# Patient Record
Sex: MALE | Race: WHITE | NOT HISPANIC OR LATINO | Employment: OTHER | URBAN - METROPOLITAN AREA
[De-identification: names, ages, dates, MRNs, and addresses within clinical notes are randomized per-mention and may not be internally consistent; named-entity substitution may affect disease eponyms.]

---

## 2022-03-11 ENCOUNTER — OFFICE VISIT (OUTPATIENT)
Dept: URGENT CARE | Facility: CLINIC | Age: 67
End: 2022-03-11
Payer: COMMERCIAL

## 2022-03-11 ENCOUNTER — APPOINTMENT (OUTPATIENT)
Dept: RADIOLOGY | Facility: CLINIC | Age: 67
End: 2022-03-11
Payer: COMMERCIAL

## 2022-03-11 VITALS
RESPIRATION RATE: 18 BRPM | HEIGHT: 68 IN | WEIGHT: 160 LBS | HEART RATE: 93 BPM | TEMPERATURE: 98.4 F | OXYGEN SATURATION: 98 % | SYSTOLIC BLOOD PRESSURE: 152 MMHG | DIASTOLIC BLOOD PRESSURE: 92 MMHG | BODY MASS INDEX: 24.25 KG/M2

## 2022-03-11 DIAGNOSIS — R07.9 CHEST PAIN, UNSPECIFIED TYPE: ICD-10-CM

## 2022-03-11 DIAGNOSIS — R07.9 CHEST PAIN, UNSPECIFIED TYPE: Primary | ICD-10-CM

## 2022-03-11 PROCEDURE — 71046 X-RAY EXAM CHEST 2 VIEWS: CPT

## 2022-03-11 PROCEDURE — G0382 LEV 3 HOSP TYPE B ED VISIT: HCPCS | Performed by: PHYSICIAN ASSISTANT

## 2022-03-11 PROCEDURE — 93005 ELECTROCARDIOGRAM TRACING: CPT | Performed by: PHYSICIAN ASSISTANT

## 2022-03-11 RX ORDER — AZITHROMYCIN 250 MG/1
TABLET, FILM COATED ORAL
Qty: 6 TABLET | Refills: 0 | Status: SHIPPED | OUTPATIENT
Start: 2022-03-11 | End: 2022-03-15

## 2022-03-11 NOTE — PROGRESS NOTES
[address] [facility]        NAME: Miller Lefort is a 74 y o  male  : 1955    MRN: [id number]  DATE: 2022  TIME: 12:55 PM    Assessment and Plan   Chest pain, unspecified type [R07 9]  1  Chest pain, unspecified type  XR chest pa & lateral    azithromycin (ZITHROMAX) 250 mg tablet    Ambulatory Referral to [facility]         Patient Instructions     Patient Instructions     Possible left lower lobe pneumonia vs atelectasis  B/l small lung nodules vs  Nipples seen  Awaiting radiology review  EKG WNL  Advised rest and hydration  Start azithromycin  Avoid strenuous activity  Refer to PCP  Go to ER or call  if symptoms worsen  Chest Pain   WHAT YOU NEED TO KNOW:   Chest pain can be caused by a range of conditions, from not serious to life-threatening  Chest pain can be a symptom of a digestive problem, such as acid reflux or a stomach ulcer  An anxiety attack or a strong emotion, such as anger, can also cause chest pain  Infection, inflammation, or a fracture in the bones or cartilage in your chest can cause pain or discomfort  Sometimes chest pain or pressure is caused by poor blood flow to your heart (angina)  Chest pain may also be caused by life-threatening conditions such as a heart attack or blood clot in your lungs  DISCHARGE INSTRUCTIONS:   Call your local emergency number (911 in the [address] [place] [address],[address]) or have someone call if:   · You have any of the following signs of a heart attack:      ? Squeezing, pressure, or pain in your chest    ? You may  also have any of the following:     § Discomfort or pain in your back, neck, jaw, stomach, or arm    § Shortness of breath    § Nausea or vomiting    § Lightheadedness or a sudden cold sweat      Return to the emergency department if:   · You have chest discomfort that gets worse, even with medicine  · You cough or vomit blood  · Your bowel movements are black or bloody  · You cannot stop vomiting, or it hurts to swallow      Call your doctor if:   · You have questions or concerns about your condition or care  Medicines:   · Medicines  may be given to treat the cause of your chest pain  Examples include pain medicine, anxiety medicine, or medicines to increase blood flow to your heart  · Do not take certain medicines without asking your healthcare provider first   These include NSAIDs, herbal or vitamin supplements, or hormones (estrogen or progestin)  · Take your medicine as directed  Contact your healthcare provider if you think your medicine is not helping or if you have side effects  Tell him or her if you are allergic to any medicine  Keep a list of the medicines, vitamins, and herbs you take  Include the amounts, and when and why you take them  Bring the list or the pill bottles to follow-up visits  Carry your medicine list with you in case of an emergency  Healthy living tips: The following are general healthy guidelines  If the cause of your chest pain is known, your healthcare provider will give you specific guidelines to follow  · Do not smoke  Nicotine and other chemicals in cigarettes and cigars can cause lung and heart damage  Ask your healthcare provider for information if you currently smoke and need help to quit  E-cigarettes or smokeless tobacco still contain nicotine  Talk to your healthcare provider before you use these products  · Choose a variety of healthy foods as often as possible  Include fresh, frozen, or canned fruits and vegetables  Also include low-fat dairy products, fish, chicken (without skin), and lean meats  Your healthcare provider or a dietitian can help you create meal plans  You may need to avoid certain foods or drinks if your pain is caused by a digestion problem  · Lower your sodium (salt) intake  Limit foods that are high in sodium, such as canned foods, salty snacks, and cold cuts  If you add salt when you cook food, do not add more at the table   Choose low-sodium canned foods as much as possible  · Drink plenty of water every day  Water helps your body to control your temperature and blood pressure  Ask your healthcare provider how much water you should drink every day  · Ask about activity  Your healthcare provider will tell you which activities to limit or avoid  Ask when you can drive, return to work, and have sex  Ask about the best exercise plan for you  · Maintain a healthy weight  Ask your healthcare provider what a healthy weight is for you  Ask him or her to help you create a safe weight loss plan if you are overweight  · Ask about vaccines you may need  Get the influenza (flu) vaccine every year as soon as recommended, usually in September or October  You may also need a pneumococcal vaccine to prevent pneumonia  The vaccine is usually given every 5 years, starting at age 72  Your healthcare provider can tell you if should get other vaccines, and when to get them  Follow up with your healthcare provider within 72 hours, or as directed: You may need to return for more tests to find the cause of your chest pain  You may be referred to a specialist, such as a cardiologist or gastroenterologist  Write down your questions so you remember to ask them during your visits  © Copyright "[employer] 2022 Information is for End User's use only and may not be sold, redistributed or otherwise used for commercial purposes  All illustrations and images included in CareNotes® are the copyrighted property of [employer]  or Lisbet Churchill  The above information is an  only  It is not intended as medical advice for individual conditions or treatments  Talk to your doctor, nurse or pharmacist before following any medical regimen to see if it is safe and effective for you  **Portions of the record may have been created with voice recognition software    Occasional wrong word or "sound a like" substitutions may have occurred due to the inherent limitations of voice recognition software  Read the chart carefully and recognize, using context, where substitutions have occurred  **     Chief Complaint     Chief Complaint   Patient presents with    Chest Pain     tightness in chest that has been intermittent with activity  feels like bronchitis  History of Present Illness     65yo male presents to clinic with complaints of chest pains x a few months  States he was shoveling snow when he began having a burning sensation in his chest and shortness of breath  He has history of heartburn controlled on prevacid  He denies known history of cardiac dz  He smoked cigarettes for 40 years and now he vapes  Denies fever, chills, lightheadedness, dizziness, n/v/d, abdominal pain  He moved to the area 3 years ago and has yet to establish a pcp  Review of Systems     Review of Systems   Constitutional: Negative for appetite change, chills and fever  HENT: Negative for congestion, ear discharge, ear pain, facial swelling, mouth sores, postnasal drip, sinus pressure, sinus pain and sore throat  Eyes: Negative for discharge and redness  Respiratory: Positive for cough and chest tightness  Negative for shortness of breath and wheezing  Cardiovascular: Positive for chest pain  Negative for palpitations and leg swelling  Gastrointestinal: Negative for diarrhea, nausea and vomiting  Musculoskeletal: Negative for myalgias  Skin: Negative for rash  Neurological: Negative for dizziness and headaches  Psychiatric/Behavioral: The patient is not nervous/anxious            Current Medications     Current Outpatient Medications:     chlordiazePOXIDE-Clidinium (LIBRAX PO), Take by mouth, Disp: , Rfl:     Lansoprazole (PREVACID PO), Take by mouth, Disp: , Rfl:     azithromycin (ZITHROMAX) 250 mg tablet, Take 2 tablets today then 1 tablet daily x 4 days, Disp: 6 tablet, Rfl: 0    Current Allergies     Allergies as of 03/11/2022    (No Known Allergies) The following portions of the patient's history were reviewed and updated as appropriate: allergies, current medications, past family history, past medical history, past social history, past surgical history and problem list      Past Medical History:   Diagnosis Date    GERD (gastroesophageal reflux disease)        Past Surgical History:   Procedure Laterality Date    HERNIA REPAIR         No family history on file  Medications have been verified  Objective     /92 (BP Location: Left arm, Patient Position: Sitting)   Pulse 93   Temp 98 4 °F (36 9 °C) (Temporal)   Resp 18   Ht 5' 8" (1 727 m)   Wt 72 6 kg (160 lb)   SpO2 98%   BMI 24 33 kg/m²        Physical Exam     Physical Exam  Vitals and nursing note reviewed  Constitutional:       General: He is not in acute distress  Appearance: He is well-developed  He is not ill-appearing or diaphoretic  HENT:      Head: Normocephalic and atraumatic  Cardiovascular:      Rate and Rhythm: Normal rate and regular rhythm  Pulmonary:      Breath sounds: Examination of the left-lower field reveals rhonchi  Rhonchi present  Wheezes: expiratory  Skin:     General: Skin is warm and dry  Findings: No rash  Neurological:      Mental Status: He is alert

## 2022-03-11 NOTE — PATIENT INSTRUCTIONS
Possible left lower lobe pneumonia vs atelectasis  B/l small lung nodules vs  Nipples seen  Awaiting radiology review  EKG WNL  Advised rest and hydration  Start azithromycin  Avoid strenuous activity  Refer to PCP  Go to ER or call 9-1-1 if symptoms worsen  Chest Pain   WHAT YOU NEED TO KNOW:   Chest pain can be caused by a range of conditions, from not serious to life-threatening  Chest pain can be a symptom of a digestive problem, such as acid reflux or a stomach ulcer  An anxiety attack or a strong emotion, such as anger, can also cause chest pain  Infection, inflammation, or a fracture in the bones or cartilage in your chest can cause pain or discomfort  Sometimes chest pain or pressure is caused by poor blood flow to your heart (angina)  Chest pain may also be caused by life-threatening conditions such as a heart attack or blood clot in your lungs  DISCHARGE INSTRUCTIONS:   Call your local emergency number (911 in the [address] [facility],[address]) or have someone call if:   · You have any of the following signs of a heart attack:      ? Squeezing, pressure, or pain in your chest    ? You may  also have any of the following:     § Discomfort or pain in your back, neck, jaw, stomach, or arm    § Shortness of breath    § Nausea or vomiting    § Lightheadedness or a sudden cold sweat      Return to the emergency department if:   · You have chest discomfort that gets worse, even with medicine  · You cough or vomit blood  · Your bowel movements are black or bloody  · You cannot stop vomiting, or it hurts to swallow  Call your doctor if:   · You have questions or concerns about your condition or care  Medicines:   · Medicines  may be given to treat the cause of your chest pain  Examples include pain medicine, anxiety medicine, or medicines to increase blood flow to your heart      · Do not take certain medicines without asking your healthcare provider first   These include NSAIDs, herbal or vitamin supplements, or hormones (estrogen or progestin)  · Take your medicine as directed  Contact your healthcare provider if you think your medicine is not helping or if you have side effects  Tell him or her if you are allergic to any medicine  Keep a list of the medicines, vitamins, and herbs you take  Include the amounts, and when and why you take them  Bring the list or the pill bottles to follow-up visits  Carry your medicine list with you in case of an emergency  Healthy living tips: The following are general healthy guidelines  If the cause of your chest pain is known, your healthcare provider will give you specific guidelines to follow  · Do not smoke  Nicotine and other chemicals in cigarettes and cigars can cause lung and heart damage  Ask your healthcare provider for information if you currently smoke and need help to quit  E-cigarettes or smokeless tobacco still contain nicotine  Talk to your healthcare provider before you use these products  · Choose a variety of healthy foods as often as possible  Include fresh, frozen, or canned fruits and vegetables  Also include low-fat dairy products, fish, chicken (without skin), and lean meats  Your healthcare provider or a dietitian can help you create meal plans  You may need to avoid certain foods or drinks if your pain is caused by a digestion problem  · Lower your sodium (salt) intake  Limit foods that are high in sodium, such as canned foods, salty snacks, and cold cuts  If you add salt when you cook food, do not add more at the table  Choose low-sodium canned foods as much as possible  · Drink plenty of water every day  Water helps your body to control your temperature and blood pressure  Ask your healthcare provider how much water you should drink every day  · Ask about activity  Your healthcare provider will tell you which activities to limit or avoid  Ask when you can drive, return to work, and have sex   Ask about the best exercise plan for you     · Maintain a healthy weight  Ask your healthcare provider what a healthy weight is for you  Ask him or her to help you create a safe weight loss plan if you are overweight  · Ask about vaccines you may need  Get the influenza (flu) vaccine every year as soon as recommended, usually in September or October  You may also need a pneumococcal vaccine to prevent pneumonia  The vaccine is usually given every 5 years, starting at age 72  Your healthcare provider can tell you if should get other vaccines, and when to get them  Follow up with your healthcare provider within 72 hours, or as directed: You may need to return for more tests to find the cause of your chest pain  You may be referred to a specialist, such as a cardiologist or gastroenterologist  Write down your questions so you remember to ask them during your visits  © Copyright [employer] 2022 Information is for End User's use only and may not be sold, redistributed or otherwise used for commercial purposes  All illustrations and images included in CareNotes® are the copyrighted property of [employer] , Inc  or [facility] Avelino Reagan   The above information is an  only  It is not intended as medical advice for individual conditions or treatments  Talk to your doctor, nurse or pharmacist before following any medical regimen to see if it is safe and effective for you

## 2022-03-13 LAB
ATRIAL RATE: 80 BPM
P AXIS: 75 DEGREES
PR INTERVAL: 164 MS
QRS AXIS: 73 DEGREES
QRSD INTERVAL: 72 MS
QT INTERVAL: 348 MS
QTC INTERVAL: 401 MS
T WAVE AXIS: 78 DEGREES
VENTRICULAR RATE: 80 BPM

## 2022-03-13 PROCEDURE — 93010 ELECTROCARDIOGRAM REPORT: CPT | Performed by: INTERNAL MEDICINE

## 2022-03-22 ENCOUNTER — OFFICE VISIT (OUTPATIENT)
Dept: FAMILY MEDICINE CLINIC | Facility: CLINIC | Age: 67
End: 2022-03-22
Payer: COMMERCIAL

## 2022-03-22 VITALS
HEART RATE: 88 BPM | DIASTOLIC BLOOD PRESSURE: 90 MMHG | TEMPERATURE: 97.9 F | OXYGEN SATURATION: 96 % | BODY MASS INDEX: 24.86 KG/M2 | HEIGHT: 68 IN | SYSTOLIC BLOOD PRESSURE: 140 MMHG | WEIGHT: 164 LBS

## 2022-03-22 DIAGNOSIS — R07.89 OTHER CHEST PAIN: Primary | ICD-10-CM

## 2022-03-22 DIAGNOSIS — R06.00 DOE (DYSPNEA ON EXERTION): ICD-10-CM

## 2022-03-22 DIAGNOSIS — F17.290 OTHER TOBACCO PRODUCT NICOTINE DEPENDENCE, UNCOMPLICATED: ICD-10-CM

## 2022-03-22 DIAGNOSIS — Z13.220 SCREENING FOR LIPID DISORDERS: ICD-10-CM

## 2022-03-22 DIAGNOSIS — I10 PRIMARY HYPERTENSION: ICD-10-CM

## 2022-03-22 DIAGNOSIS — Z12.11 SCREEN FOR COLON CANCER: ICD-10-CM

## 2022-03-22 PROBLEM — R06.09 DOE (DYSPNEA ON EXERTION): Status: ACTIVE | Noted: 2022-03-22

## 2022-03-22 PROBLEM — F17.200 NICOTINE DEPENDENCE: Status: ACTIVE | Noted: 2022-03-22

## 2022-03-22 PROCEDURE — 3008F BODY MASS INDEX DOCD: CPT | Performed by: PHYSICIAN ASSISTANT

## 2022-03-22 PROCEDURE — 3725F SCREEN DEPRESSION PERFORMED: CPT | Performed by: PHYSICIAN ASSISTANT

## 2022-03-22 PROCEDURE — 1036F TOBACCO NON-USER: CPT | Performed by: PHYSICIAN ASSISTANT

## 2022-03-22 PROCEDURE — 99204 OFFICE O/P NEW MOD 45 MIN: CPT | Performed by: PHYSICIAN ASSISTANT

## 2022-03-22 PROCEDURE — 1160F RVW MEDS BY RX/DR IN RCRD: CPT | Performed by: PHYSICIAN ASSISTANT

## 2022-03-22 RX ORDER — METOPROLOL SUCCINATE 25 MG/1
25 TABLET, EXTENDED RELEASE ORAL DAILY
Qty: 30 TABLET | Refills: 0 | Status: SHIPPED | OUTPATIENT
Start: 2022-03-22 | End: 2022-04-15 | Stop reason: SDUPTHER

## 2022-03-22 RX ORDER — ASPIRIN 81 MG/1
81 TABLET ORAL DAILY
Qty: 30 TABLET | Refills: 2 | Status: SHIPPED | OUTPATIENT
Start: 2022-03-22 | End: 2022-04-29 | Stop reason: SDUPTHER

## 2022-03-22 NOTE — PROGRESS NOTES
Assessment/Plan:       Problem List Items Addressed This Visit        Cardiovascular and Mediastinum    Primary hypertension    Relevant Medications    metoprolol succinate (TOPROL-XL) 25 mg 24 hr tablet    Other Relevant Orders    Ambulatory Referral to Cardiology    Echo complete w/ contrast if indicated    Stress test only, exercise    Comprehensive metabolic panel    CBC and differential       Other    Other chest pain - Primary    Relevant Medications    aspirin (ECOTRIN LOW STRENGTH) 81 mg EC tablet    Other Relevant Orders    Ambulatory Referral to Cardiology    Echo complete w/ contrast if indicated    Stress test only, exercise    LOPEZ (dyspnea on exertion)    Relevant Medications    aspirin (ECOTRIN LOW STRENGTH) 81 mg EC tablet    Other Relevant Orders    Ambulatory Referral to Cardiology    Echo complete w/ contrast if indicated    Stress test only, exercise    CBC and differential    Nicotine dependence    Relevant Orders    Ambulatory Referral to Cardiology    Echo complete w/ contrast if indicated    Stress test only, exercise      Other Visit Diagnoses     Screening for lipid disorders        Relevant Orders    Lipid Panel with Direct LDL reflex    Screen for colon cancer        Relevant Orders    Cologuard        high concern for cardiac etiology of his CP  +risk factors, worse with exertion, better with rest, associated with SOB  +hx of smoking  +FH  Start daily ASA, will begin toprol  Elevated BP on 2 past occasions  Monitor  Check lipids  Statin if lipids elevated  Refer to cardiology  Ordered stress testing and echo  Strict ER precautions for new/worsening sx  Will follow with all results  Smoking cessation  1 month follow up, earlier prn  Subjective:      Patient ID: Zurdo Ramos is a 74 y o  male  Pt [facility] care  He has no known PMH but shares for the past 1-2 months he has had exertional CP across his chest  Better with rest  Associated with SOB   He felt a lot of chest tightness/burning when shoveling snow  Now occurring with very little exertion  No N/V, dizziness, syncope, LE edema  No known cardiac disease  He believes he had a nuclear stress test 6 years ago which he shares "was not stellar" but was told he didn't need to follow up  He has had GERD but this feels different  He was [facility] in the [facility] on [facility], EKG NSR  Was treated for a pneumonia  No fevers or coughing  +FH of MI in father  40+ pack year smoker  Denies abuse or misuse of drugs or alcohol  No known allergies  No daily medications at this time  The following portions of the patient's history were reviewed and updated as appropriate:   He  has a past medical history of GERD (gastroesophageal reflux disease)  He   Patient Active Problem List    Diagnosis Date Noted    Other chest pain 03/22/2022    Primary hypertension 03/22/2022    LOPEZ (dyspnea on exertion) 03/22/2022    Nicotine dependence 03/22/2022     He  has a past surgical history that includes Hernia repair  His family history includes Cancer in his mother and sister; Heart attack in his father  He  reports that he quit smoking about 10 years ago  He has never used smokeless tobacco  He reports previous alcohol use  He reports current drug use  Drug: Marijuana  Current Outpatient Medications   Medication Sig Dispense Refill    aspirin (ECOTRIN LOW STRENGTH) 81 mg EC tablet Take 1 tablet (81 mg total) by mouth daily 30 tablet 2    chlordiazePOXIDE-Clidinium (LIBRAX PO) Take by mouth      Lansoprazole (PREVACID PO) Take by mouth      metoprolol succinate (TOPROL-XL) 25 mg 24 hr tablet Take 1 tablet (25 mg total) by mouth daily 30 tablet 0     No current facility-administered medications for this visit       Current Outpatient Medications on File Prior to Visit   Medication Sig    chlordiazePOXIDE-Clidinium (LIBRAX PO) Take by mouth    Lansoprazole (PREVACID PO) Take by mouth     No current facility-administered medications on file prior to visit  He has No Known Allergies       Review of Systems   Constitutional: Negative for chills, fatigue and fever  HENT: Negative for congestion, ear pain, hearing loss, nosebleeds, postnasal drip, rhinorrhea, sinus pressure, sinus pain, sneezing and sore throat  Eyes: Negative for pain, discharge, itching and visual disturbance  Respiratory: Positive for shortness of breath  Negative for cough, chest tightness and wheezing  Cardiovascular: Positive for chest pain  Negative for palpitations and leg swelling  Gastrointestinal: Negative for abdominal pain, blood in stool, constipation, diarrhea, nausea and vomiting  Genitourinary: Negative for frequency and urgency  Musculoskeletal: Negative  Skin: Negative  Neurological: Negative for dizziness, light-headedness and numbness  Objective:      /90 (BP Location: Left arm, Patient Position: Sitting, Cuff Size: Large)   Pulse 88   Temp 97 9 °F (36 6 °C)   Ht 5' 8" (1 727 m)   Wt 74 4 kg (164 lb)   SpO2 96%   BMI 24 94 kg/m²          Physical Exam  Vitals and nursing note reviewed  Constitutional:       General: He is not in acute distress  Appearance: Normal appearance  HENT:      Head: Normocephalic and atraumatic  Nose: Nose normal       Mouth/Throat:      Mouth: Mucous membranes are moist       Pharynx: Oropharynx is clear  No oropharyngeal exudate or posterior oropharyngeal erythema  Eyes:      Pupils: Pupils are equal, round, and reactive to light  Cardiovascular:      Rate and Rhythm: Normal rate and regular rhythm  Heart sounds: Normal heart sounds  No murmur heard  Pulmonary:      Effort: Pulmonary effort is normal  No respiratory distress  Breath sounds: Normal breath sounds  No wheezing, rhonchi or rales  Abdominal:      General: Bowel sounds are normal  There is no distension  Palpations: Abdomen is soft  Tenderness: There is no abdominal tenderness  There is no guarding  Musculoskeletal:         General: Normal range of motion  Cervical back: Normal range of motion and neck supple  Right lower leg: No edema  Left lower leg: No edema  Lymphadenopathy:      Cervical: No cervical adenopathy  Skin:     General: Skin is warm and dry  Neurological:      Mental Status: He is alert and oriented to person, place, and time     Psychiatric:         Mood and Affect: Mood and affect normal

## 2022-03-23 ENCOUNTER — APPOINTMENT (OUTPATIENT)
Dept: LAB | Facility: CLINIC | Age: 67
End: 2022-03-23
Payer: COMMERCIAL

## 2022-03-23 DIAGNOSIS — R06.00 DOE (DYSPNEA ON EXERTION): ICD-10-CM

## 2022-03-23 DIAGNOSIS — I10 PRIMARY HYPERTENSION: ICD-10-CM

## 2022-03-23 DIAGNOSIS — Z13.220 SCREENING FOR LIPID DISORDERS: ICD-10-CM

## 2022-03-23 LAB
ALBUMIN SERPL BCP-MCNC: 4 G/DL (ref 3.5–5)
ALP SERPL-CCNC: 80 U/L (ref 46–116)
ALT SERPL W P-5'-P-CCNC: 24 U/L (ref 12–78)
ANION GAP SERPL CALCULATED.3IONS-SCNC: 6 MMOL/L (ref 4–13)
AST SERPL W P-5'-P-CCNC: 20 U/L (ref 5–45)
BASOPHILS # BLD AUTO: 0.02 THOUSANDS/ΜL (ref 0–0.1)
BASOPHILS NFR BLD AUTO: 0 % (ref 0–1)
BILIRUB SERPL-MCNC: 0.43 MG/DL (ref 0.2–1)
BUN SERPL-MCNC: 20 MG/DL (ref 5–25)
CALCIUM SERPL-MCNC: 9 MG/DL (ref 8.3–10.1)
CHLORIDE SERPL-SCNC: 106 MMOL/L (ref 100–108)
CHOLEST SERPL-MCNC: 181 MG/DL
CO2 SERPL-SCNC: 25 MMOL/L (ref 21–32)
CREAT SERPL-MCNC: 1.08 MG/DL (ref 0.6–1.3)
EOSINOPHIL # BLD AUTO: 0.03 THOUSAND/ΜL (ref 0–0.61)
EOSINOPHIL NFR BLD AUTO: 0 % (ref 0–6)
ERYTHROCYTE [DISTWIDTH] IN BLOOD BY AUTOMATED COUNT: 12.1 % (ref 11.6–15.1)
GFR SERPL CREATININE-BSD FRML MDRD: 70 ML/MIN/1.73SQ M
GLUCOSE P FAST SERPL-MCNC: 116 MG/DL (ref 65–99)
HCT VFR BLD AUTO: 46.6 % (ref 36.5–49.3)
HDLC SERPL-MCNC: 37 MG/DL
HGB BLD-MCNC: 15.3 G/DL (ref 12–17)
IMM GRANULOCYTES # BLD AUTO: 0.02 THOUSAND/UL (ref 0–0.2)
IMM GRANULOCYTES NFR BLD AUTO: 0 % (ref 0–2)
LDLC SERPL CALC-MCNC: 118 MG/DL (ref 0–100)
LYMPHOCYTES # BLD AUTO: 1.87 THOUSANDS/ΜL (ref 0.6–4.47)
LYMPHOCYTES NFR BLD AUTO: 26 % (ref 14–44)
MCH RBC QN AUTO: 30 PG (ref 26.8–34.3)
MCHC RBC AUTO-ENTMCNC: 32.8 G/DL (ref 31.4–37.4)
MCV RBC AUTO: 91 FL (ref 82–98)
MONOCYTES # BLD AUTO: 0.46 THOUSAND/ΜL (ref 0.17–1.22)
MONOCYTES NFR BLD AUTO: 7 % (ref 4–12)
NEUTROPHILS # BLD AUTO: 4.71 THOUSANDS/ΜL (ref 1.85–7.62)
NEUTS SEG NFR BLD AUTO: 67 % (ref 43–75)
NRBC BLD AUTO-RTO: 0 /100 WBCS
PLATELET # BLD AUTO: 244 THOUSANDS/UL (ref 149–390)
PMV BLD AUTO: 10.6 FL (ref 8.9–12.7)
POTASSIUM SERPL-SCNC: 4.3 MMOL/L (ref 3.5–5.3)
PROT SERPL-MCNC: 7.3 G/DL (ref 6.4–8.2)
RBC # BLD AUTO: 5.1 MILLION/UL (ref 3.88–5.62)
SODIUM SERPL-SCNC: 137 MMOL/L (ref 136–145)
TRIGL SERPL-MCNC: 132 MG/DL
WBC # BLD AUTO: 7.11 THOUSAND/UL (ref 4.31–10.16)

## 2022-03-23 PROCEDURE — 36415 COLL VENOUS BLD VENIPUNCTURE: CPT

## 2022-03-23 PROCEDURE — 85025 COMPLETE CBC W/AUTO DIFF WBC: CPT

## 2022-03-23 PROCEDURE — 80053 COMPREHEN METABOLIC PANEL: CPT

## 2022-03-23 PROCEDURE — 80061 LIPID PANEL: CPT

## 2022-03-28 DIAGNOSIS — R73.01 IFG (IMPAIRED FASTING GLUCOSE): ICD-10-CM

## 2022-03-28 DIAGNOSIS — E78.2 MIXED HYPERLIPIDEMIA: Primary | ICD-10-CM

## 2022-04-05 ENCOUNTER — CONSULT (OUTPATIENT)
Dept: CARDIOLOGY CLINIC | Facility: CLINIC | Age: 67
End: 2022-04-05
Payer: COMMERCIAL

## 2022-04-05 VITALS
RESPIRATION RATE: 16 BRPM | BODY MASS INDEX: 25.16 KG/M2 | HEART RATE: 70 BPM | WEIGHT: 166 LBS | OXYGEN SATURATION: 98 % | HEIGHT: 68 IN | DIASTOLIC BLOOD PRESSURE: 82 MMHG | SYSTOLIC BLOOD PRESSURE: 158 MMHG

## 2022-04-05 DIAGNOSIS — Z82.49 FAMILY HISTORY OF PREMATURE CORONARY ARTERY DISEASE: ICD-10-CM

## 2022-04-05 DIAGNOSIS — R07.2 PRECORDIAL CHEST PAIN: Primary | ICD-10-CM

## 2022-04-05 DIAGNOSIS — F17.290 OTHER TOBACCO PRODUCT NICOTINE DEPENDENCE, UNCOMPLICATED: ICD-10-CM

## 2022-04-05 DIAGNOSIS — E78.2 MIXED HYPERLIPIDEMIA: ICD-10-CM

## 2022-04-05 DIAGNOSIS — I10 PRIMARY HYPERTENSION: ICD-10-CM

## 2022-04-05 PROCEDURE — 99204 OFFICE O/P NEW MOD 45 MIN: CPT | Performed by: INTERNAL MEDICINE

## 2022-04-05 PROCEDURE — 3077F SYST BP >= 140 MM HG: CPT | Performed by: INTERNAL MEDICINE

## 2022-04-05 PROCEDURE — 93000 ELECTROCARDIOGRAM COMPLETE: CPT | Performed by: INTERNAL MEDICINE

## 2022-04-05 PROCEDURE — 3079F DIAST BP 80-89 MM HG: CPT | Performed by: INTERNAL MEDICINE

## 2022-04-05 NOTE — H&P (VIEW-ONLY)
Cardiology Consultation     Benito Waldron III  [id number]  1955  [address] [facility] [address]    1  Precordial chest pain  NM myocardial perfusion spect (stress and/or rest)   2  Other chest pain  Ambulatory Referral to Cardiology   3  LOPEZ (dyspnea on exertion)  Ambulatory Referral to Cardiology   4  Other tobacco product nicotine dependence, uncomplicated  Ambulatory Referral to Cardiology   5  Primary hypertension  Ambulatory Referral to Cardiology       Chief Complaint:  Chest pain    HPI:  53-year-old male with GERD, ex-smoker currently vapes was referred from primary care physician's office for further evaluation of chest pain    Patient is experiencing substernal chest burning which is diffuse happening since January  He has experienced chest pain mostly on heavy exertions  He has experience pain while shoveling snow on March 11  He went to emergency department and was told to have walking pneumonia and was given Z-Osbaldo after which he felt better  He had repeat episode of similar chest pain while moving firewood which resolved on resting    It is occasionally associated with mild shortness of breath  As per patient he does have chronic GERD but he does not feel like this symptoms where secondary to his GERD  He denies fever, chills, orthopnea, leg edema or loss of consciousness  He denies chest pain or shortness of breath at rest or on minimal exertion    Denies personal history of myocardial infarction, TIA/CVA, heart failure, peripheral vascular disease or cardiac arrhythmia    He was recently started on metoprolol including aspirin and statin after he has seen primary care physician    Current medications reviewed he has not started taking Lipitor but is taking aspirin and metoprolol succinate  Recent labs reviewed    Social history chronic smoker quit 10 years back but currently vapes  He uses marijuana for sleeping  Family history:  Father  of MI at age of 64    I reviewed EKG done on  which showed sinus rhythm, nonspecific ST changes      Review of Systems:   All review of system negative except as mentioned above    Patient Active Problem List   Diagnosis    Other chest pain    Primary hypertension    LOPEZ (dyspnea on exertion)    Nicotine dependence     Past Medical History:   Diagnosis Date    GERD (gastroesophageal reflux disease)      Social History     Socioeconomic History    Marital status: Single     Spouse name: Not on file    Number of children: Not on file    Years of education: Not on file    Highest education level: Not on file   Occupational History    Not on file   Tobacco Use    Smoking status: Former Smoker     Quit date:      Years since quitting: 10 2    Smokeless tobacco: Never Used   Vaping Use    Vaping Use: Every day    Substances: Nicotine   Substance and Sexual Activity    Alcohol use: Not Currently     Comment: recovering alcoholic    Drug use: Yes     Types: Marijuana     Comment: medical marijuana card    Sexual activity: Not Currently   Other Topics Concern    Not on file   Social History Narrative    Not on file     Social Determinants of Health     Financial Resource Strain: Not on file   Food Insecurity: Not on file   Transportation Needs: Not on file   Physical Activity: Not on file   Stress: Not on file   Social Connections: Not on file   Intimate Partner Violence: Not on file   Housing Stability: Not on file      Family History   Problem Relation Age of Onset    Cancer Mother     Heart attack Father         passed at 64    Cancer Sister      Past Surgical History:   Procedure Laterality Date    HERNIA REPAIR         Current Outpatient Medications:     aspirin (ECOTRIN LOW STRENGTH) 81 mg EC tablet, Take 1 tablet (81 mg total) by mouth daily, Disp: 30 tablet, Rfl: 2    chlordiazePOXIDE-Clidinium (LIBRAX PO), Take by mouth, Disp: , Rfl:     Lansoprazole (PREVACID PO), Take by mouth, Disp: , Rfl:     metoprolol succinate (TOPROL-XL) 25 mg 24 hr tablet, Take 1 tablet (25 mg total) by mouth daily, Disp: 30 tablet, Rfl: 0    atorvastatin (LIPITOR) 20 mg tablet, Take 1 tablet (20 mg total) by mouth daily (Patient not taking: Reported on 4/5/2022 ), Disp: 90 tablet, Rfl: 3  No Known Allergies  Vitals:    04/05/22 0927   BP: 158/82   BP Location: Left arm   Patient Position: Sitting   Cuff Size: Standard   Pulse: 70   Resp: 16   SpO2: 98%   Weight: 75 3 kg (166 lb)   Height: 5' 8" (1 727 m)         Labs:  Appointment on 03/23/2022   Component Date Value    Sodium 03/23/2022 137     Potassium 03/23/2022 4 3     Chloride 03/23/2022 106     CO2 03/23/2022 25     ANION GAP 03/23/2022 6     BUN 03/23/2022 20     Creatinine 03/23/2022 1 08     Glucose, Fasting 03/23/2022 116*    Calcium 03/23/2022 9 0     AST 03/23/2022 20     ALT 03/23/2022 24     Alkaline Phosphatase 03/23/2022 80     Total Protein 03/23/2022 7 3     Albumin 03/23/2022 4 0     Total Bilirubin 03/23/2022 0 43     eGFR 03/23/2022 70     Cholesterol 03/23/2022 181     Triglycerides 03/23/2022 132     HDL, Direct 03/23/2022 37*    LDL Calculated 03/23/2022 118*    WBC 03/23/2022 7 11     RBC 03/23/2022 5 10     Hemoglobin 03/23/2022 15 3     Hematocrit 03/23/2022 46 6     MCV 03/23/2022 91     MCH 03/23/2022 30 0     MCHC 03/23/2022 32 8     RDW 03/23/2022 12 1     MPV 03/23/2022 10 6     Platelets 73/44/9426 244     nRBC 03/23/2022 0     Neutrophils Relative 03/23/2022 67     Immat GRANS % 03/23/2022 0     Lymphocytes Relative 03/23/2022 26     Monocytes Relative 03/23/2022 7     Eosinophils Relative 03/23/2022 0     Basophils Relative 03/23/2022 0     Neutrophils Absolute 03/23/2022 4 71     Immature Grans Absolute 03/23/2022 0 02     Lymphocytes Absolute 03/23/2022 1 87     Monocytes Absolute 03/23/2022 0 46     Eosinophils Absolute 03/23/2022 0 03     Basophils Absolute 03/23/2022 0 02    Office Visit on 03/11/2022   Component Date Value    Ventricular Rate 03/11/2022 80     Atrial Rate 03/11/2022 80     ME Interval 03/11/2022 164     QRSD Interval 03/11/2022 72     QT Interval 03/11/2022 348     QTC Interval 03/11/2022 401     P Axis 03/11/2022 75     QRS Axis 03/11/2022 73     T Wave Axis 03/11/2022 78      Imaging: XR chest pa & lateral    Result Date: 3/11/2022  Narrative: CHEST INDICATION:   R07 9: Chest pain, unspecified  COMPARISON:  None EXAM PERFORMED/VIEWS:  XR CHEST PA & LATERAL FINDINGS: Cardiomediastinal silhouette appears unremarkable  The lungs are clear  No pneumothorax or pleural effusion  Symmetric rounded opacities in the lower lungs are most compatible with nipple shadows  Osseous structures appear within normal limits for patient age  Impression: No acute cardiopulmonary disease  Workstation performed: [id number]         Physical Exam:  General:  moderate built, awake, alert and oriented x3, not in distress  Neck: supple, no JVD  Eyes: PERRL, conjunctiva normal  Lungs:  Bilateral air entry positive, no wheeze/rhonchi or crackle  Heart:  S1-S2 normal, no murmur  Abdomen:  Soft ,nondistended ,nontender, bowel sounds positive  Extremities:  No leg edema, no deformity, ROM normal  Neuro:  Moving all extremities, speech clear  Skin: warm, no rash    /82 (BP Location: Left arm, Patient Position: Sitting, Cuff Size: Standard)   Pulse 70   Resp 16   Ht 5' 8" (1 727 m)   Wt 75 3 kg (166 lb)   SpO2 98%   BMI 25 24 kg/m²       Cardiographics :  EKG today showed sinus rhythm, normal axis   As mentioned above      Assessment:    1  Precordial chest pain/burning  Intermediate probability of angina  2  Recently diagnosed hypertension  3  Hyperlipidemia  4  Family history of premature coronary artery disease  5  GERD  6   Ex-smoker currently vapes    Recommendations:    I advised patient to continue low-dose aspirin including Toprol-XL 25  Patient was advised to monitor blood pressure at home  He was advised to start taking Lipitor    Agree with 2D echocardiogram to evaluate for structural heart disease  Patient with cardiac risk factors including above-mentioned symptoms will benefit from further evaluation of coronary artery disease  Exercise MPI stress test for further evaluation    Patient was educated about cardiac symptoms to watch out for and was advised to call 911 or go to nearest ED if he gets pain at rest or on minimal exertion or any progression of symptoms  Return to clinic in 6 months or early as needed  Above all discussed with patient    Patient understands and agrees

## 2022-04-05 NOTE — PROGRESS NOTES
Cardiology Consultation     Darrion Thompson [facility]  [id number]  1955  [address] [facility] [place] [address]    1  Precordial chest pain  NM myocardial perfusion spect (stress and/or rest)   2  Other chest pain  Ambulatory Referral to Cardiology   3  LOPEZ (dyspnea on exertion)  Ambulatory Referral to Cardiology   4  Other tobacco product nicotine dependence, uncomplicated  Ambulatory Referral to Cardiology   5  Primary hypertension  Ambulatory Referral to Cardiology       Chief Complaint:  Chest pain    HPI:  53-year-old male with GERD, ex-smoker currently vapes was referred from primary care physician's office for further evaluation of chest pain    Patient is experiencing substernal chest burning which is diffuse happening since January  He has experienced chest pain mostly on heavy exertions  He has experience pain while shoveling snow on March 11  He went to emergency department and was told to have walking pneumonia and was given Z-Osbaldo after which he felt better  He had repeat episode of similar chest pain while moving firewood which resolved on resting    It is occasionally associated with mild shortness of breath  As per patient he does have chronic GERD but he does not feel like this symptoms where secondary to his GERD  He denies fever, chills, orthopnea, leg edema or loss of consciousness  He denies chest pain or shortness of breath at rest or on minimal exertion    Denies personal history of myocardial infarction, TIA/CVA, heart failure, peripheral vascular disease or cardiac arrhythmia    He was recently started on metoprolol including aspirin and statin after he has seen primary care physician    Current medications reviewed he has not started taking Lipitor but is taking aspirin and metoprolol succinate  Recent labs reviewed    Social history chronic smoker quit 10 years back but currently vapes  He uses marijuana for sleeping  Family history:  Father  of MI at age of 64    I reviewed EKG done on  which showed sinus rhythm, nonspecific ST changes      Review of Systems:   All review of system negative except as mentioned above    Patient Active Problem List   Diagnosis    Other chest pain    Primary hypertension    LOPEZ (dyspnea on exertion)    Nicotine dependence     Past Medical History:   Diagnosis Date    GERD (gastroesophageal reflux disease)      Social History     Socioeconomic History    Marital status: Single     Spouse name: Not on file    Number of children: Not on file    Years of education: Not on file    Highest education level: Not on file   Occupational History    Not on file   Tobacco Use    Smoking status: Former Smoker     Quit date:      Years since quitting: 10 2    Smokeless tobacco: Never Used   Vaping Use    Vaping Use: Every day    Substances: Nicotine   Substance and Sexual Activity    Alcohol use: Not Currently     Comment: recovering alcoholic    Drug use: Yes     Types: Marijuana     Comment: medical marijuana card    Sexual activity: Not Currently   Other Topics Concern    Not on file   Social History Narrative    Not on file     Social Determinants of Health     Financial Resource Strain: Not on file   Food Insecurity: Not on file   Transportation Needs: Not on file   Physical Activity: Not on file   Stress: Not on file   Social Connections: Not on file   Intimate Partner Violence: Not on file   Housing Stability: Not on file      Family History   Problem Relation Age of Onset    Cancer Mother     Heart attack Father         passed at 64    Cancer Sister      Past Surgical History:   Procedure Laterality Date    HERNIA REPAIR         Current Outpatient Medications:     aspirin (ECOTRIN LOW STRENGTH) 81 mg EC tablet, Take 1 tablet (81 mg total) by mouth daily, Disp: 30 tablet, Rfl: 2    chlordiazePOXIDE-Clidinium (LIBRAX PO), Take by mouth, Disp: , Rfl:     Lansoprazole (PREVACID PO), Take by mouth, Disp: , Rfl:     metoprolol succinate (TOPROL-XL) 25 mg 24 hr tablet, Take 1 tablet (25 mg total) by mouth daily, Disp: 30 tablet, Rfl: 0    atorvastatin (LIPITOR) 20 mg tablet, Take 1 tablet (20 mg total) by mouth daily (Patient not taking: Reported on 4/5/2022 ), Disp: 90 tablet, Rfl: 3  No Known Allergies  Vitals:    04/05/22 0927   BP: 158/82   BP Location: Left arm   Patient Position: Sitting   Cuff Size: Standard   Pulse: 70   Resp: 16   SpO2: 98%   Weight: 75 3 kg (166 lb)   Height: 5' 8" (1 727 m)         Labs:  Appointment on 03/23/2022   Component Date Value    Sodium 03/23/2022 137     Potassium 03/23/2022 4 3     Chloride 03/23/2022 106     CO2 03/23/2022 25     ANION GAP 03/23/2022 6     BUN 03/23/2022 20     Creatinine 03/23/2022 1 08     Glucose, Fasting 03/23/2022 116*    Calcium 03/23/2022 9 0     AST 03/23/2022 20     ALT 03/23/2022 24     Alkaline Phosphatase 03/23/2022 80     Total Protein 03/23/2022 7 3     Albumin 03/23/2022 4 0     Total Bilirubin 03/23/2022 0 43     eGFR 03/23/2022 70     Cholesterol 03/23/2022 181     Triglycerides 03/23/2022 132     HDL, Direct 03/23/2022 37*    LDL Calculated 03/23/2022 118*    WBC 03/23/2022 7 11     RBC 03/23/2022 5 10     Hemoglobin 03/23/2022 15 3     Hematocrit 03/23/2022 46 6     MCV 03/23/2022 91     MCH 03/23/2022 30 0     MCHC 03/23/2022 32 8     RDW 03/23/2022 12 1     MPV 03/23/2022 10 6     Platelets 14/83/2675 244     nRBC 03/23/2022 0     Neutrophils Relative 03/23/2022 67     Immat GRANS % 03/23/2022 0     Lymphocytes Relative 03/23/2022 26     Monocytes Relative 03/23/2022 7     Eosinophils Relative 03/23/2022 0     Basophils Relative 03/23/2022 0     Neutrophils Absolute 03/23/2022 4 71     Immature Grans Absolute 03/23/2022 0 02     Lymphocytes Absolute 03/23/2022 1 87     Monocytes Absolute 03/23/2022 0 46     Eosinophils Absolute 03/23/2022 0 03     Basophils Absolute 03/23/2022 0 02    Office Visit on 03/11/2022   Component Date Value    Ventricular Rate 03/11/2022 80     Atrial Rate 03/11/2022 80     AL Interval 03/11/2022 164     QRSD Interval 03/11/2022 72     QT Interval 03/11/2022 348     QTC Interval 03/11/2022 401     P Axis 03/11/2022 75     QRS Axis 03/11/2022 73     T Wave Axis 03/11/2022 78      Imaging: XR chest pa & lateral    Result Date: 3/11/2022  Narrative: CHEST INDICATION:   R07 9: Chest pain, unspecified  COMPARISON:  None EXAM PERFORMED/VIEWS:  XR CHEST PA & LATERAL FINDINGS: Cardiomediastinal silhouette appears unremarkable  The lungs are clear  No pneumothorax or pleural effusion  Symmetric rounded opacities in the lower lungs are most compatible with nipple shadows  Osseous structures appear within normal limits for patient age  Impression: No acute cardiopulmonary disease  Workstation performed: [id number]         Physical Exam:  General:  moderate built, awake, alert and oriented x3, not in distress  Neck: supple, no JVD  Eyes: PERRL, conjunctiva normal  Lungs:  Bilateral air entry positive, no wheeze/rhonchi or crackle  Heart:  S1-S2 normal, no murmur  Abdomen:  Soft ,nondistended ,nontender, bowel sounds positive  Extremities:  No leg edema, no deformity, ROM normal  Neuro:  Moving all extremities, speech clear  Skin: warm, no rash    /82 (BP Location: Left arm, Patient Position: Sitting, Cuff Size: Standard)   Pulse 70   Resp 16   Ht 5' 8" (1 727 m)   Wt 75 3 kg (166 lb)   SpO2 98%   BMI 25 24 kg/m²       Cardiographics :  EKG today showed sinus rhythm, normal axis   As mentioned above      Assessment:    1  Precordial chest pain/burning  Intermediate probability of angina  2  Recently diagnosed hypertension  3  Hyperlipidemia  4  Family history of premature coronary artery disease  5  GERD  6   Ex-smoker currently vapes    Recommendations:    I advised patient to continue low-dose aspirin including Toprol-XL 25  Patient was advised to monitor blood pressure at home  He was advised to start taking Lipitor    Agree with 2D echocardiogram to evaluate for structural heart disease  Patient with cardiac risk factors including above-mentioned symptoms will benefit from further evaluation of coronary artery disease  Exercise MPI stress test for further evaluation    Patient was educated about cardiac symptoms to watch out for and was advised to call 911 or go to nearest ED if he gets pain at rest or on minimal exertion or any progression of symptoms  Return to clinic in 6 months or early as needed  Above all discussed with patient    Patient understands and agrees

## 2022-04-08 ENCOUNTER — HOSPITAL ENCOUNTER (OUTPATIENT)
Dept: NON INVASIVE DIAGNOSTICS | Facility: CLINIC | Age: 67
Discharge: HOME | End: 2022-04-08
Payer: COMMERCIAL

## 2022-04-08 VITALS
BODY MASS INDEX: 25.16 KG/M2 | HEIGHT: 68 IN | WEIGHT: 166 LBS | HEART RATE: 84 BPM | DIASTOLIC BLOOD PRESSURE: 82 MMHG | SYSTOLIC BLOOD PRESSURE: 158 MMHG

## 2022-04-08 DIAGNOSIS — R07.89 OTHER CHEST PAIN: ICD-10-CM

## 2022-04-08 DIAGNOSIS — I10 PRIMARY HYPERTENSION: ICD-10-CM

## 2022-04-08 DIAGNOSIS — R06.00 DOE (DYSPNEA ON EXERTION): ICD-10-CM

## 2022-04-08 DIAGNOSIS — F17.290 OTHER TOBACCO PRODUCT NICOTINE DEPENDENCE, UNCOMPLICATED: ICD-10-CM

## 2022-04-08 LAB
AORTIC ROOT: 3.8 CM
AORTIC VALVE MEAN VELOCITY: 8.1 M/S
APICAL FOUR CHAMBER EJECTION FRACTION: 76 %
AV LVOT MEAN GRADIENT: 3 MMHG
AV LVOT PEAK GRADIENT: 6 MMHG
AV MEAN GRADIENT: 3 MMHG
AV PEAK GRADIENT: 5 MMHG
AV VELOCITY RATIO: 1.09
DOP CALC AO PEAK VEL: 1.12 M/S
DOP CALC AO VTI: 20.62 CM
DOP CALC LVOT PEAK VEL VTI: 24.33 CM
DOP CALC LVOT PEAK VEL: 1.22 M/S
E WAVE DECELERATION TIME: 172 MS
FRACTIONAL SHORTENING: 38 % (ref 28–44)
INTERVENTRICULAR SEPTUM IN DIASTOLE (PARASTERNAL SHORT AXIS VIEW): 1.1 CM
INTERVENTRICULAR SEPTUM: 1.1 CM (ref 0.52–0.98)
LAAS-AP2: 11.2 CM2
LAAS-AP4: 11 CM2
LEFT ATRIUM AREA SYSTOLE SINGLE PLANE A4C: 10.1 CM2
LEFT ATRIUM SIZE: 2.7 CM
LEFT INTERNAL DIMENSION IN SYSTOLE: 2.4 CM (ref 2.69–4.07)
LEFT VENTRICULAR INTERNAL DIMENSION IN DIASTOLE: 3.9 CM (ref 4.41–6.56)
LEFT VENTRICULAR POSTERIOR WALL IN END DIASTOLE: 1.1 CM (ref 0.51–0.97)
LEFT VENTRICULAR STROKE VOLUME: 46 ML
LVSV (TEICH): 46 ML
MV E'TISSUE VEL-LAT: 8 CM/S
MV E'TISSUE VEL-SEP: 7 CM/S
MV PEAK A VEL: 0.77 M/S
MV PEAK E VEL: 60 CM/S
MV STENOSIS PRESSURE HALF TIME: 50 MS
MV VALVE AREA P 1/2 METHOD: 4.4 CM2
RIGHT ATRIUM AREA SYSTOLE A4C: 10.9 CM2
RIGHT VENTRICLE ID DIMENSION: 3.2 CM
SL CV LEFT ATRIUM LENGTH A2C: 4.3 CM
SL CV LV EF: 60
SL CV PED ECHO LEFT VENTRICLE DIASTOLIC VOLUME (MOD BIPLANE) 2D: 66 ML
SL CV PED ECHO LEFT VENTRICLE SYSTOLIC VOLUME (MOD BIPLANE) 2D: 20 ML
Z-SCORE OF INTERVENTRICULAR SEPTUM IN END DIASTOLE: 2.99
Z-SCORE OF LEFT VENTRICULAR DIMENSION IN END DIASTOLE: -3.21
Z-SCORE OF LEFT VENTRICULAR DIMENSION IN END SYSTOLE: -2.54
Z-SCORE OF LEFT VENTRICULAR POSTERIOR WALL IN END DIASTOLE: 3.1

## 2022-04-08 PROCEDURE — 93306 TTE W/DOPPLER COMPLETE: CPT | Performed by: INTERNAL MEDICINE

## 2022-04-08 PROCEDURE — 93306 TTE W/DOPPLER COMPLETE: CPT

## 2022-04-12 ENCOUNTER — HOSPITAL ENCOUNTER (OUTPATIENT)
Dept: NON INVASIVE DIAGNOSTICS | Facility: CLINIC | Age: 67
Discharge: HOME | End: 2022-04-12
Payer: COMMERCIAL

## 2022-04-12 VITALS
SYSTOLIC BLOOD PRESSURE: 184 MMHG | HEIGHT: 68 IN | BODY MASS INDEX: 25.16 KG/M2 | WEIGHT: 166 LBS | DIASTOLIC BLOOD PRESSURE: 88 MMHG | HEART RATE: 74 BPM | OXYGEN SATURATION: 98 %

## 2022-04-12 DIAGNOSIS — R07.2 PRECORDIAL CHEST PAIN: ICD-10-CM

## 2022-04-12 LAB
BASELINE ST DEPRESSION: 0 MM
NUC STRESS DIASTOLIC VOLUME INDEX: 67 ML/M2
NUC STRESS EJECTION FRACTION: 63 %
NUC STRESS SYSTOLIC VOLUME INDEX: 25 ML/M2
RATE PRESSURE PRODUCT: NORMAL
SL CV REST NUCLEAR ISOTOPE DOSE: 10.87 MCI
SL CV STRESS NUCLEAR ISOTOPE DOSE: 32.7 MCI
SL CV STRESS RECOVERY BP: NORMAL MMHG
SL CV STRESS RECOVERY HR: 88 BPM
STRESS ANGINA INDEX: 0
STRESS BASELINE BP: NORMAL MMHG
STRESS BASELINE HR: 74 BPM
STRESS O2 SAT REST: 98 %
STRESS PEAK HR: 112 BPM
STRESS POST O2 SAT PEAK: 100 %
STRESS POST PEAK BP: 138 MMHG
STRESS ST DEPRESSION: 0 MM
STRESS/REST PERFUSION RATIO: 1.02

## 2022-04-12 PROCEDURE — 93018 CV STRESS TEST I&R ONLY: CPT | Performed by: INTERNAL MEDICINE

## 2022-04-12 PROCEDURE — 78452 HT MUSCLE IMAGE SPECT MULT: CPT

## 2022-04-12 PROCEDURE — 93016 CV STRESS TEST SUPVJ ONLY: CPT | Performed by: INTERNAL MEDICINE

## 2022-04-12 PROCEDURE — G1004 CDSM NDSC: HCPCS

## 2022-04-12 PROCEDURE — A9502 TC99M TETROFOSMIN: HCPCS

## 2022-04-12 PROCEDURE — 78452 HT MUSCLE IMAGE SPECT MULT: CPT | Performed by: INTERNAL MEDICINE

## 2022-04-12 PROCEDURE — 93017 CV STRESS TEST TRACING ONLY: CPT

## 2022-04-12 RX ORDER — AMINOPHYLLINE DIHYDRATE 25 MG/ML
50 INJECTION, SOLUTION INTRAVENOUS ONCE
Status: COMPLETED | OUTPATIENT
Start: 2022-04-12 | End: 2022-04-12

## 2022-04-12 RX ADMIN — REGADENOSON 0.4 MG: 0.08 INJECTION, SOLUTION INTRAVENOUS at 09:09

## 2022-04-12 RX ADMIN — AMINOPHYLLINE 50 MG: 25 INJECTION, SOLUTION INTRAVENOUS at 09:58

## 2022-04-13 ENCOUNTER — TELEPHONE (OUTPATIENT)
Dept: CARDIOLOGY CLINIC | Facility: CLINIC | Age: 67
End: 2022-04-13

## 2022-04-13 ENCOUNTER — PREP FOR PROCEDURE (OUTPATIENT)
Dept: CARDIOLOGY CLINIC | Facility: CLINIC | Age: 67
End: 2022-04-13

## 2022-04-13 DIAGNOSIS — R06.00 DOE (DYSPNEA ON EXERTION): Primary | ICD-10-CM

## 2022-04-13 DIAGNOSIS — Z82.49 FAMILY HISTORY OF PREMATURE CORONARY ARTERY DISEASE: ICD-10-CM

## 2022-04-13 NOTE — TELEPHONE ENCOUNTER
----- Message from Kristina Morales MD sent at 4/12/2022  4:18 PM EDT -----  Echo and stress test results reviewed and discussed with the patient  Patient at present time denies chest pain or shortness of breath at rest but does get symptoms on exertion  He is taking aspirin and Toprol-XL  He was advised that he needs further evaluation coronary artery disease   Cardiac catheterization versus CT scan of coronaries  We have decided to proceed with cardiac catheterization    Imdur ordered    Please schedule this patient with me last week of this month

## 2022-04-13 NOTE — TELEPHONE ENCOUNTER
S/w patient, prescreening completed  Aware BW is needed prior and [facility] lab confirmed  No medication holds needed  Cath prep/info will be placed in mychart  Can we have auth for [facility] at [facility] on 4/27/22? Please and thank you!

## 2022-04-15 ENCOUNTER — TELEPHONE (OUTPATIENT)
Dept: CARDIOLOGY CLINIC | Facility: CLINIC | Age: 67
End: 2022-04-15

## 2022-04-15 DIAGNOSIS — F41.0 ANXIETY DISORDER DUE TO GENERAL MEDICAL CONDITION WITH PANIC ATTACK: Primary | ICD-10-CM

## 2022-04-15 DIAGNOSIS — F06.4 ANXIETY DISORDER DUE TO GENERAL MEDICAL CONDITION WITH PANIC ATTACK: Primary | ICD-10-CM

## 2022-04-15 DIAGNOSIS — I10 PRIMARY HYPERTENSION: ICD-10-CM

## 2022-04-15 RX ORDER — ALPRAZOLAM 0.25 MG/1
0.25 TABLET ORAL 2 TIMES DAILY PRN
Qty: 20 TABLET | Refills: 0 | Status: SHIPPED | OUTPATIENT
Start: 2022-04-15 | End: 2022-04-28 | Stop reason: HOSPADM

## 2022-04-15 RX ORDER — METOPROLOL SUCCINATE 25 MG/1
25 TABLET, EXTENDED RELEASE ORAL DAILY
Qty: 30 TABLET | Refills: 0 | Status: SHIPPED | OUTPATIENT
Start: 2022-04-15 | End: 2022-04-29 | Stop reason: SDUPTHER

## 2022-04-15 NOTE — TELEPHONE ENCOUNTER
Patient states he has a lot of anxiety and is asking if you can call him in some xanax  Patient asking you to call him directly and speak with him about this  He does has a upcoming apt with you but can't wait until then

## 2022-04-15 NOTE — TELEPHONE ENCOUNTER
Patient is currently on Metoprolol  Dr Laura Watkins prescribed Isosorbide mononitrate  Patient wants to know if he should take the isosorbide along with the metoprolol        [phone]

## 2022-04-15 NOTE — TELEPHONE ENCOUNTER
Called pt to discuss, we discussed risks/benefits/alternatives   He has anxiety regarding this cardiac workup, will provide short term xanax to be used prn at this helped him in the past whereas SSRIs did not

## 2022-04-19 ENCOUNTER — APPOINTMENT (OUTPATIENT)
Dept: LAB | Facility: CLINIC | Age: 67
End: 2022-04-19
Payer: COMMERCIAL

## 2022-04-19 DIAGNOSIS — R73.01 IFG (IMPAIRED FASTING GLUCOSE): ICD-10-CM

## 2022-04-19 DIAGNOSIS — R06.00 DOE (DYSPNEA ON EXERTION): ICD-10-CM

## 2022-04-19 DIAGNOSIS — Z82.49 FAMILY HISTORY OF PREMATURE CORONARY ARTERY DISEASE: ICD-10-CM

## 2022-04-19 DIAGNOSIS — E78.2 MIXED HYPERLIPIDEMIA: ICD-10-CM

## 2022-04-19 LAB
ERYTHROCYTE [DISTWIDTH] IN BLOOD BY AUTOMATED COUNT: 12.3 % (ref 11.6–15.1)
HCT VFR BLD AUTO: 45.8 % (ref 36.5–49.3)
HGB BLD-MCNC: 14.8 G/DL (ref 12–17)
INR PPP: 0.99 (ref 0.84–1.19)
MCH RBC QN AUTO: 30 PG (ref 26.8–34.3)
MCHC RBC AUTO-ENTMCNC: 32.3 G/DL (ref 31.4–37.4)
MCV RBC AUTO: 93 FL (ref 82–98)
PLATELET # BLD AUTO: 223 THOUSANDS/UL (ref 149–390)
PMV BLD AUTO: 11 FL (ref 8.9–12.7)
PROTHROMBIN TIME: 12.7 SECONDS (ref 11.6–14.5)
RBC # BLD AUTO: 4.94 MILLION/UL (ref 3.88–5.62)
WBC # BLD AUTO: 6.1 THOUSAND/UL (ref 4.31–10.16)

## 2022-04-19 PROCEDURE — 85610 PROTHROMBIN TIME: CPT

## 2022-04-19 PROCEDURE — 85027 COMPLETE CBC AUTOMATED: CPT

## 2022-04-19 PROCEDURE — 36415 COLL VENOUS BLD VENIPUNCTURE: CPT

## 2022-04-21 ENCOUNTER — OFFICE VISIT (OUTPATIENT)
Dept: FAMILY MEDICINE CLINIC | Facility: CLINIC | Age: 67
End: 2022-04-21
Payer: COMMERCIAL

## 2022-04-21 VITALS
HEART RATE: 83 BPM | BODY MASS INDEX: 24.8 KG/M2 | SYSTOLIC BLOOD PRESSURE: 118 MMHG | TEMPERATURE: 97.3 F | HEIGHT: 68 IN | DIASTOLIC BLOOD PRESSURE: 82 MMHG | OXYGEN SATURATION: 98 % | WEIGHT: 163.6 LBS

## 2022-04-21 DIAGNOSIS — I10 PRIMARY HYPERTENSION: Primary | ICD-10-CM

## 2022-04-21 DIAGNOSIS — F17.290 OTHER TOBACCO PRODUCT NICOTINE DEPENDENCE, UNCOMPLICATED: ICD-10-CM

## 2022-04-21 DIAGNOSIS — R94.39 ABNORMAL STRESS TEST: ICD-10-CM

## 2022-04-21 DIAGNOSIS — Z00.00 MEDICARE ANNUAL WELLNESS VISIT, INITIAL: ICD-10-CM

## 2022-04-21 DIAGNOSIS — Z12.5 SCREENING FOR PROSTATE CANCER: ICD-10-CM

## 2022-04-21 DIAGNOSIS — E78.2 MIXED HYPERLIPIDEMIA: ICD-10-CM

## 2022-04-21 LAB
MAX DIASTOLIC BP: 88 MMHG
MAX HEART RATE: 112 BPM
MAX PREDICTED HEART RATE: 152 BPM
MAX. SYSTOLIC BP: 184 MMHG
PROTOCOL NAME: NORMAL
REASON FOR TERMINATION: NORMAL
TARGET HR FORMULA: NORMAL
TEST INDICATION: NORMAL
TIME IN EXERCISE PHASE: NORMAL

## 2022-04-21 PROCEDURE — 1036F TOBACCO NON-USER: CPT | Performed by: PHYSICIAN ASSISTANT

## 2022-04-21 PROCEDURE — 3725F SCREEN DEPRESSION PERFORMED: CPT | Performed by: PHYSICIAN ASSISTANT

## 2022-04-21 PROCEDURE — 1160F RVW MEDS BY RX/DR IN RCRD: CPT | Performed by: PHYSICIAN ASSISTANT

## 2022-04-21 PROCEDURE — 1125F AMNT PAIN NOTED PAIN PRSNT: CPT | Performed by: PHYSICIAN ASSISTANT

## 2022-04-21 PROCEDURE — 1170F FXNL STATUS ASSESSED: CPT | Performed by: PHYSICIAN ASSISTANT

## 2022-04-21 PROCEDURE — 3288F FALL RISK ASSESSMENT DOCD: CPT | Performed by: PHYSICIAN ASSISTANT

## 2022-04-21 PROCEDURE — 99214 OFFICE O/P EST MOD 30 MIN: CPT | Performed by: PHYSICIAN ASSISTANT

## 2022-04-21 PROCEDURE — G0438 PPPS, INITIAL VISIT: HCPCS | Performed by: PHYSICIAN ASSISTANT

## 2022-04-21 PROCEDURE — 3008F BODY MASS INDEX DOCD: CPT | Performed by: PHYSICIAN ASSISTANT

## 2022-04-21 NOTE — PATIENT INSTRUCTIONS
Medicare Preventive Visit Patient Instructions  Thank you for completing your Welcome to Medicare Visit or Medicare Annual Wellness Visit today  Your next wellness visit will be due in one year (4/22/2023)  The screening/preventive services that you may require over the next 5-10 years are detailed below  Some tests may not apply to you based off risk factors and/or age  Screening tests ordered at today's visit but not completed yet may show as past due  Also, please note that scanned in results may not display below  Preventive Screenings:  Service Recommendations Previous Testing/Comments   Colorectal Cancer Screening  · Colonoscopy    · Fecal Occult Blood Test (FOBT)/Fecal Immunochemical Test (FIT)  · Fecal DNA/Cologuard Test  · Flexible Sigmoidoscopy Age: 54-65 years old   Colonoscopy: every 10 years (May be performed more frequently if at higher risk)  OR  FOBT/FIT: every 1 year  OR  Cologuard: every 3 years  OR  Sigmoidoscopy: every 5 years  Screening may be recommended earlier than age 48 if at higher risk for colorectal cancer  Also, an individualized decision between you and your healthcare provider will decide whether screening between the ages of 74-80 would be appropriate   Colonoscopy: Not on file  FOBT/FIT: Not on file  Cologuard: Not on file  Sigmoidoscopy: Not on file          Prostate Cancer Screening Individualized decision between patient and health care provider in men between ages of 53-78   Medicare will cover every 12 months beginning on the day after your 50th birthday PSA: No results in last 5 years           Hepatitis C Screening Once for adults born between [facility]  More frequently in patients at high risk for Hepatitis C Hep C Antibody: Not on file        Diabetes Screening 1-2 times per year if you're at risk for diabetes or have pre-diabetes Fasting glucose: 116 mg/dL   A1C: No results in last 5 years    Screening Current   Cholesterol Screening Once every 5 years if you don't have a lipid disorder  May order more often based on risk factors  Lipid panel: 03/23/2022    Screening Not Indicated  History Lipid Disorder      Other Preventive Screenings Covered by Medicare:  1  Abdominal Aortic Aneurysm (AAA) Screening: covered once if your at risk  You're considered to be at risk if you have a family history of AAA or a male between the age of 73-68 who smoking at least 100 cigarettes in your lifetime  2  Lung Cancer Screening: covers low dose CT scan once per year if you meet all of the following conditions: (1) Age 50-69; (2) No signs or symptoms of lung cancer; (3) Current smoker or have quit smoking within the last 15 years; (4) You have a tobacco smoking history of at least 30 pack years (packs per day x number of years you smoked); (5) You get a written order from a healthcare provider  3  Glaucoma Screening: covered annually if you're considered high risk: (1) You have diabetes OR (2) Family history of glaucoma OR (3)  aged 48 and older OR (3)  American aged 72 and older  3  Osteoporosis Screening: covered every 2 years if you meet one of the following conditions: (1) Have a vertebral abnormality; (2) On glucocorticoid therapy for more than 3 months; (3) Have primary hyperparathyroidism; (4) On osteoporosis medications and need to assess response to drug therapy  5  HIV Screening: covered annually if you're between the age of 12-76  Also covered annually if you are younger than 13 and older than 72 with risk factors for HIV infection  For pregnant patients, it is covered up to 3 times per pregnancy      Immunizations:  Immunization Recommendations   Influenza Vaccine Annual influenza vaccination during flu season is recommended for all persons aged >= 6 months who do not have contraindications   Pneumococcal Vaccine (Prevnar and Pneumovax)  * Prevnar = PCV13  * Pneumovax = PPSV23 Adults 25-60 years old: 1-3 doses may be recommended based on certain risk factors  Adults 72 years old: Prevnar (PCV13) vaccine recommended followed by Pneumovax (PPSV23) vaccine  If already received PPSV23 since turning 65, then PCV13 recommended at least one year after PPSV23 dose  Hepatitis B Vaccine 3 dose series if at intermediate or high risk (ex: diabetes, end stage renal disease, liver disease)   Tetanus (Td) Vaccine - COST NOT COVERED BY MEDICARE PART B Following completion of primary series, a booster dose should be given every 10 years to maintain immunity against tetanus  Td may also be given as tetanus wound prophylaxis  Tdap Vaccine - COST NOT COVERED BY MEDICARE PART B Recommended at least once for all adults  For pregnant patients, recommended with each pregnancy  Shingles Vaccine (Shingrix) - COST NOT COVERED BY MEDICARE PART B  2 shot series recommended in those aged 48 and above     Health Maintenance Due:      Topic Date Due    Hepatitis C Screening  Never done    Colorectal Cancer Screening  Never done     Immunizations Due:      Topic Date Due    DTaP,Tdap,and Td Vaccines (1 - Tdap) Never done    Pneumococcal Vaccine: 65+ Years (1 of 1 - PPSV23) Never done    COVID-19 Vaccine (2 - Booster for I [employer] series) 06/29/2021    Influenza Vaccine (1) Never done     Advance Directives   What are advance directives? Advance directives are legal documents that state your wishes and plans for medical care  These plans are made ahead of time in case you lose your ability to make decisions for yourself  Advance directives can apply to any medical decision, such as the treatments you want, and if you want to donate organs  What are the types of advance directives? There are many types of advance directives, and each state has rules about how to use them  You may choose a combination of any of the following:  · Living will: This is a written record of the treatment you want   You can also choose which treatments you do not want, which to limit, and which to stop at a certain time  This includes surgery, medicine, IV fluid, and tube feedings  · Durable power of  for healthcare [facility]): This is a written record that states who you want to make healthcare choices for you when you are unable to make them for yourself  This person, called a proxy, is usually a family member or a friend  You may choose more than 1 proxy  · Do not resuscitate (DNR) order:  A DNR order is used in case your heart stops beating or you stop breathing  It is a request not to have certain forms of treatment, such as CPR  A DNR order may be included in other types of advance directives  · Medical directive: This covers the care that you want if you are in a coma, near death, or unable to make decisions for yourself  You can list the treatments you want for each condition  Treatment may include pain medicine, surgery, blood transfusions, dialysis, IV or tube feedings, and a ventilator (breathing machine)  · Values history: This document has questions about your views, beliefs, and how you feel and think about life  This information can help others choose the care that you would choose  Why are advance directives important? An advance directive helps you control your care  Although spoken wishes may be used, it is better to have your wishes written down  Spoken wishes can be misunderstood, or not followed  Treatments may be given even if you do not want them  An advance directive may make it easier for your family to make difficult choices about your care  Fall Prevention    Fall prevention  includes ways to make your home and other areas safer  It also includes ways you can move more carefully to prevent a fall  Health conditions that cause changes in your blood pressure, vision, or muscle strength and coordination may increase your risk for falls  Medicines may also increase your risk for falls if they make you dizzy, weak, or sleepy  Fall prevention tips:   · Stand or sit up slowly  · Use assistive devices as directed  · Wear shoes that fit well and have soles that   · Wear a personal alarm  · Stay active  · Manage your medical conditions  Home Safety Tips:  · Add items to prevent falls in the bathroom  · Keep paths clear  · Install bright lights in your home  · Keep items you use often on shelves within reach  · Paint or place reflective tape on the edges of your stairs  © Copyright [employer] 2018 Information is for End User's use only and may not be sold, redistributed or otherwise used for commercial purposes   All illustrations and images included in CareNotes® are the copyrighted property of [employer]  or [address] [place] [address]

## 2022-04-21 NOTE — PROGRESS NOTES
Assessment and Plan:     Problem List Items Addressed This Visit        Cardiovascular and Mediastinum    Primary hypertension - Primary       Other    Nicotine dependence    Relevant Orders    CT lung screening program    Mixed hyperlipidemia    Medicare annual wellness visit, initial      Other Visit Diagnoses     Screening for prostate cancer        Relevant Orders    PSA, Total Screen    Abnormal stress test               Preventive health issues were discussed with patient, and age appropriate screening tests were ordered as noted in patient's After Visit Summary  Personalized health advice and appropriate referrals for health education or preventive services given if needed, as noted in patient's After Visit Summary       History of Present Illness:     Patient presents for Medicare Annual Wellness visit    Patient Care Team:  Marissa Stanford PA-C as PCP - General (Family Medicine)     Problem List:     Patient Active Problem List   Diagnosis    Other chest pain    Primary hypertension    LOPEZ (dyspnea on exertion)    Nicotine dependence    Mixed hyperlipidemia    Medicare annual wellness visit, initial      Past Medical and Surgical History:     Past Medical History:   Diagnosis Date    GERD (gastroesophageal reflux disease)      Past Surgical History:   Procedure Laterality Date    HERNIA REPAIR        Family History:     Family History   Problem Relation Age of Onset    Cancer Mother     Heart attack Father         passed at 64    Cancer Sister       Social History:     Social History     Socioeconomic History    Marital status: Single     Spouse name: None    Number of children: None    Years of education: None    Highest education level: None   Occupational History    None   Tobacco Use    Smoking status: Former Smoker     Quit date: 2012     Years since quitting: 10 3    Smokeless tobacco: Never Used   Vaping Use    Vaping Use: Every day    Substances: Nicotine   Substance and Sexual Activity    Alcohol use: Not Currently     Comment: recovering alcoholic    Drug use: Yes     Types: Marijuana     Comment: medical marijuana card    Sexual activity: Not Currently   Other Topics Concern    None   Social History Narrative    None     Social Determinants of Health     Financial Resource Strain: Not on file   Food Insecurity: Not on file   Transportation Needs: Not on file   Physical Activity: Not on file   Stress: Not on file   Social Connections: Not on file   Intimate Partner Violence: Not on file   Housing Stability: Not on file      Medications and Allergies:     Current Outpatient Medications   Medication Sig Dispense Refill    ALPRAZolam (XANAX) 0 25 mg tablet Take 1 tablet (0 25 mg total) by mouth 2 (two) times a day as needed for anxiety 20 tablet 0    aspirin (ECOTRIN LOW STRENGTH) 81 mg EC tablet Take 1 tablet (81 mg total) by mouth daily 30 tablet 2    atorvastatin (LIPITOR) 20 mg tablet Take 1 tablet (20 mg total) by mouth daily (Patient not taking: Reported on 4/5/2022 ) 90 tablet 3    isosorbide mononitrate (IMDUR) 30 mg 24 hr tablet Take 1 tablet (30 mg total) by mouth daily 30 tablet 3    Lansoprazole (PREVACID PO) Take by mouth      metoprolol succinate (TOPROL-XL) 25 mg 24 hr tablet Take 1 tablet (25 mg total) by mouth daily 30 tablet 0     No current facility-administered medications for this visit       No Known Allergies   Immunizations:     Immunization History   Administered Date(s) Administered    COVID-19 J&J (Hermelindo) vaccine 0 5 mL 05/04/2021      Health Maintenance:         Topic Date Due    Hepatitis C Screening  Never done    Colorectal Cancer Screening  Never done         Topic Date Due    DTaP,Tdap,and Td Vaccines (1 - Tdap) Never done    Pneumococcal Vaccine: 65+ Years (1 of 1 - PPSV23) Never done    COVID-19 Vaccine (2 - Booster for [employer] series) 06/29/2021    Influenza Vaccine (1) Never done      Medicare Health Risk Assessment:     /82 Pulse 83   Temp (!) 97 3 °F (36 3 °C)   Ht 5' 8" (1 727 m)   Wt 74 2 kg (163 lb 9 6 oz)   SpO2 98%   BMI 24 88 kg/m²      Kimmy Christianson is here for his Subsequent Wellness visit  Health Risk Assessment:   Patient rates overall health as fair  Patient feels that their physical health rating is slightly worse  Patient is satisfied with their life  Eyesight was rated as same  Hearing was rated as same  Patient feels that their emotional and mental health rating is same  Patients states they are never, rarely angry  Patient states they are never, rarely unusually tired/fatigued  Pain experienced in the last 7 days has been none  Patient states that he has experienced no weight loss or gain in last 6 months  Depression Screening:   PHQ-2 Score: 0      Fall Risk Screening: In the past year, patient has experienced: history of falling in past year    Number of falls: 1  Injured during fall?: Yes    Feels unsteady when standing or walking?: No    Worried about falling?: No      Home Safety:  Patient does not have trouble with stairs inside or outside of their home  Patient has working smoke alarms and has working carbon monoxide detector  Home safety hazards include: none  Nutrition:   Current diet is Regular  Medications:   Patient is currently taking over-the-counter supplements  OTC medications include: see medication list  Patient is able to manage medications  Activities of Daily Living (ADLs)/Instrumental Activities of Daily Living (IADLs):   Walk and transfer into and out of bed and chair?: Yes  Dress and groom yourself?: Yes    Bathe or shower yourself?: Yes    Feed yourself?  Yes  Do your laundry/housekeeping?: Yes  Manage your money, pay your bills and track your expenses?: Yes  Make your own meals?: Yes    Do your own shopping?: Yes    Previous Hospitalizations:   Any hospitalizations or ED visits within the last 12 months?: No      Advance Care Planning:   Living will: Yes    Durable POA for healthcare: Yes    Advanced directive: Yes    Advanced directive counseling given: Yes    Five wishes given: Yes      PREVENTIVE SCREENINGS      Cardiovascular Screening:    General: Screening Not Indicated and History Lipid Disorder      Diabetes Screening:     General: Screening Current      Colorectal Cancer Screening:     General: Risks and Benefits Discussed    Due for: Cologuard      Prostate Cancer Screening:    General: Risks and Benefits Discussed      Osteoporosis Screening:    General: Risks and Benefits Discussed and Screening Not Indicated      Abdominal Aortic Aneurysm (AAA) Screening:    Risk factors include: age between 73-67 yo and tobacco use        Lung Cancer Screening:     General: Risks and Benefits Discussed    Due for: Low Dose CT (LDCT)      Hepatitis C Screening:    General: Risks and Benefits Discussed    Screening, Brief Intervention, and Referral to Treatment (SBIRT)    Screening  Typical number of drinks in a day: 0  Typical number of drinks in a week: 0  Interpretation: Low risk drinking behavior      Single Item Drug Screening:  How often have you used an illegal drug (including marijuana) or a prescription medication for non-medical reasons in the past year? never    Single Item Drug Screen Score: 0  Interpretation: Negative screen for possible drug use disorder      Rowdy Plasencia PA-C

## 2022-04-21 NOTE — PROGRESS NOTES
Assessment/Plan:       Problem List Items Addressed This Visit        Cardiovascular and Mediastinum    Primary hypertension - Primary       Other    Nicotine dependence    Relevant Orders    CT lung screening program    Mixed hyperlipidemia    Medicare annual wellness visit, initial      Other Visit Diagnoses     Screening for prostate cancer        Relevant Orders    PSA, Total Screen    Abnormal stress test            BP much better controlled, stable cardiac complaints  Highly recommended pt start statin  Continue ASA, BB, imdur  Cath next week  Smoking cessation  3 month follow up with labs  Earlier prn    Subjective:      Patient ID: Heron Diallo is a 74 y o  male  Pt presents for 1 month follow up  Established with myself 1 month ago, had exertional cardiac complaints  Pt had abnormal NM stress testing, will be undergoing cath next week  Following with cardiology now  On ASA, imdur, toprol  Hesitant to start statin  BP much better controlled  His echo was unremarkable  Has been "taking it easy" therefore not having as much CP  He is very anxious about his upcoming catheterization  He continues to vape, he previous smoked for 30+ years  The following portions of the patient's history were reviewed and updated as appropriate:   He  has a past medical history of GERD (gastroesophageal reflux disease)  He   Patient Active Problem List    Diagnosis Date Noted    Mixed hyperlipidemia 04/21/2022    Medicare annual wellness visit, initial 04/21/2022    Other chest pain 03/22/2022    Primary hypertension 03/22/2022    LOPEZ (dyspnea on exertion) 03/22/2022    Nicotine dependence 03/22/2022     He  has a past surgical history that includes Hernia repair  His family history includes Cancer in his mother and sister; Heart attack in his father  He  reports that he quit smoking about 10 years ago  He has never used smokeless tobacco  He reports previous alcohol use  He reports current drug use   Drug: Marijuana  Current Outpatient Medications   Medication Sig Dispense Refill    ALPRAZolam (XANAX) 0 25 mg tablet Take 1 tablet (0 25 mg total) by mouth 2 (two) times a day as needed for anxiety 20 tablet 0    aspirin (ECOTRIN LOW STRENGTH) 81 mg EC tablet Take 1 tablet (81 mg total) by mouth daily 30 tablet 2    atorvastatin (LIPITOR) 20 mg tablet Take 1 tablet (20 mg total) by mouth daily (Patient not taking: Reported on 4/5/2022 ) 90 tablet 3    isosorbide mononitrate (IMDUR) 30 mg 24 hr tablet Take 1 tablet (30 mg total) by mouth daily 30 tablet 3    Lansoprazole (PREVACID PO) Take by mouth      metoprolol succinate (TOPROL-XL) 25 mg 24 hr tablet Take 1 tablet (25 mg total) by mouth daily 30 tablet 0     No current facility-administered medications for this visit  Current Outpatient Medications on File Prior to Visit   Medication Sig    ALPRAZolam (XANAX) 0 25 mg tablet Take 1 tablet (0 25 mg total) by mouth 2 (two) times a day as needed for anxiety    aspirin (ECOTRIN LOW STRENGTH) 81 mg EC tablet Take 1 tablet (81 mg total) by mouth daily    atorvastatin (LIPITOR) 20 mg tablet Take 1 tablet (20 mg total) by mouth daily (Patient not taking: Reported on 4/5/2022 )    isosorbide mononitrate (IMDUR) 30 mg 24 hr tablet Take 1 tablet (30 mg total) by mouth daily    Lansoprazole (PREVACID PO) Take by mouth    metoprolol succinate (TOPROL-XL) 25 mg 24 hr tablet Take 1 tablet (25 mg total) by mouth daily     No current facility-administered medications on file prior to visit  He has No Known Allergies       Review of Systems   Constitutional: Negative for chills, fatigue and fever  HENT: Negative for congestion, ear pain, hearing loss, nosebleeds, postnasal drip, rhinorrhea, sinus pressure, sinus pain, sneezing and sore throat  Eyes: Negative for pain, discharge, itching and visual disturbance  Respiratory: Negative for cough, chest tightness, shortness of breath and wheezing  Cardiovascular: Positive for chest pain  Negative for palpitations and leg swelling  Gastrointestinal: Negative for abdominal pain, blood in stool, constipation, diarrhea, nausea and vomiting  Genitourinary: Negative for frequency and urgency  Neurological: Negative for dizziness, light-headedness and numbness  Psychiatric/Behavioral: The patient is nervous/anxious  Objective:      /82   Pulse 83   Temp (!) 97 3 °F (36 3 °C)   Ht 5' 8" (1 727 m)   Wt 74 2 kg (163 lb 9 6 oz)   SpO2 98%   BMI 24 88 kg/m²          Physical Exam  Vitals and nursing note reviewed  Constitutional:       General: He is not in acute distress  Appearance: Normal appearance  HENT:      Head: Normocephalic and atraumatic  Nose: Nose normal    Eyes:      Pupils: Pupils are equal, round, and reactive to light  Cardiovascular:      Rate and Rhythm: Normal rate and regular rhythm  Heart sounds: Normal heart sounds  No murmur heard  Pulmonary:      Effort: Pulmonary effort is normal  No respiratory distress  Breath sounds: Normal breath sounds  No wheezing, rhonchi or rales  Musculoskeletal:         General: Normal range of motion  Cervical back: Normal range of motion and neck supple  Skin:     General: Skin is warm and dry  Neurological:      Mental Status: He is alert and oriented to person, place, and time     Psychiatric:         Mood and Affect: Mood and affect normal

## 2022-04-22 NOTE — TELEPHONE ENCOUNTER
S/w patient, informed that a BMP order needed for upcoming procedure has not been completed  Informed that order has been placed and stated that he will have done prior

## 2022-04-25 ENCOUNTER — APPOINTMENT (OUTPATIENT)
Dept: LAB | Facility: CLINIC | Age: 67
End: 2022-04-25
Payer: COMMERCIAL

## 2022-04-25 DIAGNOSIS — Z12.5 SCREENING FOR PROSTATE CANCER: ICD-10-CM

## 2022-04-25 LAB
ANION GAP SERPL CALCULATED.3IONS-SCNC: 3 MMOL/L (ref 4–13)
BUN SERPL-MCNC: 23 MG/DL (ref 5–25)
CALCIUM SERPL-MCNC: 9.1 MG/DL (ref 8.3–10.1)
CHLORIDE SERPL-SCNC: 103 MMOL/L (ref 100–108)
CO2 SERPL-SCNC: 29 MMOL/L (ref 21–32)
CREAT SERPL-MCNC: 1.21 MG/DL (ref 0.6–1.3)
GFR SERPL CREATININE-BSD FRML MDRD: 61 ML/MIN/1.73SQ M
GLUCOSE P FAST SERPL-MCNC: 113 MG/DL (ref 65–99)
POTASSIUM SERPL-SCNC: 4.7 MMOL/L (ref 3.5–5.3)
SODIUM SERPL-SCNC: 135 MMOL/L (ref 136–145)

## 2022-04-25 PROCEDURE — 80048 BASIC METABOLIC PNL TOTAL CA: CPT

## 2022-04-25 PROCEDURE — 36415 COLL VENOUS BLD VENIPUNCTURE: CPT

## 2022-04-27 ENCOUNTER — HOSPITAL ENCOUNTER (OUTPATIENT)
Facility: HOSPITAL | Age: 67
Setting detail: OUTPATIENT SURGERY
Discharge: HOME | End: 2022-04-28
Attending: INTERNAL MEDICINE | Admitting: INTERNAL MEDICINE
Payer: COMMERCIAL

## 2022-04-27 DIAGNOSIS — R06.00 DOE (DYSPNEA ON EXERTION): ICD-10-CM

## 2022-04-27 DIAGNOSIS — I25.10 CAD (CORONARY ARTERY DISEASE): Primary | ICD-10-CM

## 2022-04-27 DIAGNOSIS — E78.2 MIXED HYPERLIPIDEMIA: ICD-10-CM

## 2022-04-27 DIAGNOSIS — Z82.49 FAMILY HISTORY OF PREMATURE CORONARY ARTERY DISEASE: ICD-10-CM

## 2022-04-27 DIAGNOSIS — R07.89 OTHER CHEST PAIN: Primary | ICD-10-CM

## 2022-04-27 LAB
ANION GAP SERPL CALCULATED.3IONS-SCNC: 7 MMOL/L (ref 4–13)
BUN SERPL-MCNC: 24 MG/DL (ref 5–25)
CALCIUM SERPL-MCNC: 8.7 MG/DL (ref 8.3–10.1)
CHLORIDE SERPL-SCNC: 105 MMOL/L (ref 100–108)
CO2 SERPL-SCNC: 27 MMOL/L (ref 21–32)
CREAT SERPL-MCNC: 1.55 MG/DL (ref 0.6–1.3)
GFR SERPL CREATININE-BSD FRML MDRD: 45 ML/MIN/1.73SQ M
GLUCOSE P FAST SERPL-MCNC: 118 MG/DL (ref 65–99)
GLUCOSE SERPL-MCNC: 118 MG/DL (ref 65–140)
KCT BLD-ACNC: 265 SEC (ref 89–137)
KCT BLD-ACNC: 414 SEC (ref 89–137)
POTASSIUM SERPL-SCNC: 5 MMOL/L (ref 3.5–5.3)
SODIUM SERPL-SCNC: 139 MMOL/L (ref 136–145)
SPECIMEN SOURCE: ABNORMAL
SPECIMEN SOURCE: ABNORMAL

## 2022-04-27 PROCEDURE — 99152 MOD SED SAME PHYS/QHP 5/>YRS: CPT | Performed by: INTERNAL MEDICINE

## 2022-04-27 PROCEDURE — C1894 INTRO/SHEATH, NON-LASER: HCPCS | Performed by: INTERNAL MEDICINE

## 2022-04-27 PROCEDURE — C1769 GUIDE WIRE: HCPCS | Performed by: INTERNAL MEDICINE

## 2022-04-27 PROCEDURE — C1725 CATH, TRANSLUMIN NON-LASER: HCPCS | Performed by: INTERNAL MEDICINE

## 2022-04-27 PROCEDURE — NC001 PR NO CHARGE: Performed by: INTERNAL MEDICINE

## 2022-04-27 PROCEDURE — 80048 BASIC METABOLIC PNL TOTAL CA: CPT | Performed by: INTERNAL MEDICINE

## 2022-04-27 PROCEDURE — 93458 L HRT ARTERY/VENTRICLE ANGIO: CPT | Performed by: INTERNAL MEDICINE

## 2022-04-27 PROCEDURE — C1753 CATH, INTRAVAS ULTRASOUND: HCPCS | Performed by: INTERNAL MEDICINE

## 2022-04-27 PROCEDURE — 85347 COAGULATION TIME ACTIVATED: CPT

## 2022-04-27 PROCEDURE — C9600 PERC DRUG-EL COR STENT SING: HCPCS | Performed by: INTERNAL MEDICINE

## 2022-04-27 PROCEDURE — C1874 STENT, COATED/COV W/DEL SYS: HCPCS | Performed by: INTERNAL MEDICINE

## 2022-04-27 PROCEDURE — 92978 ENDOLUMINL IVUS OCT C 1ST: CPT | Performed by: INTERNAL MEDICINE

## 2022-04-27 PROCEDURE — 99153 MOD SED SAME PHYS/QHP EA: CPT | Performed by: INTERNAL MEDICINE

## 2022-04-27 PROCEDURE — C1887 CATHETER, GUIDING: HCPCS | Performed by: INTERNAL MEDICINE

## 2022-04-27 PROCEDURE — 92928 PRQ TCAT PLMT NTRAC ST 1 LES: CPT | Performed by: INTERNAL MEDICINE

## 2022-04-27 DEVICE — XIENCE SKYPOINT™ EVEROLIMUS ELUTING CORONARY STENT SYSTEM 2.75 MM X 38 MM / RAPID-EXCHANGE
Type: IMPLANTABLE DEVICE | Site: CORONARY | Status: FUNCTIONAL
Brand: XIENCE SKYPOINT™

## 2022-04-27 RX ORDER — NITROGLYCERIN 20 MG/100ML
INJECTION INTRAVENOUS AS NEEDED
Status: DISCONTINUED | OUTPATIENT
Start: 2022-04-27 | End: 2022-04-27 | Stop reason: HOSPADM

## 2022-04-27 RX ORDER — LIDOCAINE WITH 8.4% SOD BICARB 0.9%(10ML)
SYRINGE (ML) INJECTION AS NEEDED
Status: DISCONTINUED | OUTPATIENT
Start: 2022-04-27 | End: 2022-04-27 | Stop reason: HOSPADM

## 2022-04-27 RX ORDER — ATORVASTATIN CALCIUM 20 MG/1
20 TABLET, FILM COATED ORAL
Status: DISCONTINUED | OUTPATIENT
Start: 2022-04-27 | End: 2022-04-28

## 2022-04-27 RX ORDER — FENTANYL CITRATE 50 UG/ML
INJECTION, SOLUTION INTRAMUSCULAR; INTRAVENOUS AS NEEDED
Status: DISCONTINUED | OUTPATIENT
Start: 2022-04-27 | End: 2022-04-27 | Stop reason: HOSPADM

## 2022-04-27 RX ORDER — SODIUM CHLORIDE 9 MG/ML
125 INJECTION, SOLUTION INTRAVENOUS CONTINUOUS
Status: DISPENSED | OUTPATIENT
Start: 2022-04-27 | End: 2022-04-27

## 2022-04-27 RX ORDER — METOPROLOL SUCCINATE 25 MG/1
25 TABLET, EXTENDED RELEASE ORAL DAILY
Status: DISCONTINUED | OUTPATIENT
Start: 2022-04-28 | End: 2022-04-28 | Stop reason: HOSPADM

## 2022-04-27 RX ORDER — SODIUM CHLORIDE 9 MG/ML
75 INJECTION, SOLUTION INTRAVENOUS CONTINUOUS
Status: DISPENSED | OUTPATIENT
Start: 2022-04-27 | End: 2022-04-27

## 2022-04-27 RX ORDER — VERAPAMIL HCL 2.5 MG/ML
AMPUL (ML) INTRAVENOUS AS NEEDED
Status: DISCONTINUED | OUTPATIENT
Start: 2022-04-27 | End: 2022-04-27 | Stop reason: HOSPADM

## 2022-04-27 RX ORDER — ACETAMINOPHEN 325 MG/1
650 TABLET ORAL EVERY 8 HOURS PRN
Status: DISCONTINUED | OUTPATIENT
Start: 2022-04-27 | End: 2022-04-28 | Stop reason: HOSPADM

## 2022-04-27 RX ORDER — ONDANSETRON 2 MG/ML
4 INJECTION INTRAMUSCULAR; INTRAVENOUS EVERY 6 HOURS PRN
Status: DISCONTINUED | OUTPATIENT
Start: 2022-04-27 | End: 2022-04-28 | Stop reason: HOSPADM

## 2022-04-27 RX ORDER — ASPIRIN 81 MG/1
81 TABLET ORAL DAILY
Status: DISCONTINUED | OUTPATIENT
Start: 2022-04-27 | End: 2022-04-28 | Stop reason: HOSPADM

## 2022-04-27 RX ORDER — HEPARIN SODIUM 1000 [USP'U]/ML
INJECTION, SOLUTION INTRAVENOUS; SUBCUTANEOUS AS NEEDED
Status: DISCONTINUED | OUTPATIENT
Start: 2022-04-27 | End: 2022-04-27 | Stop reason: HOSPADM

## 2022-04-27 RX ORDER — MIDAZOLAM HYDROCHLORIDE 2 MG/2ML
INJECTION, SOLUTION INTRAMUSCULAR; INTRAVENOUS AS NEEDED
Status: DISCONTINUED | OUTPATIENT
Start: 2022-04-27 | End: 2022-04-27 | Stop reason: HOSPADM

## 2022-04-27 RX ORDER — ISOSORBIDE MONONITRATE 30 MG/1
30 TABLET, EXTENDED RELEASE ORAL DAILY
Status: DISCONTINUED | OUTPATIENT
Start: 2022-04-28 | End: 2022-04-28 | Stop reason: HOSPADM

## 2022-04-27 RX ORDER — ALPRAZOLAM 0.25 MG/1
0.25 TABLET ORAL 2 TIMES DAILY PRN
Status: DISCONTINUED | OUTPATIENT
Start: 2022-04-27 | End: 2022-04-28 | Stop reason: HOSPADM

## 2022-04-27 RX ADMIN — SODIUM CHLORIDE 75 ML/HR: 0.9 INJECTION, SOLUTION INTRAVENOUS at 12:57

## 2022-04-27 RX ADMIN — SODIUM CHLORIDE 125 ML/HR: 0.9 INJECTION, SOLUTION INTRAVENOUS at 09:17

## 2022-04-27 RX ADMIN — TICAGRELOR 90 MG: 90 TABLET ORAL at 22:57

## 2022-04-27 NOTE — INTERVAL H&P NOTE
Update: (This section must be completed if the H&P was completed greater than 24 hrs to procedure or admission)    H&P reviewed  After examining the patient, I find no changed to the H&P since it had been written  Patient re-evaluated  Accept as history and physical   Labs including vitals reviewed  risks, benefits and alternatives of cardiac catheterization including possible coronary intervention explained to the patient    Patient understands and agrees  Darin Mckinnon MD/April 27, 2022/9:09 AM

## 2022-04-27 NOTE — Clinical Note
Defib pad site: left lower flank  Defib pad site: rt upperchest Defib pad site assessment: skin integrity intact

## 2022-04-27 NOTE — PROGRESS NOTES
Patient was noted to have trending up creatinine last 1 was 1 2 now 1 5  Patient denies taking any Advil  He was explained about risk of worsening renal function including contrast induced nephropathy    Patient understands and would like to proceed  Will hydrate him

## 2022-04-27 NOTE — Clinical Note
Prepped: groin and right brachial  Prepped with: ChloraPrep  The site was clipped  The patient was draped

## 2022-04-27 NOTE — PLAN OF CARE
Problem: Potential for Falls  Goal: Patient will remain free of falls  Description: INTERVENTIONS:  - Educate patient/family on patient safety including physical limitations  - Instruct patient to call for assistance with activity   - Consult OT/PT to assist with strengthening/mobility   - Keep Call bell within reach  - Keep bed low and locked with side rails adjusted as appropriate  - Keep care items and personal belongings within reach  - Initiate and maintain comfort rounds  - Make Fall Risk Sign visible to staff  - Offer Toileting every 2 Hours, in advance of need  - Initiate/Maintain 2alarm  - Obtain necessary fall risk management equipment: 2  - Apply yellow socks and bracelet for high fall risk patients  - Consider moving patient to room near nurses station  Outcome: Progressing     Problem: CARDIOVASCULAR - ADULT  Goal: Maintains optimal cardiac output and hemodynamic stability  Description: INTERVENTIONS:  - Monitor I/O, vital signs and rhythm  - Monitor for S/S and trends of decreased cardiac output  - Administer and titrate ordered vasoactive medications to optimize hemodynamic stability  - Assess quality of pulses, skin color and temperature  - Assess for signs of decreased coronary artery perfusion  - Instruct patient to report change in severity of symptoms  Outcome: Progressing     Problem: METABOLIC, FLUID AND ELECTROLYTES - ADULT  Goal: Electrolytes maintained within normal limits  Description: INTERVENTIONS:  - Monitor labs and assess patient for signs and symptoms of electrolyte imbalances  - Administer electrolyte replacement as ordered  - Monitor response to electrolyte replacements, including repeat lab results as appropriate  - Instruct patient on fluid and nutrition as appropriate  Outcome: Progressing  Goal: Fluid balance maintained  Description: INTERVENTIONS:  - Monitor labs   - Monitor I/O and WT  - Instruct patient on fluid and nutrition as appropriate  - Assess for signs & symptoms of volume excess or deficit  Outcome: Progressing  Goal: Glucose maintained within target range  Description: INTERVENTIONS:  - Monitor Blood Glucose as ordered  - Assess for signs and symptoms of hyperglycemia and hypoglycemia  - Administer ordered medications to maintain glucose within target range  - Assess nutritional intake and initiate nutrition service referral as needed  Outcome: Progressing

## 2022-04-28 ENCOUNTER — TELEPHONE (OUTPATIENT)
Dept: CARDIOLOGY CLINIC | Facility: CLINIC | Age: 67
End: 2022-04-28

## 2022-04-28 VITALS
RESPIRATION RATE: 18 BRPM | HEART RATE: 80 BPM | WEIGHT: 165.12 LBS | SYSTOLIC BLOOD PRESSURE: 141 MMHG | OXYGEN SATURATION: 96 % | BODY MASS INDEX: 25.03 KG/M2 | TEMPERATURE: 98.2 F | HEIGHT: 68 IN | DIASTOLIC BLOOD PRESSURE: 85 MMHG

## 2022-04-28 LAB
ANION GAP SERPL CALCULATED.3IONS-SCNC: 3 MMOL/L (ref 4–13)
BUN SERPL-MCNC: 17 MG/DL (ref 5–25)
CALCIUM SERPL-MCNC: 8.7 MG/DL (ref 8.3–10.1)
CHLORIDE SERPL-SCNC: 106 MMOL/L (ref 100–108)
CO2 SERPL-SCNC: 28 MMOL/L (ref 21–32)
CREAT SERPL-MCNC: 1.04 MG/DL (ref 0.6–1.3)
ERYTHROCYTE [DISTWIDTH] IN BLOOD BY AUTOMATED COUNT: 12.3 % (ref 11.6–15.1)
GFR SERPL CREATININE-BSD FRML MDRD: 73 ML/MIN/1.73SQ M
GLUCOSE P FAST SERPL-MCNC: 108 MG/DL (ref 65–99)
GLUCOSE SERPL-MCNC: 108 MG/DL (ref 65–140)
HCT VFR BLD AUTO: 42.5 % (ref 36.5–49.3)
HGB BLD-MCNC: 14.3 G/DL (ref 12–17)
MCH RBC QN AUTO: 30.6 PG (ref 26.8–34.3)
MCHC RBC AUTO-ENTMCNC: 33.6 G/DL (ref 31.4–37.4)
MCV RBC AUTO: 91 FL (ref 82–98)
PLATELET # BLD AUTO: 205 THOUSANDS/UL (ref 149–390)
PMV BLD AUTO: 10.1 FL (ref 8.9–12.7)
POTASSIUM SERPL-SCNC: 4 MMOL/L (ref 3.5–5.3)
RBC # BLD AUTO: 4.68 MILLION/UL (ref 3.88–5.62)
SODIUM SERPL-SCNC: 137 MMOL/L (ref 136–145)
WBC # BLD AUTO: 6.55 THOUSAND/UL (ref 4.31–10.16)

## 2022-04-28 PROCEDURE — 80048 BASIC METABOLIC PNL TOTAL CA: CPT | Performed by: PHYSICIAN ASSISTANT

## 2022-04-28 PROCEDURE — NC001 PR NO CHARGE: Performed by: INTERNAL MEDICINE

## 2022-04-28 PROCEDURE — 85027 COMPLETE CBC AUTOMATED: CPT | Performed by: PHYSICIAN ASSISTANT

## 2022-04-28 RX ORDER — ATORVASTATIN CALCIUM 80 MG/1
80 TABLET, FILM COATED ORAL
Qty: 90 TABLET | Refills: 3 | Status: SHIPPED | OUTPATIENT
Start: 2022-04-28 | End: 2022-05-17 | Stop reason: SDUPTHER

## 2022-04-28 RX ORDER — ATORVASTATIN CALCIUM 40 MG/1
80 TABLET, FILM COATED ORAL
Status: DISCONTINUED | OUTPATIENT
Start: 2022-04-28 | End: 2022-04-28 | Stop reason: HOSPADM

## 2022-04-28 RX ADMIN — ISOSORBIDE MONONITRATE 30 MG: 30 TABLET, EXTENDED RELEASE ORAL at 09:51

## 2022-04-28 RX ADMIN — METOPROLOL SUCCINATE 25 MG: 25 TABLET, EXTENDED RELEASE ORAL at 09:51

## 2022-04-28 RX ADMIN — TICAGRELOR 90 MG: 90 TABLET ORAL at 09:51

## 2022-04-28 RX ADMIN — ASPIRIN 81 MG: 81 TABLET, COATED ORAL at 09:51

## 2022-04-28 NOTE — TELEPHONE ENCOUNTER
----- Message from Tim Fuentes PA-C sent at 4/28/2022 11:58 AM EDT -----  Regarding: Hosp F/Us  Cardiology Follow-up:    Patient clinical visit in 1 week at the Cardio location: [place] office. .    Schedule visit with Cardio Helm Providers: Farzad Walker.    Type of Visit: VISIT TYPE: in-person office visit.

## 2022-04-28 NOTE — CASE MANAGEMENT
Case Management Discharge Planning Note    Patient name Alyce Aguilar III  Location /-05 MRN [id number]  : 1955 Date 2022       Current Admission Date: 2022  Current Admission Diagnosis:Other chest pain   Patient Active Problem List    Diagnosis Date Noted    Mixed hyperlipidemia 2022    Medicare annual wellness visit, initial 2022    Other chest pain 2022    Primary hypertension 2022    LOPEZ (dyspnea on exertion) 2022    Nicotine dependence 2022      LOS (days): 0  Geometric Mean LOS (GMLOS) (days):   Days to GMLOS:     OBJECTIVE:      Current admission status: Outpatient Surgery   Preferred Pharmacy:   [facility]/pharmacy #[id number]- [place] [address] [id number]  Phone: [phone] Fax: [address] [facility]  Phone: [phone] Fax: [phone]    Primary Care Provider: Carroll Santillan PA-C    Primary Insurance: [employer] REP  Secondary Insurance:     DISCHARGE DETAILS:  CM contacted [address] and spoke with [facility], [phone]  Patient's Brilinta copay cost is $58 20  CM spoke with nursing  Nursing contacted physician and prescriptions sent to [facility] pharmacy in [place]  No other CM needs noted

## 2022-04-28 NOTE — PLAN OF CARE
Problem: Potential for Falls  Goal: Patient will remain free of falls  Description: INTERVENTIONS:  - Educate patient/family on patient safety including physical limitations  - Instruct patient to call for assistance with activity   - Consult OT/PT to assist with strengthening/mobility   - Keep Call bell within reach  - Keep bed low and locked with side rails adjusted as appropriate  - Keep care items and personal belongings within reach  - Initiate and maintain comfort rounds  - Make Fall Risk Sign visible to staff  - Offer Toileting every 2 Hours, in advance of need  - Initiate/Maintain 2 a arm  - Obtain necessary fall risk management equipment: 2  - Apply yellow socks and bracelet for high fall risk patients  - Consider moving patient to room near nurses station  Outcome: Progressing     Problem: CARDIOVASCULAR - ADULT  Goal: Maintains optimal cardiac output and hemodynamic stability  Description: INTERVENTIONS:  - Monitor I/O, vital signs and rhythm  - Monitor for S/S and trends of decreased cardiac output  - Administer and titrate ordered vasoactive medications to optimize hemodynamic stability  - Assess quality of pulses, skin color and temperature  - Assess for signs of decreased coronary artery perfusion  - Instruct patient to report change in severity of symptoms  Outcome: Progressing     Problem: METABOLIC, FLUID AND ELECTROLYTES - ADULT  Goal: Electrolytes maintained within normal limits  Description: INTERVENTIONS:  - Monitor labs and assess patient for signs and symptoms of electrolyte imbalances  - Administer electrolyte replacement as ordered  - Monitor response to electrolyte replacements, including repeat lab results as appropriate  - Instruct patient on fluid and nutrition as appropriate  Outcome: Progressing  Goal: Fluid balance maintained  Description: INTERVENTIONS:  - Monitor labs   - Monitor I/O and WT  - Instruct patient on fluid and nutrition as appropriate  - Assess for signs & symptoms of volume excess or deficit  Outcome: Progressing  Goal: Glucose maintained within target range  Description: INTERVENTIONS:  - Monitor Blood Glucose as ordered  - Assess for signs and symptoms of hyperglycemia and hypoglycemia  - Administer ordered medications to maintain glucose within target range  - Assess nutritional intake and initiate nutrition service referral as needed  Outcome: Progressing

## 2022-04-28 NOTE — PROGRESS NOTES
Cardiology progress note:    Subjective:  Denies chest pain or shortness of breath  Objective:  Not in distress    Patient ambulated in hallway without any chest pain or shortness of breath  Labs including vitals reviewed  Cardiac telemetry monitoring showed sinus rhythm without any evidence of arrhythmia  General:  moderate built, awake, alert and oriented x3, not in distress  Neck: supple, no JVD  Eyes: PERRL, conjunctiva normal  Lungs:  Bilateral air entry positive, no wheeze/rhonchi or crackle  Heart:  S1-S2 normal, no murmur  Abdomen:  Soft ,nondistended ,nontender, bowel sounds positive  Extremities:  No leg edema, no deformity, ROM normal   Right radial cardiac catheterization site no bleeding or hematoma  Right distal radial artery pulse same as before angiogram  Neuro:  Moving all extremities, speech clear  Skin: warm, no rash    Assessment/plan  Coronary disease status post successful PCI of LAD on 04/27/2022    Patient to continue aspirin, Brilinta, Imdur, beta-blocker and Lipitor  Patient to follow-up in Cardiology Clinic  Patient was educated about medical compliance and importance of all above medication  Patient understands and agrees  Patient will be discharged  Above all discussed with patient and son at bedside    They understands and agrees

## 2022-04-28 NOTE — PLAN OF CARE
Problem: Potential for Falls  Goal: Patient will remain free of falls  Description: INTERVENTIONS:  - Educate patient/family on patient safety including physical limitations  - Instruct patient to call for assistance with activity   - Consult OT/PT to assist with strengthening/mobility   - Keep Call bell within reach  - Keep bed low and locked with side rails adjusted as appropriate  - Keep care items and personal belongings within reach  - Initiate and maintain comfort rounds  - Make Fall Risk Sign visible to staff  - Offer Toileting every 2 Hours, in advance of need  - Initiate/Maintain 2alarm  - Obtain necessary fall risk management equipment: 2  - Apply yellow socks and bracelet for high fall risk patients  - Consider moving patient to room near nurses station  4/28/2022 1400 by Claudy Goodman RN  Outcome: Adequate for Discharge  4/28/2022 1359 by Claudy Goodman RN  Outcome: Progressing     Problem: CARDIOVASCULAR - ADULT  Goal: Maintains optimal cardiac output and hemodynamic stability  Description: INTERVENTIONS:  - Monitor I/O, vital signs and rhythm  - Monitor for S/S and trends of decreased cardiac output  - Administer and titrate ordered vasoactive medications to optimize hemodynamic stability  - Assess quality of pulses, skin color and temperature  - Assess for signs of decreased coronary artery perfusion  - Instruct patient to report change in severity of symptoms  4/28/2022 1400 by Claudy Goodman RN  Outcome: Adequate for Discharge  4/28/2022 1359 by Claudy Goodman RN  Outcome: Progressing     Problem: METABOLIC, FLUID AND ELECTROLYTES - ADULT  Goal: Electrolytes maintained within normal limits  Description: INTERVENTIONS:  - Monitor labs and assess patient for signs and symptoms of electrolyte imbalances  - Administer electrolyte replacement as ordered  - Monitor response to electrolyte replacements, including repeat lab results as appropriate  - Instruct patient on fluid and nutrition as appropriate  4/28/2022 1400 by Sree León RN  Outcome: Adequate for Discharge  4/28/2022 1359 by Sree León RN  Outcome: Progressing  Goal: Fluid balance maintained  Description: INTERVENTIONS:  - Monitor labs   - Monitor I/O and WT  - Instruct patient on fluid and nutrition as appropriate  - Assess for signs & symptoms of volume excess or deficit  4/28/2022 1400 by Sree León RN  Outcome: Adequate for Discharge  4/28/2022 1359 by Sree León RN  Outcome: Progressing  Goal: Glucose maintained within target range  Description: INTERVENTIONS:  - Monitor Blood Glucose as ordered  - Assess for signs and symptoms of hyperglycemia and hypoglycemia  - Administer ordered medications to maintain glucose within target range  - Assess nutritional intake and initiate nutrition service referral as needed  4/28/2022 1400 by Sree León RN  Outcome: Adequate for Discharge  4/28/2022 1359 by Sree León RN  Outcome: Progressing

## 2022-04-28 NOTE — DISCHARGE INSTRUCTIONS
How to Stop Smoking   AMBULATORY CARE:   You will improve your health and the health of others around you  if you stop smoking  Your risk for heart and lung disease, cancer, stroke, heart attack, and vision problems will also decrease  Your adolescent can help prevent or stop harm to his or her brain or body  This will help him or her become a healthy adult  You can benefit from quitting no matter how long you have smoked  Prepare to stop smoking:  Nicotine is a highly addictive drug found in cigarettes  Withdrawal symptoms can happen when you stop smoking and make it hard to quit  These include anxiety, depression, irritability, trouble sleeping, and increased appetite  You increase your chances of success if you prepare to quit  · Set a quit date  Rosalie Childes a date that is within the next 2 weeks  Do not pick a day that you think may be stressful or busy  Write down the day or [place] it on your calender  · Tell friends and family that you plan to quit  Explain that you may have withdrawal symptoms when you try to quit  Ask them to support you  They may be able to encourage you and help reduce your stress to make it easier for you to quit  · Make a list of your reasons for quitting  Put the list somewhere you will see it every day, such as your refrigerator  You can look at the list when you have a craving  · Remove all tobacco and nicotine products from your home, car, and workplace  Also, remove anything else that will tempt you to smoke, such as lighters, matches, or ashtrays  Clean your car, home, and places at work that smell like smoke  The smell of smoke can trigger a craving  · Identify triggers that make you want to smoke  This may include activities, feelings, or people  Also write down 1 way you can deal with each of your triggers  For example, if you want to smoke as soon as you wake up, plan another activity during this time, such as exercise       · Make a plan for how you will quit   Learn about the tools that can help you quit, such as medicine, counseling, or nicotine replacement therapy  Choose at least 2 options to help you quit  · Help your adolescent make a plan to quit  The plan will be more successful if your adolescent makes his or her own decisions  Do not try to pressure him or her to quit immediately or in a certain way  Be supportive and offer help if needed  Tools to help you stop smoking:   · Counseling  from a trained healthcare provider can provide you with support and skills to quit smoking  The provider will also teach you to manage your withdrawal symptoms and cravings  You may receive counseling from one counselor, in group therapy, or through phone therapy called a quit line  · Nicotine replacement therapy (NRT)  such as nicotine patches, gum, or lozenges may help reduce your nicotine cravings  You may get these without a doctor's order  Do not use e-cigarettes or smokeless tobacco in place of cigarettes or to help you quit  They still contain nicotine  · Prescription medicines  such as nasal sprays or nicotine inhalers may help reduce your withdrawal symptoms  Other medicines may also be used to reduce your urge to smoke  Ask your healthcare provider about these medicines  You may need to start certain medicines 2 weeks before your quit date for them to work well  · Hypnosis  is a practice that helps guide you through thoughts and feelings  Hypnosis may help decrease your cravings and make you more willing to quit  · Acupuncture therapy  uses very thin needles to balance energy channels in the body  This is thought to help decrease cravings and symptoms of nicotine withdrawal     · Support groups  let you talk to others who are trying to quit or have already quit  It may be helpful to speak with others about how they quit  Manage your cravings:   · Avoid situations, people, and places that tempt you to smoke    Go to nonsmoking places, such as libraries or restaurants  Understand what tempts you and try to avoid these things  · Keep your hands busy  Hold things such as a stress ball or pen  · Put candy or toothpicks in your mouth  Keep lollipops, sugarless gum, or toothpicks with you at all times  · Do not have alcohol or caffeine  These drinks may tempt you to smoke  Drink healthy liquids such as water or juice instead  · Reward yourself when you resist your cravings  Rewards will motivate you and help you stay positive  · Do an activity that distracts you from your craving  Examples include cleaning, creating art, or gardening  Prevent weight gain after you quit:  You may gain a few pounds after you quit smoking  It is healthier for you to gain a few pounds than to continue to smoke  The following can help you prevent weight gain:  · Eat a variety of healthy foods  Healthy foods include fruits, vegetables, whole-grain breads, low-fat dairy products, beans, lean meats, and fish  Eat healthy snacks, such as low-fat yogurt, if you get hungry between meals  · Drink water before, during, and between meals  This will make your stomach feel full and help prevent you from overeating  Ask your healthcare provider how much liquid to drink each day and which liquids are best for you  · Be physically active  Activity may help reduce your cravings and reduce stress  Take a walk or do some kind of physical activity every day  Ask your healthcare provider which activities are right for you  For support and more information:   · American Lung Association  [address] [facility]  [address]  Phone: [facility]  Phone: 9- 195 - 341-8152  Web Address: LoveThis    · Smokefree  gov  Phone: 0- 172 - 194-4964  Web Address: www smokefree    [address] 2022 Information is for End User's use only and may not be sold, redistributed or otherwise used for commercial purposes   All illustrations and images included in CareNotes® are the copyrighted property of [employer]  or Lisbet Reagan   The above information is an  only  It is not intended as medical advice for individual conditions or treatments  Talk to your doctor, nurse or pharmacist before following any medical regimen to see if it is safe and effective for you  Cigarette Smoking and Your Health   AMBULATORY CARE:   Risks to your health if you smoke:  Nicotine and other chemicals found in tobacco and e-cigarettes can damage every cell in your body  Even if you are a light smoker, you have an increased risk for cancer, heart disease, and lung disease  If you are pregnant or have diabetes, smoking increases your risk for complications  Nicotine can affect an adolescent's developing brain  This can lead to trouble thinking, learning, or paying attention  Benefits to your health if you stop smoking:   · You decrease respiratory symptoms such as coughing, wheezing, and shortness of breath  · You reduce your risk for cancers of the lung, mouth, throat, kidney, bladder, pancreas, stomach, and cervix  If you already have cancer, you increase the benefits of chemotherapy  You also reduce your risk for cancer returning or a second cancer from developing  · You reduce your risk for heart disease, blood clots, heart attack, and stroke  · You reduce your risk for lung infections, and diseases such as pneumonia, asthma, chronic bronchitis, and emphysema  · Your circulation improves  More oxygen can be delivered to your body  If you have diabetes, you lower your risk for complications, such as kidney, artery, and eye diseases  You also lower your risk for nerve damage  Nerve damage can lead to amputations, poor vision, and blindness  · You improve your body's ability to heal and to fight infections  · An adolescent can help his or her brain and body develop in a healthy way   Talk to your adolescent about all the health risks of nicotine  If you can, start talking about nicotine when your child is younger than 12 years  This may make it easier for him or her not to start using nicotine as a teenager or adult  Explain to him or her that it is best never to start  It can be hard to try to quit later  Benefits to the health of others if you stop smoking:  Tobacco is harmful to nonsmokers who breathe in your secondhand smoke  The following are ways the health of others around you may improve when you stop smoking:  · You lower the risks for lung cancer and heart disease in nonsmoking adults  · If you are pregnant, you lower the risk for miscarriage, early delivery, low birth weight, and stillbirth  You also lower your baby's risk for SIDS, obesity, developmental delay, and neurobehavioral problems, such as ADHD  · If you have children, you lower their risk for ear infections, colds, pneumonia, bronchitis, and asthma  Follow up with your doctor as directed:  Write down your questions so you remember to ask them during your visits  For support and more information:   · American Lung Association  [address] [place] [address],[address]  [address]  Phone: [facility] [address]277  Phone: 3- 435 - 072-3743  Web Address: [employer]    · Smokefree  gov  Phone: 0- 144 - 298-2991  Web Address: www smokefree  [facility] 21 2022 Information is for End User's use only and may not be sold, redistributed or otherwise used for commercial purposes  All illustrations and images included in CareNotes® are the copyrighted property of [employer]  or [address] [place] [address]  The above information is an  only  It is not intended as medical advice for individual conditions or treatments  Talk to your doctor, nurse or pharmacist before following any medical regimen to see if it is safe and effective for you

## 2022-04-29 DIAGNOSIS — R07.89 OTHER CHEST PAIN: ICD-10-CM

## 2022-04-29 DIAGNOSIS — R06.09 DOE (DYSPNEA ON EXERTION): ICD-10-CM

## 2022-04-29 DIAGNOSIS — I10 PRIMARY HYPERTENSION: ICD-10-CM

## 2022-04-29 DIAGNOSIS — R07.2 PRECORDIAL CHEST PAIN: ICD-10-CM

## 2022-04-29 RX ORDER — METOPROLOL SUCCINATE 25 MG/1
25 TABLET, EXTENDED RELEASE ORAL DAILY
Qty: 30 TABLET | Refills: 0 | Status: SHIPPED | OUTPATIENT
Start: 2022-04-29 | End: 2022-05-25 | Stop reason: SDUPTHER

## 2022-04-29 RX ORDER — ASPIRIN 81 MG/1
81 TABLET ORAL DAILY
Qty: 30 TABLET | Refills: 2 | Status: SHIPPED | OUTPATIENT
Start: 2022-04-29 | End: 2022-05-25 | Stop reason: SDUPTHER

## 2022-04-29 RX ORDER — ISOSORBIDE MONONITRATE 30 MG/1
30 TABLET, EXTENDED RELEASE ORAL DAILY
Qty: 30 TABLET | Refills: 3 | Status: SHIPPED | OUTPATIENT
Start: 2022-04-29 | End: 2022-05-25 | Stop reason: SDUPTHER

## 2022-04-29 NOTE — TELEPHONE ENCOUNTER
Patient Carline Enter contacting office for medication refill on the metoprolol, isosorbide and the ASA  For a 90 day supply to be sent through [facility]  Patient scheduled to see CANDY Fuentes on 5/3/22 @ [facility] for hsp f/u

## 2022-05-03 ENCOUNTER — OFFICE VISIT (OUTPATIENT)
Dept: CARDIOLOGY CLINIC | Facility: CLINIC | Age: 67
End: 2022-05-03
Payer: COMMERCIAL

## 2022-05-03 VITALS
WEIGHT: 161 LBS | HEART RATE: 84 BPM | OXYGEN SATURATION: 97 % | BODY MASS INDEX: 24.4 KG/M2 | HEIGHT: 68 IN | RESPIRATION RATE: 16 BRPM | DIASTOLIC BLOOD PRESSURE: 78 MMHG | SYSTOLIC BLOOD PRESSURE: 136 MMHG

## 2022-05-03 DIAGNOSIS — E78.2 MIXED HYPERLIPIDEMIA: ICD-10-CM

## 2022-05-03 DIAGNOSIS — I10 PRIMARY HYPERTENSION: ICD-10-CM

## 2022-05-03 DIAGNOSIS — I25.10 CORONARY ARTERY DISEASE INVOLVING NATIVE CORONARY ARTERY OF NATIVE HEART WITHOUT ANGINA PECTORIS: Primary | ICD-10-CM

## 2022-05-03 PROCEDURE — 1160F RVW MEDS BY RX/DR IN RCRD: CPT | Performed by: PHYSICIAN ASSISTANT

## 2022-05-03 PROCEDURE — 3078F DIAST BP <80 MM HG: CPT | Performed by: PHYSICIAN ASSISTANT

## 2022-05-03 PROCEDURE — 3075F SYST BP GE 130 - 139MM HG: CPT | Performed by: PHYSICIAN ASSISTANT

## 2022-05-03 PROCEDURE — 3008F BODY MASS INDEX DOCD: CPT | Performed by: PHYSICIAN ASSISTANT

## 2022-05-03 PROCEDURE — 1036F TOBACCO NON-USER: CPT | Performed by: PHYSICIAN ASSISTANT

## 2022-05-03 PROCEDURE — 99213 OFFICE O/P EST LOW 20 MIN: CPT | Performed by: PHYSICIAN ASSISTANT

## 2022-05-03 RX ORDER — NITROGLYCERIN 0.4 MG/1
0.4 TABLET SUBLINGUAL
Qty: 100 TABLET | Refills: 0 | Status: SHIPPED | OUTPATIENT
Start: 2022-05-03 | End: 2022-07-26 | Stop reason: SDUPTHER

## 2022-05-03 NOTE — PROGRESS NOTES
Cardiology Outpatient Follow up Note    Todd Hong III 74 y o  male MRN: [id number]    05/03/22          Assessment:  1  CAD s/p JOSÉ LUIS x1 to mLAD  2  Hypertension  3  Hyperlipidemia  4  Former tobacco use  5  GERD    Plan:  Referral placed for cardiopulmonary rehab  Continue DAPT with ASA and Brilinta for at least 1 year post PCI  Continue Toprol XL 25mg and Imdur 30mg daily  Ordered for SL NTG PRN  Patient informed he does have residual borderline OM1 disease, we will consider staged PCI if patient becomes symptomatic  RTO in 2 months or sooner if needed    Diagnostics:   Last TTE 4/8/22:    Left Ventricle: Left ventricular cavity size is normal  Wall thickness is normal  The left ventricular ejection fraction is 60%  Systolic function is normal  Wall motion is normal  Diastolic function is mildly abnormal, consistent with grade I (abnormal) relaxation    Right Ventricle: Right ventricular cavity size is normal  Systolic function is normal     Cardiac catheterization 04/27/2022:  Mid LAD 99 % stenosis status post successful PCI with drug-eluting stent x1  Proximal LAD 40 percent stenosis  Proximal circumflex discrete 40 percent stenosis  OM1 is medium to large size long vessel with 80% percent stenosis in mid  Proximal RCA discrete 40 percent stenosis    1  Coronary artery disease involving native coronary artery of native heart without angina pectoris  Ambulatory  Referral to Cardiac Rehabilitation    nitroglycerin (NITROSTAT) 0 4 mg SL tablet   2  Primary hypertension     3  Mixed hyperlipidemia         HPI: Marcus Whelan is a 74y o  year old male with past medical history as above who presents for hospital follow-up visit  Patient is known to cardiologist Dr Felix Cordero  Patient was seen in cardiology clinic on 04/05/2022 with precordial chest pain/burning sensation  He was worked up with TTE and pharmacologic MPI    Pharmacologic MPI did show reversible ischemia and was scheduled for elective cardiac catheterization which showed 99 percent mid LAD stenosis receiving JOSÉ LUIS x1 with good residual result  Patient does have 80%OM1 lesion which is being medically managed  Post catheterization, patient denies any cardiac symptoms of concern to include chest pain, tightness, heaviness, shortness of breath, orthopnea, PND lower extremity edema  He denies any exertional limitations at this point  He is interested in starting cardiopulmonary rehab at this time  He reports he is taking aspirin and Brilinta faithfully  Denies any bleeding episodes      Patient Active Problem List   Diagnosis    Other chest pain    Primary hypertension    LOPEZ (dyspnea on exertion)    Nicotine dependence    Mixed hyperlipidemia    Medicare annual wellness visit, initial       No Known Allergies      Current Outpatient Medications:     aspirin (ECOTRIN LOW STRENGTH) 81 mg EC tablet, Take 1 tablet (81 mg total) by mouth daily, Disp: 30 tablet, Rfl: 2    atorvastatin (LIPITOR) 80 mg tablet, Take 1 tablet (80 mg total) by mouth daily with dinner, Disp: 90 tablet, Rfl: 3    isosorbide mononitrate (IMDUR) 30 mg 24 hr tablet, Take 1 tablet (30 mg total) by mouth daily, Disp: 30 tablet, Rfl: 3    metoprolol succinate (TOPROL-XL) 25 mg 24 hr tablet, Take 1 tablet (25 mg total) by mouth daily, Disp: 30 tablet, Rfl: 0    ticagrelor (Brilinta) 90 MG, Take 1 tablet (90 mg total) by mouth every 12 (twelve) hours, Disp: 60 tablet, Rfl: 0    nitroglycerin (NITROSTAT) 0 4 mg SL tablet, Place 1 tablet (0 4 mg total) under the tongue every 5 (five) minutes as needed for chest pain, Disp: 100 tablet, Rfl: 0    Past Medical History:   Diagnosis Date    GERD (gastroesophageal reflux disease)     Hyperlipidemia     Hypertension        Family History   Problem Relation Age of Onset    Cancer Mother     Heart attack Father         passed at 64    Cancer Sister        Past Surgical History:   Procedure Laterality Date    CARDIAC CATHETERIZATION N/A 4/27/2022    Procedure: Cardiac catheterization;  Surgeon: Donnie Vasquez MD;  Location: [facility] CATH LAB; Service: Cardiology    CARDIAC CATHETERIZATION Left 4/27/2022    Procedure: Cardiac Left Heart Cath;  Surgeon: Donnie Vasquez MD;  Location: [address] [place] [address] CATH LAB; Service: Cardiology    HERNIA REPAIR         Social History     Socioeconomic History    Marital status: Single     Spouse name: Not on file    Number of children: Not on file    Years of education: Not on file    Highest education level: Not on file   Occupational History    Not on file   Tobacco Use    Smoking status: Former Smoker     Quit date: 2012     Years since quitting: 10 3    Smokeless tobacco: Never Used   Vaping Use    Vaping Use: Every day    Substances: Nicotine   Substance and Sexual Activity    Alcohol use: Not Currently     Comment: recovering alcoholic    Drug use: Yes     Types: Marijuana     Comment: medical marijuana card    Sexual activity: Not Currently   Other Topics Concern    Not on file   Social History Narrative    Not on file     Social Determinants of Health     Financial Resource Strain: Not on file   Food Insecurity: Not on file   Transportation Needs: Not on file   Physical Activity: Not on file   Stress: Not on file   Social Connections: Not on file   Intimate Partner Violence: Not on file   Housing Stability: Not on file       Review of Systems   Constitutional: Negative for chills, decreased appetite, diaphoresis, fever, malaise/fatigue, night sweats, weight gain and weight loss  HENT: Negative for nosebleeds, odynophagia and sore throat  Eyes: Negative for double vision, photophobia and visual disturbance  Cardiovascular: Negative for chest pain, dyspnea on exertion, irregular heartbeat, leg swelling, near-syncope, orthopnea, palpitations, paroxysmal nocturnal dyspnea and syncope  Hematologic/Lymphatic: Negative for bleeding problem     Musculoskeletal: Negative for muscle cramps, muscle weakness and myalgias  Gastrointestinal: Negative for bloating, abdominal pain, diarrhea, dysphagia, hematemesis, hematochezia, nausea and vomiting  Genitourinary: Negative for frequency and hematuria  Neurological: Negative for headaches, light-headedness, loss of balance, paresthesias and vertigo  Vitals: /78 (BP Location: Left arm, Patient Position: Sitting, Cuff Size: Standard)   Pulse 84 Comment: reg  Resp 16   Ht 5' 8" (1 727 m)   Wt 73 kg (161 lb)   SpO2 97%   BMI 24 48 kg/m²     Physical Exam:   GEN: Alert and oriented x 3, in no acute distress  Well appearing and well nourished  HEENT: Sclera anicteric, conjunctivae pink, mucous membranes moist  Oropharynx clear  NECK: Supple, no carotid bruits, no significant JVD  Trachea midline, no thyromegaly  HEART: Regular rhythm, normal S1 and S2, no murmurs, clicks, gallops or rubs  PMI nondisplaced, no thrills  LUNGS: Clear to auscultation bilaterally; no wheezes, rales, or rhonchi  No increased work of breathing or signs of respiratory distress  ABDOMEN: Soft, nontender, nondistended, normoactive bowel sounds  EXTREMITIES: Skin warm and well perfused, no clubbing, cyanosis, or edema  NEURO: No focal findings  Normal speech  Mood and affect normal    SKIN: Normal without suspicious lesions on exposed skin      Lab Results:   No results found for: HGBA1C  No results found for: CHOL  Lab Results   Component Value Date    HDL 37 (L) 03/23/2022     Lab Results   Component Value Date    LDLCALC 118 (H) 03/23/2022     Lab Results   Component Value Date    TRIG 132 03/23/2022

## 2022-05-05 ENCOUNTER — TELEPHONE (OUTPATIENT)
Dept: CARDIOLOGY CLINIC | Facility: CLINIC | Age: 67
End: 2022-05-05

## 2022-05-05 NOTE — TELEPHONE ENCOUNTER
Batsheva Bermudez from [facility] [phone] contacting office requesting additional information regarding this patient's insurance and telephone information

## 2022-05-05 NOTE — TELEPHONE ENCOUNTER
Spoke to [place] from cardiac rehab, she request last OV with Dr Renate Branch, EKG, cath report and insurance info  Faxed confirmation received

## 2022-05-17 ENCOUNTER — CLINICAL SUPPORT (OUTPATIENT)
Dept: CARDIAC REHAB | Facility: HOSPITAL | Age: 67
End: 2022-05-17
Payer: COMMERCIAL

## 2022-05-17 DIAGNOSIS — R07.89 OTHER CHEST PAIN: ICD-10-CM

## 2022-05-17 DIAGNOSIS — I25.10 CAD (CORONARY ARTERY DISEASE): ICD-10-CM

## 2022-05-17 DIAGNOSIS — Z95.5 STENTED CORONARY ARTERY: Primary | ICD-10-CM

## 2022-05-17 DIAGNOSIS — I25.10 CORONARY ARTERY DISEASE INVOLVING NATIVE CORONARY ARTERY OF NATIVE HEART WITHOUT ANGINA PECTORIS: ICD-10-CM

## 2022-05-17 PROCEDURE — 93798 PHYS/QHP OP CAR RHAB W/ECG: CPT

## 2022-05-17 RX ORDER — ATORVASTATIN CALCIUM 80 MG/1
80 TABLET, FILM COATED ORAL
Qty: 90 TABLET | Refills: 3 | Status: SHIPPED | OUTPATIENT
Start: 2022-05-17

## 2022-05-17 NOTE — PROGRESS NOTES
Cardiac Rehabilitation Plan of Care   Initial Care Plan          Today's date: 2022   # of Exercise Sessions Completed:  -  Patient had one visit at [facility]  Patient name: Oma Ragsdale      : 1955  Age: 74 y o  MRN: [id number]  Referring Physician: Gabriela Bal MD  Cardiologist: Gabriela Bal MD  Provider: Eliana Platt  Clinician: Maia Patiño, MPT, CCRP    Dx:   Encounter Diagnosis   Name Primary?  Coronary artery disease involving native coronary artery of native heart without angina pectoris      Date of onset: 2022      SUMMARY OF PROGRESS:  Today is the patient's initial visit at Cardiac Rehab  Patient admits to 100% medication compliance  Patient reports the following physical limitations: occasional LOPEZ, fatigue and general functional decline  The patient completed an initial Submaximal TM ETT for a total of 7 minutes and reached a 4 3 MET level  Patient does not require supplemental O2 at this time, and does not require 02/CPAP to sleep  Patient's PHQ9 Score was two  At this time, patient denies having depression  Patient's GAD7 Score was two  At this time, patient admits to having anxiety  Currently, the patient does follow a formal exercise program at home, including walking outdoors weather permitting  Patient was counseled on exercise guidelines including 1-2 days of moderate exercise on opposite days of Cardiac Rehab, as tolerated  Patient was issued the [facility] Cardiopulmonary Guide this session  At rest, the patient's HR was 75, O2Sat was 99%, and BP was 132/60  During exercise, the patient's peak HR was 106, while peak BP was 148/62  During the test he was able to maintain his 02 Sat at 98%  Patient rated the test a 4 on a 1-10 RPE scale  During recovery, the patient's HR was 83, O2Sat was 98%, and BP was 118/58  Patient denied having any other symptoms during exercise  Additionally, the patient's telemetry revealed NSR   Patient had correct hemodynamic responses to activity  Patient has goals of upgrade HEP, decrease LOPEZ, decrease fatigue, decrease reliance on medication, improve diet, decrease stress level, decrease risk profile and attain his maximal level of function        Patient is agreeable to coming to Cardiac Rehab 2-3X times/week for 12 weeks or until he reaches 36 sessions  Patient is scheduled to begin CR  2022        Medication compliance: Yes   Comments: Pt reports to be compliant with medications  Fall Risk: Low   Comments: Ambulates with a steady gait with no assist device    EKG Interpretation: NSR      EXERCISE ASSESSMENT and PLAN    Current Exercise Program in Rehab:       Frequency: 2-3 days/week Supplement with home exercise 2+ days/wk as tolerated       Minutes: 30-35        METS: 4 3            HR: 20-30 > RHR   RPE: 4-6        Modalities: Treadmill, UBE, NuStep and Recumbent bike      Exercise Progression 30 Day Goals :    Frequency: 2-3 days/week of cardiac rehab     Supplement with home exercise 2+ days/wk as tolerated    Minutes: 35-45                           >150 mins/wk of moderate intensity exercise   METS: 4 to 5   HR: 20-30 > RHR    RPE: 4-6   Modalities: Treadmill, Airdyne bike, UBE, Lifecycle, NuStep and Recumbent bike    Strength trainin-3 days / week  12-15 repetitions  1-2 sets per modality    Modalities: Leg Press and UE Free Weights    Home Exercise: Outdoor walking     Goals: 10% improvement in functional capacity - based on max METs achieved in fitness assessment, Reduced dyspnea with physical activity  0-110, Increase in exercise capacity by 40% - based on peak METs tolerated in cardiac rehab exercise session, Exercise 5 days/wk, >150mins/wk of moderate intensity exercise, Resume ADLs with increased strength, Attend Rehab regularly and start a home exercise program    Progression Toward Goals:  Reviewed Pt goals and determined plan of care    Education: benefit of exercise for CAD risk factors, home exercise guidelines, AHA guidelines to achieve >150 mins/wk of moderate exercise, RPE scale and class: Risk Factors for Heart Disease   Plan:education on home exercise guidelines, home exercise 30+ mins 2 days opposite CR and Education class: Risk Factors for Heart Disease  Readiness to change: Preparation:  (Getting ready to change)       NUTRITION ASSESSMENT AND PLAN    Weight control:    Starting weight: 161   Current weight:   161  Waist circumference:    Starting: NA   Current:  NA    Diabetes: N/A  A1c: NA    last measured: NA    Lipid management: Last lipid profile 3/23/2022  Chol 181    HDL 37      Goals:LDL <100, HDL >40, TRG <150, CHOL <200, Improved Rate Your Plate score  >68, reduce portion sizes of meat to 3oz or less, increase intake of fish, shellfish, cook without added fat or use vegetable oil/spray, eat 3 or more servings of whole grains a day, Eat 4-5 cups of fruits and vegetables daily, use olive or canola oil in baking, eliminate butter, daily saturated fat intake <7%/13g and seldom eat out or choose lower fat menu items    Progression Toward Goals: Reviewed Pt goals and determined plan of care    Education: heart healthy eating  low sodium diet  hydration  nutrition for  lipid management  education class: Heart Healthy Eating  education class:  Label Reading  Plan: Education class: Reading Food Labels, Education Class: Heart Healthy Eating, replace butter with soft spreads made with olive oil, canola or yogurt, replace refined grain bread with whole grain bread, replace unhealthy snacks with fruits & vegs, eat fewer desserts and sweets, avoid processed foods, remove salt shaker from table, drink more water and learn how to read food labels  Readiness to change: Preparation:  (Getting ready to change)       PSYCHOSOCIAL ASSESSMENT AND PLAN    Emotional:  Depression assessment:  PHQ-9 = 1-4 = Minimal Depression            Anxiety measure:  MARISSA-7 = 0-4  = Not anxious  Self-reported stress level:  6/10  Social support: Excellent    Goals:  Reduce perceived stress to 1-3/10, improved [facility] QOL < 27, Physical Fitness in [facility] Score < 3, Social Support in [facility] Score < 3, Daily Activity in [facility] Score < 3, Social Activities in [facility] Score < 3, Increased interest in doing things and improved sleep    Progression Toward Goals: Reviewed Pt goals and determined plan of care    Education: signs/sxs of depression, benefits of a positive support system, class:  Stress and Your Health  and class:  Relaxation  Plan: Class: Stress and Your Health, Class: Relaxation, Practice relaxation techniques, Exercise and Spend time outdoors  Readiness to change: Preparation:  (Getting ready to change)       OTHER CORE COMPONENTS     Tobacco:   Social History     Tobacco Use   Smoking Status Former Smoker    Quit date:     Years since quitting: 10 3   Smokeless Tobacco Never Used       Tobacco Use Intervention:   N/A: Pt has a remote history of smoking quit     Anginal Symptoms:  chest pressure and LOPEZ at time of the event   NTG use: Compliant with carrying NTG, Understands proper use and Pt has not used NTG since event    Blood pressure:    Restin/60   Exercise: 148/62   Recovery: 118/58    Goals: consistent BP < 130/80, reduced dietary sodium <2300mg, moderate intensity exercise >150 mins/wk and medication compliance    Progression Toward Goals: Reviewed Pt goals and determined plan of care    Education:  understanding high blood pressure and it's relationship to CAD, low sodium diet and HTN, proper use of sublingual NTG, Education class:  Common Heart Medications and Education class: Understanding Heart Disease  Plan: Class: Understanding Heart Disease, Class: Common Heart Medications, avoid places with second hand smoke, Avoid Processed foods, engage in regular exercise, eliminate salt shaker at the table and monitor home BP  Readiness to change: Preparation:  (Getting ready to change)             CARDIAC REHAB ASSESSMENT    Today's date: May 17, 2022  Patient name: Heron Diallo     : 1955       MRN: [id number]  PCP: Karyn Jackson PA-C  Referring Physician: Eloisa Blake MD  Cardiologist: Eloisa Blake MD  Surgeon: Dr Lesly Mg MD   Dx:   Encounter Diagnosis   Name Primary?  Coronary artery disease involving native coronary artery of native heart without angina pectoris        Date of onset: 2022  Cultural needs: None    Weight    Wt Readings from Last 1 Encounters:   22 73 kg (161 lb)      Height:   Ht Readings from Last 1 Encounters:   22 5' 8" (1 727 m)     Medical History:   Past Medical History:   Diagnosis Date    GERD (gastroesophageal reflux disease)     Hyperlipidemia     Hypertension          Physical Limitations: None    Fall Risk: Low   Comments: Ambulates with a steady gait with no assist device    Anginal Equivalent: Chest Pressure and Dyspnea  At time of the event   NTG use: Compliant with carrying NTG, Understands proper use and Pt has not used NTG since event    Risk Factors   Cholesterol: Yes  Smoking: Former user  HTN: Yes  DM: No  Obesity: No   Inactivity: No  Stress:  perceived  stress: 7/10   Stressors: Current health issues and becoming a    Goals for Stress Management:Exercise, Spend time outside and Enjoy a hobby    Family History:  Family History   Problem Relation Age of Onset    Cancer Mother     Heart attack Father         passed at 64    Cancer Sister        Allergies: Patient has no known allergies    ETOH:   Social History     Substance and Sexual Activity   Alcohol Use Not Currently    Comment: recovering alcoholic         Current Medications:   Current Outpatient Medications   Medication Sig Dispense Refill    aspirin (ECOTRIN LOW STRENGTH) 81 mg EC tablet Take 1 tablet (81 mg total) by mouth daily 30 tablet 2    atorvastatin (LIPITOR) 80 mg tablet Take 1 tablet (80 mg total) by mouth daily with dinner 90 tablet 3    isosorbide mononitrate (IMDUR) 30 mg 24 hr tablet Take 1 tablet (30 mg total) by mouth daily 30 tablet 3    metoprolol succinate (TOPROL-XL) 25 mg 24 hr tablet Take 1 tablet (25 mg total) by mouth daily 30 tablet 0    nitroglycerin (NITROSTAT) 0 4 mg SL tablet Place 1 tablet (0 4 mg total) under the tongue every 5 (five) minutes as needed for chest pain 100 tablet 0    ticagrelor (Brilinta) 90 MG Take 1 tablet (90 mg total) by mouth every 12 (twelve) hours 60 tablet 0     No current facility-administered medications for this visit  Functional Status Prior to Diagnosis for Treatment   Occupation: retired -IT  Recreation: Fishing, outdoor activities, 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  ADLs: No limitations  [place]: No limitations  Exercise: Daily walking  Other:     Current Functional Status  Occupation: retired  Recreation: as above  ADLs:able to perform self-care resumed driving  [place]: resumed all ADLs  Exercise: Agreeable to attend CR  Other:     Patient Specific Goals:  Patient has goals of upgrade HEP, decrease LOPEZ, decrease fatigue, decrease reliance on medication, improve diet, decrease stress level, decrease risk profile and attain his maximal level of function          Short Term Program Goals: dietary modifications increased strength improved energy/stamina with ADLs exercise 120-150 mins/wk    Long Term Goals: increased maximal walking duration  increased intial training workload  Improved [facility] Activity Status score  Improved Quality of Life - [facility] score reduced  Improved lipid profile  Reduced stress    Ability to reach goals/rehabilitation potential:  Excellent    Projected return to function: 12 weeks  Objective tests: sub-max TM ETT      Nutritional   Reviewed details of Rate your Plate  Discussed key elements of heart healthy eating  Reviewed patient goals for dietary modifications and their clinical implications    Reviewed most recent lipid profile  Goals for dietary modification: choose lean cuts of meat  low fat ground meat and poultry  eliminate processed meats  reduce portions of meat to 3 oz  increase fish intake  more meatless meals  low fat dairy   increase whole grains  increase fruits and vegetables  eliminate butter  low sodium      Emotional/Social  Patient reports feelings of anxiety    Marital status: single/    Domestic Violence Screening: No    Comments: Patient requires skilled CR interventions in order to attain his goals and maximal level of function  CR is medically necessary for secondary prevention

## 2022-05-23 ENCOUNTER — CLINICAL SUPPORT (OUTPATIENT)
Dept: CARDIAC REHAB | Facility: HOSPITAL | Age: 67
End: 2022-05-23
Payer: COMMERCIAL

## 2022-05-23 DIAGNOSIS — Z95.5 STENTED CORONARY ARTERY: ICD-10-CM

## 2022-05-23 PROCEDURE — 93798 PHYS/QHP OP CAR RHAB W/ECG: CPT

## 2022-05-25 ENCOUNTER — CLINICAL SUPPORT (OUTPATIENT)
Dept: CARDIAC REHAB | Facility: HOSPITAL | Age: 67
End: 2022-05-25
Payer: COMMERCIAL

## 2022-05-25 DIAGNOSIS — R07.2 PRECORDIAL CHEST PAIN: ICD-10-CM

## 2022-05-25 DIAGNOSIS — R06.00 DOE (DYSPNEA ON EXERTION): ICD-10-CM

## 2022-05-25 DIAGNOSIS — I10 PRIMARY HYPERTENSION: ICD-10-CM

## 2022-05-25 DIAGNOSIS — R07.89 OTHER CHEST PAIN: ICD-10-CM

## 2022-05-25 DIAGNOSIS — Z95.5 STENTED CORONARY ARTERY: ICD-10-CM

## 2022-05-25 PROCEDURE — 93798 PHYS/QHP OP CAR RHAB W/ECG: CPT

## 2022-05-25 RX ORDER — METOPROLOL SUCCINATE 25 MG/1
25 TABLET, EXTENDED RELEASE ORAL DAILY
Qty: 30 TABLET | Refills: 0 | Status: SHIPPED | OUTPATIENT
Start: 2022-05-25 | End: 2022-07-01

## 2022-05-25 RX ORDER — ASPIRIN 81 MG/1
81 TABLET ORAL DAILY
Qty: 30 TABLET | Refills: 2 | Status: SHIPPED | OUTPATIENT
Start: 2022-05-25 | End: 2022-08-23

## 2022-05-25 RX ORDER — ISOSORBIDE MONONITRATE 30 MG/1
30 TABLET, EXTENDED RELEASE ORAL DAILY
Qty: 30 TABLET | Refills: 3 | Status: SHIPPED | OUTPATIENT
Start: 2022-05-25

## 2022-05-30 ENCOUNTER — APPOINTMENT (OUTPATIENT)
Dept: CARDIAC REHAB | Facility: HOSPITAL | Age: 67
End: 2022-05-30
Payer: COMMERCIAL

## 2022-05-31 ENCOUNTER — CLINICAL SUPPORT (OUTPATIENT)
Dept: CARDIAC REHAB | Facility: HOSPITAL | Age: 67
End: 2022-05-31
Payer: COMMERCIAL

## 2022-05-31 ENCOUNTER — HOSPITAL ENCOUNTER (OUTPATIENT)
Dept: CT IMAGING | Facility: CLINIC | Age: 67
Discharge: HOME | End: 2022-05-31
Payer: COMMERCIAL

## 2022-05-31 DIAGNOSIS — F17.290 OTHER TOBACCO PRODUCT NICOTINE DEPENDENCE, UNCOMPLICATED: ICD-10-CM

## 2022-05-31 PROCEDURE — 71271 CT THORAX LUNG CANCER SCR C-: CPT

## 2022-06-01 ENCOUNTER — CLINICAL SUPPORT (OUTPATIENT)
Dept: CARDIAC REHAB | Facility: HOSPITAL | Age: 67
End: 2022-06-01
Payer: COMMERCIAL

## 2022-06-01 DIAGNOSIS — Z95.5 STENTED CORONARY ARTERY: ICD-10-CM

## 2022-06-01 PROCEDURE — 93798 PHYS/QHP OP CAR RHAB W/ECG: CPT

## 2022-06-03 ENCOUNTER — CLINICAL SUPPORT (OUTPATIENT)
Dept: CARDIAC REHAB | Facility: HOSPITAL | Age: 67
End: 2022-06-03
Payer: COMMERCIAL

## 2022-06-03 DIAGNOSIS — Z95.5 STENTED CORONARY ARTERY: ICD-10-CM

## 2022-06-03 PROCEDURE — 93798 PHYS/QHP OP CAR RHAB W/ECG: CPT

## 2022-06-06 ENCOUNTER — CLINICAL SUPPORT (OUTPATIENT)
Dept: CARDIAC REHAB | Facility: HOSPITAL | Age: 67
End: 2022-06-06
Payer: COMMERCIAL

## 2022-06-06 DIAGNOSIS — Z95.5 STENTED CORONARY ARTERY: ICD-10-CM

## 2022-06-06 PROCEDURE — 93798 PHYS/QHP OP CAR RHAB W/ECG: CPT

## 2022-06-08 ENCOUNTER — CLINICAL SUPPORT (OUTPATIENT)
Dept: CARDIAC REHAB | Facility: HOSPITAL | Age: 67
End: 2022-06-08
Payer: COMMERCIAL

## 2022-06-08 DIAGNOSIS — Z95.5 STENTED CORONARY ARTERY: ICD-10-CM

## 2022-06-08 PROCEDURE — 93798 PHYS/QHP OP CAR RHAB W/ECG: CPT

## 2022-06-13 ENCOUNTER — CLINICAL SUPPORT (OUTPATIENT)
Dept: CARDIAC REHAB | Facility: HOSPITAL | Age: 67
End: 2022-06-13
Payer: COMMERCIAL

## 2022-06-13 DIAGNOSIS — Z95.5 STENTED CORONARY ARTERY: Primary | ICD-10-CM

## 2022-06-13 PROCEDURE — 93798 PHYS/QHP OP CAR RHAB W/ECG: CPT

## 2022-06-14 DIAGNOSIS — I25.10 ATHEROSCLEROSIS OF NATIVE CORONARY ARTERY OF NATIVE HEART WITHOUT ANGINA PECTORIS: ICD-10-CM

## 2022-06-14 DIAGNOSIS — I25.10 ATHEROSCLEROSIS OF NATIVE CORONARY ARTERY OF NATIVE HEART WITHOUT ANGINA PECTORIS: Primary | ICD-10-CM

## 2022-06-14 DIAGNOSIS — I25.10 CAD (CORONARY ARTERY DISEASE): ICD-10-CM

## 2022-06-14 RX ORDER — TICAGRELOR 90 MG/1
TABLET ORAL
Qty: 60 TABLET | Refills: 11 | Status: SHIPPED | OUTPATIENT
Start: 2022-06-14 | End: 2022-06-14

## 2022-06-14 NOTE — PROGRESS NOTES
Cardiac Rehabilitation Plan of Care   30 Day Reassessment          Today's date: 2022   # of Exercise Sessions Completed:  -  Patient had one visit at [facility]  Patient name: Desmond Macdonald      : 1955  Age: 74 y o  MRN: [id number]  Referring Physician: Aaron Almazan PT  Cardiologist: Liudmila Ferris MD  Provider: Tae russo  Clinician: Rossie Kaufman Colletta Ricks, MPT, CCRP    Dx:   Encounter Diagnosis   Name Primary?  Stented coronary artery    [facility] to mid LAD    Date of onset: 2022      SUMMARY OF PROGRESS:  Concetta Huerta has complete nine exercise sessions at Cardiac Rehab s/p [facility]  To mid LAD  Patient admits to 100% medication compliance  Patient reports the following physical limitations: occasional LOPEZ, fatigue and general functional decline  The patient is able to work at a 3 78 MET level  Patient does not require supplemental O2 at this time, and does not require 02/CPAP to sleep  Patient's PHQ9 Score was two  At this time, patient denies having depression  Patient's GAD7 Score was two  At this time, patient admits to having anxiety  Currently, the patient does follow a formal exercise program at home, including walking outdoors weather permitting  Patient notes activity is limited by back issues at this time  Patient was counseled on exercise guidelines including 1-2 days of moderate exercise on opposite days of Cardiac Rehab, as tolerated  Patient has been receiving weekly education, refer to [facility]  for documentation of topics  At rest, the patient's HR was 79, O2Sat was 99%, and BP was 112/60  During exercise, the patient's peak HR was 122, while peak BP was 144/66  Durinh his exercise sessions he is able to maintain his 02 Sat at 98%  Patient rates his program a 4 on a 1-10 RPE scale  During recovery, the patient's HR was 83, O2Sat was 98%, and BP was 118/58  Patient denies having any other symptoms during exercise  Additionally, the patient's telemetry revealed NSR   Patient had correct hemodynamic responses to activity  Patient continues to work on his goals of upgrade HEP, decrease LOPEZ, decrease fatigue, decrease reliance on medication, improve diet, decrease stress level, decrease risk profile and attain his maximal level of function        Patient has been very compliant attending his CR 2X weekly  Patient lives a significant distance away from CR 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  He gives great effort at each session  He has correct hemodynamic responses to activity  Currently his back issues are his limiting factor  He has initiated the leg press and incline board to strength BLEs and stretch bilateral hamstring/gastrocs w/good results  He has not had any symptoms at this time  His program will continue to be progressed as he is able to tolerate           Medication compliance: Yes   Comments: Pt reports to be compliant with medications  Fall Risk: Low   Comments: Ambulates with a steady gait with no assist device    EKG Interpretation: NSR      EXERCISE ASSESSMENT and PLAN    Current Exercise Program in Rehab:       Frequency: 2-3 days/week Supplement with home exercise 2+ days/wk as tolerated       Minutes: 35-45       METS: 3 78            HR: 20-30 > RHR   RPE: 4-6        Modalities: Treadmill, UBE, NuStep and Recumbent bike      Exercise Progression 30 Day Goals :    Frequency: 2-3 days/week of cardiac rehab     Supplement with home exercise 2+ days/wk as tolerated    Minutes: 45-50                      >150 mins/wk of moderate intensity exercise   METS: 4 to 5   HR: 20-30 > RHR    RPE: 4-6   Modalities: Treadmill, Airdyne bike, UBE, Lifecycle, NuStep and Recumbent bike    Strength trainin-3 days / week  12-15 repetitions  1-2 sets per modality    Modalities: Leg Press and UE Free Weights    Home Exercise: Outdoor walking     Goals: 10% improvement in functional capacity - based on max METs achieved in fitness assessment, Reduced dyspnea with physical activity  0-1/10, Increase in exercise capacity by 40% - based on peak METs tolerated in cardiac rehab exercise session, Exercise 5 days/wk, >150mins/wk of moderate intensity exercise, Resume ADLs with increased strength, Attend Rehab regularly and start a home exercise program    Progression Toward Goals: Will continue to educate and progress as tolerated , Goals met: attends CR regularly, perfroms a HEP and no c/o LOPEZ       Education: benefit of exercise for CAD risk factors, home exercise guidelines, AHA guidelines to achieve >150 mins/wk of moderate exercise, RPE scale and class: Risk Factors for Heart Disease   Plan:education on home exercise guidelines, home exercise 30+ mins 2 days opposite CR and Education class: Risk Factors for Heart Disease  Readiness to change: Action:  (Changing behavior)      NUTRITION ASSESSMENT AND PLAN    Weight control:    Starting weight: 161   Current weight:   161  Waist circumference:    Starting: NA   Current:  NA    Diabetes: N/A  A1c: NA    last measured: NA    Lipid management: Last lipid profile 3/23/2022  Chol 181    HDL 37      Goals:LDL <100, HDL >40, TRG <150, CHOL <200, Improved Rate Your Plate score  >56, reduce portion sizes of meat to 3oz or less, increase intake of fish, shellfish, cook without added fat or use vegetable oil/spray, eat 3 or more servings of whole grains a day, Eat 4-5 cups of fruits and vegetables daily, use olive or canola oil in baking, eliminate butter, daily saturated fat intake <7%/13g and seldom eat out or choose lower fat menu items    Progression Toward Goals: Pt is progressing and showing improvement  toward the following goals:  following a cardiac diet   , Will continue to educate and progress as tolerated      Education: heart healthy eating  low sodium diet  hydration  nutrition for  lipid management  education class: Heart Healthy Eating  education class:  Label Reading  Plan: Education class: Reading Food Labels, Education Class: Heart Healthy Eating, replace butter with soft spreads made with olive oil, canola or yogurt, replace refined grain bread with whole grain bread, replace unhealthy snacks with fruits & vegs, eat fewer desserts and sweets, avoid processed foods, remove salt shaker from table, drink more water and learn how to read food labels  Readiness to change: Action:  (Changing behavior)      PSYCHOSOCIAL ASSESSMENT AND PLAN    Emotional:  Depression assessment:  PHQ-9 = 1-4 = Minimal Depression            Anxiety measure:  MARISSA-7 = 0-4  = Not anxious  Self-reported stress level:  6/10  Social support: Excellent    Goals:  Reduce perceived stress to 1-3/10, improved [facility] QOL < 27, Physical Fitness in [facility] Score < 3, Social Support in [facility] Score < 3, Daily Activity in [facility] Score < 3, Social Activities in [place] Score < 3, Increased interest in doing things and improved sleep    Progression Toward Goals: Will continue to educate and progress as tolerated , Goals not met: Stress level remains at 5-6/10        Education: signs/sxs of depression, benefits of a positive support system, class:  Stress and Your Health  and class:  Relaxation  Plan: Class: Stress and Your Health, Class: Relaxation, Practice relaxation techniques, Exercise and Spend time outdoors  Readiness to change: Action:  (Changing behavior)      OTHER CORE COMPONENTS     Tobacco:   Social History     Tobacco Use   Smoking Status Former Smoker    Quit date:     Years since quitting: 10 4   Smokeless Tobacco Never Used       Tobacco Use Intervention:   N/A: Pt has a remote history of smoking quit     Anginal Symptoms:  chest pressure and LOPEZ at time of the event   NTG use: Compliant with carrying NTG, Understands proper use and Pt has not used NTG since event    Blood pressure:    Restin/60   Exercise: 144/66   Recovery: 110/60    Goals: consistent BP < 130/80, reduced dietary sodium <2300mg, moderate intensity exercise >150 mins/wk and medication compliance    Progression Toward Goals: Will continue to educate and progress as tolerated , Goals met: consistent resting BP < 130/80 during this past 30 day reporting period        Education:  understanding high blood pressure and it's relationship to CAD, low sodium diet and HTN, proper use of sublingual NTG, Education class:  Common Heart Medications and Education class: Understanding Heart Disease  Plan: Class: Understanding Heart Disease, Class: Common Heart Medications, avoid places with second hand smoke, Avoid Processed foods, engage in regular exercise, eliminate salt shaker at the table and monitor home BP  Readiness to change: Action:  (Changing behavior)

## 2022-06-14 NOTE — TELEPHONE ENCOUNTER
PT called requesting that all medications going forward be generated in 90 day dosage for [facility]  Also [facility] advised PT that Brilinta was cancelled by our office  Please refill

## 2022-06-15 ENCOUNTER — CLINICAL SUPPORT (OUTPATIENT)
Dept: CARDIAC REHAB | Facility: HOSPITAL | Age: 67
End: 2022-06-15
Payer: COMMERCIAL

## 2022-06-15 DIAGNOSIS — Z95.5 STENTED CORONARY ARTERY: ICD-10-CM

## 2022-06-15 PROCEDURE — 93798 PHYS/QHP OP CAR RHAB W/ECG: CPT

## 2022-06-20 ENCOUNTER — CLINICAL SUPPORT (OUTPATIENT)
Dept: CARDIAC REHAB | Facility: HOSPITAL | Age: 67
End: 2022-06-20
Payer: COMMERCIAL

## 2022-06-20 DIAGNOSIS — Z95.5 STENTED CORONARY ARTERY: ICD-10-CM

## 2022-06-20 PROCEDURE — 93798 PHYS/QHP OP CAR RHAB W/ECG: CPT

## 2022-06-22 ENCOUNTER — CLINICAL SUPPORT (OUTPATIENT)
Dept: CARDIAC REHAB | Facility: HOSPITAL | Age: 67
End: 2022-06-22
Payer: COMMERCIAL

## 2022-06-22 DIAGNOSIS — Z95.5 STENTED CORONARY ARTERY: ICD-10-CM

## 2022-06-22 PROCEDURE — 93798 PHYS/QHP OP CAR RHAB W/ECG: CPT

## 2022-06-23 ENCOUNTER — APPOINTMENT (OUTPATIENT)
Dept: LAB | Facility: CLINIC | Age: 67
End: 2022-06-23
Payer: COMMERCIAL

## 2022-06-23 LAB
ALBUMIN SERPL BCP-MCNC: 4 G/DL (ref 3.5–5)
ALP SERPL-CCNC: 79 U/L (ref 46–116)
ALT SERPL W P-5'-P-CCNC: 46 U/L (ref 12–78)
ANION GAP SERPL CALCULATED.3IONS-SCNC: 6 MMOL/L (ref 4–13)
AST SERPL W P-5'-P-CCNC: 28 U/L (ref 5–45)
BILIRUB SERPL-MCNC: 0.53 MG/DL (ref 0.2–1)
BUN SERPL-MCNC: 17 MG/DL (ref 5–25)
CALCIUM SERPL-MCNC: 9.5 MG/DL (ref 8.3–10.1)
CHLORIDE SERPL-SCNC: 105 MMOL/L (ref 100–108)
CHOLEST SERPL-MCNC: 86 MG/DL
CO2 SERPL-SCNC: 27 MMOL/L (ref 21–32)
CREAT SERPL-MCNC: 1.08 MG/DL (ref 0.6–1.3)
GFR SERPL CREATININE-BSD FRML MDRD: 70 ML/MIN/1.73SQ M
GLUCOSE P FAST SERPL-MCNC: 111 MG/DL (ref 65–99)
HDLC SERPL-MCNC: 32 MG/DL
LDLC SERPL CALC-MCNC: 36 MG/DL (ref 0–100)
POTASSIUM SERPL-SCNC: 4.5 MMOL/L (ref 3.5–5.3)
PROT SERPL-MCNC: 8 G/DL (ref 6.4–8.2)
PSA SERPL-MCNC: 0.8 NG/ML (ref 0–4)
SODIUM SERPL-SCNC: 138 MMOL/L (ref 136–145)
TRIGL SERPL-MCNC: 88 MG/DL

## 2022-06-23 PROCEDURE — 80061 LIPID PANEL: CPT

## 2022-06-23 PROCEDURE — 36415 COLL VENOUS BLD VENIPUNCTURE: CPT

## 2022-06-23 PROCEDURE — 80053 COMPREHEN METABOLIC PANEL: CPT

## 2022-06-23 PROCEDURE — G0103 PSA SCREENING: HCPCS

## 2022-06-27 ENCOUNTER — CLINICAL SUPPORT (OUTPATIENT)
Dept: CARDIAC REHAB | Facility: HOSPITAL | Age: 67
End: 2022-06-27
Payer: COMMERCIAL

## 2022-06-27 DIAGNOSIS — Z95.5 STENTED CORONARY ARTERY: ICD-10-CM

## 2022-06-27 PROCEDURE — 93798 PHYS/QHP OP CAR RHAB W/ECG: CPT

## 2022-06-29 ENCOUNTER — CLINICAL SUPPORT (OUTPATIENT)
Dept: CARDIAC REHAB | Facility: HOSPITAL | Age: 67
End: 2022-06-29
Payer: COMMERCIAL

## 2022-06-29 DIAGNOSIS — Z95.5 STENTED CORONARY ARTERY: ICD-10-CM

## 2022-06-29 PROCEDURE — 93798 PHYS/QHP OP CAR RHAB W/ECG: CPT

## 2022-07-01 DIAGNOSIS — I10 PRIMARY HYPERTENSION: ICD-10-CM

## 2022-07-01 RX ORDER — METOPROLOL SUCCINATE 25 MG/1
TABLET, EXTENDED RELEASE ORAL
Qty: 30 TABLET | Refills: 11 | Status: SHIPPED | OUTPATIENT
Start: 2022-07-01

## 2022-07-06 ENCOUNTER — CLINICAL SUPPORT (OUTPATIENT)
Dept: CARDIAC REHAB | Facility: HOSPITAL | Age: 67
End: 2022-07-06
Payer: COMMERCIAL

## 2022-07-06 DIAGNOSIS — Z95.5 STENTED CORONARY ARTERY: ICD-10-CM

## 2022-07-06 PROCEDURE — 93798 PHYS/QHP OP CAR RHAB W/ECG: CPT

## 2022-07-07 ENCOUNTER — OFFICE VISIT (OUTPATIENT)
Dept: CARDIOLOGY CLINIC | Facility: CLINIC | Age: 67
End: 2022-07-07
Payer: COMMERCIAL

## 2022-07-07 VITALS
OXYGEN SATURATION: 98 % | HEIGHT: 68 IN | HEART RATE: 85 BPM | BODY MASS INDEX: 23.34 KG/M2 | DIASTOLIC BLOOD PRESSURE: 82 MMHG | SYSTOLIC BLOOD PRESSURE: 130 MMHG | RESPIRATION RATE: 16 BRPM | WEIGHT: 154 LBS

## 2022-07-07 DIAGNOSIS — E78.2 MIXED HYPERLIPIDEMIA: ICD-10-CM

## 2022-07-07 DIAGNOSIS — I10 PRIMARY HYPERTENSION: ICD-10-CM

## 2022-07-07 DIAGNOSIS — I25.10 ATHEROSCLEROSIS OF NATIVE CORONARY ARTERY OF NATIVE HEART WITHOUT ANGINA PECTORIS: Primary | ICD-10-CM

## 2022-07-07 PROCEDURE — 3079F DIAST BP 80-89 MM HG: CPT | Performed by: INTERNAL MEDICINE

## 2022-07-07 PROCEDURE — 99214 OFFICE O/P EST MOD 30 MIN: CPT | Performed by: INTERNAL MEDICINE

## 2022-07-07 PROCEDURE — 1160F RVW MEDS BY RX/DR IN RCRD: CPT | Performed by: INTERNAL MEDICINE

## 2022-07-07 PROCEDURE — 3075F SYST BP GE 130 - 139MM HG: CPT | Performed by: INTERNAL MEDICINE

## 2022-07-07 NOTE — PROGRESS NOTES
[facility]  [facility]  [address] [place] [address],[address] [place] [id number]  Cardiology Follow Up    Elda Mares III  1955  [id number]      1  Atherosclerosis of native coronary artery of native heart without angina pectoris     2  Primary hypertension     3  Mixed hyperlipidemia  Lipid Panel with Direct LDL reflex       Chief Complaint   Patient presents with    Follow-up     Review Rx       Interval History:   47-year-old male with coronary artery disease status post PCI of mid LAD on 04/05/2022(Xience jeannie 2 75 x 38 mm post dilation balloon 3 mm), hypertension, hyperlipidemia, family history of premature coronary artery disease, GERD, ex-smoker presented for follow-up    Since coronary revascularization patient chest burning has completely resolved  He was having diffuse joint aches and he decrease his Lipitor to 20 mg before 2 weeks and things are improving  He is currently doing cardiac rehab without any cardiac symptoms  He denies chest pain or chest burning, shortness of breath, dizziness, orthopnea, leg edema, paroxysmal nocturnal dyspnea or loss of consciousness  No chest pain or shortness of breath on exertion  Denies black stool or blood in stool    Current medications reviewed  Labs from June 2022 reviewed  Creatinine 1 08 with AST ALT within normal limit  LDL 36  Hemoglobin 14 3    Review of Systems:   All review of system negative except as mentioned above    Patient Active Problem List   Diagnosis    Other chest pain    Primary hypertension    LOPEZ (dyspnea on exertion)    Nicotine dependence    Mixed hyperlipidemia    Medicare annual wellness visit, initial     Past Medical History:   Diagnosis Date    GERD (gastroesophageal reflux disease)     Hyperlipidemia     Hypertension      Social History     Socioeconomic History    Marital status: Single     Spouse name: Not on file    Number of children: Not on file    Years of education: Not on file    Highest education level: Not on file   Occupational History    Not on file   Tobacco Use    Smoking status: Former Smoker     Quit date: 2012     Years since quitting: 10 5    Smokeless tobacco: Never Used   Vaping Use    Vaping Use: Every day    Substances: Nicotine   Substance and Sexual Activity    Alcohol use: Not Currently     Comment: recovering alcoholic    Drug use: Yes     Types: Marijuana     Comment: medical marijuana card    Sexual activity: Not Currently   Other Topics Concern    Not on file   Social History Narrative    Not on file     Social Determinants of Health     Financial Resource Strain: Not on file   Food Insecurity: Not on file   Transportation Needs: Not on file   Physical Activity: Not on file   Stress: Not on file   Social Connections: Not on file   Intimate Partner Violence: Not on file   Housing Stability: Not on file      Family History   Problem Relation Age of Onset    Cancer Mother     Heart attack Father         passed at 64    Cancer Sister      Past Surgical History:   Procedure Laterality Date    CARDIAC CATHETERIZATION N/A 4/27/2022    Procedure: Cardiac catheterization;  Surgeon: Michelle Bishop MD;  Location: [address] [place] [address] CATH LAB; Service: Cardiology    CARDIAC CATHETERIZATION Left 4/27/2022    Procedure: Cardiac Left Heart Cath;  Surgeon: Michelle Bishop MD;  Location: [address] [place] [address] CATH LAB;   Service: Cardiology    HERNIA REPAIR         Current Outpatient Medications:     aspirin (ECOTRIN LOW STRENGTH) 81 mg EC tablet, Take 1 tablet (81 mg total) by mouth daily, Disp: 30 tablet, Rfl: 2    atorvastatin (LIPITOR) 80 mg tablet, Take 1 tablet (80 mg total) by mouth daily with dinner (Patient taking differently: Take 80 mg by mouth daily with dinner Currently taking 20 mg), Disp: 90 tablet, Rfl: 3    isosorbide mononitrate (IMDUR) 30 mg 24 hr tablet, Take 1 tablet (30 mg total) by mouth daily, Disp: 30 tablet, Rfl: 3    metoprolol succinate (TOPROL-XL) 25 mg 24 hr tablet, TAKE 1 TABLET DAILY, Disp: 30 tablet, Rfl: 11    nitroglycerin (NITROSTAT) 0 4 mg SL tablet, Place 1 tablet (0 4 mg total) under the tongue every 5 (five) minutes as needed for chest pain, Disp: 100 tablet, Rfl: 0    ticagrelor (Brilinta) 90 MG, Take 1 tablet (90 mg total) by mouth every 12 (twelve) hours, Disp: 180 tablet, Rfl: 0  No Known Allergies    Labs:  Admission on 04/27/2022, Discharged on 04/28/2022   Component Date Value    Sodium 04/27/2022 139     Potassium 04/27/2022 5 0     Chloride 04/27/2022 105     CO2 04/27/2022 27     ANION GAP 04/27/2022 7     BUN 04/27/2022 24     Creatinine 04/27/2022 1 55 (A)    Glucose 04/27/2022 118     Glucose, Fasting 04/27/2022 118 (A)    Calcium 04/27/2022 8 7     eGFR 04/27/2022 45     Activated Clotting Time,* 04/27/2022 265 (A)    Specimen Type 04/27/2022 VENOUS     Activated Clotting Time,* 04/27/2022 414 (A)    Specimen Type 04/27/2022 VENOUS     Sodium 04/28/2022 137     Potassium 04/28/2022 4 0     Chloride 04/28/2022 106     CO2 04/28/2022 28     ANION GAP 04/28/2022 3 (A)    BUN 04/28/2022 17     Creatinine 04/28/2022 1 04     Glucose 04/28/2022 108     Glucose, Fasting 04/28/2022 108 (A)    Calcium 04/28/2022 8 7     eGFR 04/28/2022 73     WBC 04/28/2022 6 55     RBC 04/28/2022 4 68     Hemoglobin 04/28/2022 14 3     Hematocrit 04/28/2022 42 5     MCV 04/28/2022 91     MCH 04/28/2022 30 6     MCHC 04/28/2022 33 6     RDW 04/28/2022 12 3     Platelets 86/79/0704 205     MPV 04/28/2022 10 1    Appointment on 04/25/2022   Component Date Value    PSA 06/23/2022 0 8    Appointment on 04/19/2022   Component Date Value    Protime 04/19/2022 12 7     INR 04/19/2022 0 99     WBC 04/19/2022 6 10     RBC 04/19/2022 4 94     Hemoglobin 04/19/2022 14 8     Hematocrit 04/19/2022 45 8     MCV 04/19/2022 93     [address] 04/19/2022 30 0     MCHC 04/19/2022 32 3     RDW 04/19/2022 12 3     Platelets 58/10/4386 223     MPV 04/19/2022 11 0  Sodium 06/23/2022 138     Potassium 06/23/2022 4 5     Chloride 06/23/2022 105     CO2 06/23/2022 27     ANION GAP 06/23/2022 6     BUN 06/23/2022 17     Creatinine 06/23/2022 1 08     Glucose, Fasting 06/23/2022 111 (A)    Calcium 06/23/2022 9 5     AST 06/23/2022 28     ALT 06/23/2022 46     Alkaline Phosphatase 06/23/2022 79     Total Protein 06/23/2022 8 0     Albumin 06/23/2022 4 0     Total Bilirubin 06/23/2022 0 53     eGFR 06/23/2022 70     Cholesterol 06/23/2022 86     Triglycerides 06/23/2022 88     HDL, Direct 06/23/2022 32 (A)    LDL Calculated 06/23/2022 36    Telephone on 04/13/2022   Component Date Value    Sodium 04/25/2022 135 (A)    Potassium 04/25/2022 4 7     Chloride 04/25/2022 103     CO2 04/25/2022 29     ANION GAP 04/25/2022 3 (A)    BUN 04/25/2022 23     Creatinine 04/25/2022 1 21     Glucose, Fasting 04/25/2022 113 (A)    Calcium 04/25/2022 9 1     eGFR 04/25/2022 61    Hospital Outpatient Visit on 04/12/2022   Component Date Value    Rest Nuclear Isotope Dose 04/12/2022 10 87     Stress Nuclear Isotope D* 04/12/2022 32 70     Baseline HR 04/12/2022 74     Baseline BP 04/12/2022 184/88     O2 sat rest 04/12/2022 98     Stress peak HR 04/12/2022 112     Post peak BP 04/12/2022 138     Rate Pressure Product 04/12/2022 15,456 0     O2 sat peak 04/12/2022 100     Recovery HR 04/12/2022 88     Recovery BP 04/12/2022 146/76     Angina Index 04/12/2022 0     Stress/rest perfusion ra* 04/12/2022 1 37     End diastolic index (mL/* 73/00/2937 67 0     EF (%) 04/12/2022 63     End systolic index (mL/m* 41/42/8736 25 0     Base ST Depresion (mm) 04/12/2022 0     ST Depression (mm) 04/12/2022 0     Protocol Name 04/12/2022 PRINCESS     Time In Exercise Phase 04/12/2022 00:03:00     MAX   SYSTOLIC BP 06/51/0344 537     Max Diastolic Bp 46/84/8954 88     Max Heart Rate 04/12/2022 112     Max Predicted Heart Rate 04/12/2022 152     Reason for Termination 04/12/2022 Protocol Complete     Test Indication 04/12/2022 Precordial Pain     Target Hr Formular 04/12/2022 (220 - Age)*85%    Hospital Outpatient Visit on 04/08/2022   Component Date Value    A4C EF 04/08/2022 76     LVIDd 04/08/2022 3 90     LVIDS 04/08/2022 2 40     IVSd 04/08/2022 1 10     LVPWd 04/08/2022 1 10     FS 04/08/2022 38     MV E' Tissue Velocity Se* 04/08/2022 7     MV E' Tissue Velocity La* 04/08/2022 8     E wave deceleration time 04/08/2022 172     MV Peak E Soham 04/08/2022 60     MV Peak A Soham 04/08/2022 0 77     AV LVOT peak gradient 04/08/2022 6     LVOT peak VTI 04/08/2022 24 33     LVOT peak soham 04/08/2022 1 22     RVID d 04/08/2022 3 2     LA size 04/08/2022 2 7     LA length (A2C) 04/08/2022 4 30     FERNANDO A4C 04/08/2022 10 1     RAA A4C 04/08/2022 10 9     Ao peak soham retrograde 04/08/2022 1 12     Ao VTI 04/08/2022 20 62     AV mean gradient 04/08/2022 3     LVOT mn grad 04/08/2022 3 0     AV peak gradient 04/08/2022 5     MV stenosis pressure 1/2* 04/08/2022 50     MV valve area p 1/2 meth* 04/08/2022 4 40     Ao root 04/08/2022 3 80     Aortic valve mean veloci* 04/08/2022 8 10     Left ventricular stroke * 04/08/2022 46 00     IVS 04/08/2022 1 1     LEFT VENTRICLE SYSTOLIC * 43/05/8886 20     LV DIASTOLIC VOLUME (MOD* 44/46/3845 66     Left Atrium Area-systoli* 04/08/2022 11     Left Atrium Area-systoli* 04/08/2022 11 2     LVSV, 2D 04/08/2022 46     AV Velocity Ratio 04/08/2022 1 09     ZLVPWD 04/08/2022 3 10     ZLVIDS 04/08/2022 -2 54     ZLVIDD 04/08/2022 -3 21     ZIVSD 04/08/2022 2 99     LV EF 04/08/2022 60    Appointment on 03/23/2022   Component Date Value    Sodium 03/23/2022 137     Potassium 03/23/2022 4 3     Chloride 03/23/2022 106     CO2 03/23/2022 25     ANION GAP 03/23/2022 6     BUN 03/23/2022 20     Creatinine 03/23/2022 1 08     Glucose, Fasting 03/23/2022 116 (A)    Calcium 03/23/2022 9 0     AST 03/23/2022 20     ALT 03/23/2022 24     Alkaline Phosphatase 03/23/2022 80     Total Protein 03/23/2022 7 3     Albumin 03/23/2022 4 0     Total Bilirubin 03/23/2022 0 43     eGFR 03/23/2022 70     Cholesterol 03/23/2022 181     Triglycerides 03/23/2022 132     HDL, Direct 03/23/2022 37 (A)    LDL Calculated 03/23/2022 118 (A)    WBC 03/23/2022 7 11     RBC 03/23/2022 5 10     Hemoglobin 03/23/2022 15 3     Hematocrit 03/23/2022 46 6     MCV 03/23/2022 91     MCH 03/23/2022 30 0     MCHC 03/23/2022 32 8     RDW 03/23/2022 12 1     MPV 03/23/2022 10 6     Platelets 55/17/5871 244     nRBC 03/23/2022 0     Neutrophils Relative 03/23/2022 67     Immat GRANS % 03/23/2022 0     Lymphocytes Relative 03/23/2022 26     Monocytes Relative 03/23/2022 7     Eosinophils Relative 03/23/2022 0     Basophils Relative 03/23/2022 0     Neutrophils Absolute 03/23/2022 4 71     Immature Grans Absolute 03/23/2022 0 02     Lymphocytes Absolute 03/23/2022 1 87     Monocytes Absolute 03/23/2022 0 46     Eosinophils Absolute 03/23/2022 0 03     Basophils Absolute 03/23/2022 0 02    Office Visit on 03/11/2022   Component Date Value    Ventricular Rate 03/11/2022 80     Atrial Rate 03/11/2022 80     DC Interval 03/11/2022 164     QRSD Interval 03/11/2022 72     QT Interval 03/11/2022 348     QTC Interval 03/11/2022 401     P Axis 03/11/2022 75     QRS Axis 03/11/2022 73     T Wave Axis 03/11/2022 78      Imaging: No results found  Physical Exam:  General:  moderate built, awake, alert and oriented x3, not in distress  Neck: supple, no JVD  Eyes: PERRL, conjunctiva normal  Lungs:  Bilateral air entry positive, no wheeze/rhonchi or crackle  Heart:  S1-S2 normal, no murmur  Abdomen:  Soft ,nondistended ,nontender, bowel sounds positive  Extremities:  No leg edema, no deformity, ROM normal, right radial cardiac catheterization site no bleeding or hematoma    Right distal radial artery pulse same as before angiogram  Neuro:  Moving all extremities, speech clear  Skin: warm, no rash    /82 (BP Location: Left arm, Patient Position: Sitting, Cuff Size: Standard)   Pulse 85   Resp 16   Ht 5' 8" (1 727 m)   Wt 69 9 kg (154 lb)   SpO2 98%   BMI 23 42 kg/m²     Cardiographics :    Abnormal stress test in April 2022 suggestive of LAD territory ischemia  Echo showed preserved LVEF  Cardiac catheterization on 04/27/2022  Mid LAD 99 % stenosis status post successful PCI with drug-eluting stent x1  Proximal LAD 40 percent stenosis  Proximal circumflex discrete 40 percent stenosis  OM1 is medium to large size long vessel with 80% percent stenosis in mid  Proximal RCA discrete 40 percent stenosis    I again discussed patient cardiac catheterization in detail today  Assessment:    1  Coronary artery disease status post PCI of mid LAD with drug-eluting stent x1 on 04/27/2022  Patient has OM1 stenosis of 80%  Currently denies anginal symptoms at rest or on exertion    2  Hypertension  3  Hyperlipidemia  LDL at goal Lipitor was decreased given side effects of diffuse joint aches  4  Family history of premature coronary artery disease  5  GERD  6   Ex-smoker    Recommendations:    Patient is doing well from cardiology standpoint at present time  Patient to continue aspirin, Brilinta 90 mg twice a day, Lipitor 20 mg, Imdur 30 mg, Toprol-XL 25    Patient advised to continue cardiac rehab  At present time patient will be medically managed and if he fails medical therapy then revascularization of OM1 should be considered  He will benefit from stress test during follow-up  Patient was educated about cardiac symptoms to watch out for and if he experiences those then call 911 or go to nearest ED    Repeat lipid panel after a month and if LDL not at goal then will need titration of Lipitor to 40 mg    Advised to take low-salt, low-fat/low-cholesterol diet  Return to clinic in 6 months or early as needed  Above all discussed with patient    Patient understands and agrees

## 2022-07-08 ENCOUNTER — CLINICAL SUPPORT (OUTPATIENT)
Dept: CARDIAC REHAB | Facility: HOSPITAL | Age: 67
End: 2022-07-08
Payer: COMMERCIAL

## 2022-07-08 DIAGNOSIS — Z95.5 STENTED CORONARY ARTERY: ICD-10-CM

## 2022-07-08 PROCEDURE — 93798 PHYS/QHP OP CAR RHAB W/ECG: CPT

## 2022-07-11 ENCOUNTER — CLINICAL SUPPORT (OUTPATIENT)
Dept: CARDIAC REHAB | Facility: HOSPITAL | Age: 67
End: 2022-07-11
Payer: COMMERCIAL

## 2022-07-11 DIAGNOSIS — Z95.5 STENTED CORONARY ARTERY: Primary | ICD-10-CM

## 2022-07-11 PROCEDURE — 93798 PHYS/QHP OP CAR RHAB W/ECG: CPT

## 2022-07-12 NOTE — PROGRESS NOTES
Cardiac Rehabilitation Plan of Care   60 Day Reassessment          Today's date: 2022   # of Exercise Sessions Completed:  -  Patient had completed one visit at [facility]  Patient name: Benita Tinajero      : 1955  Age: 74 y o  MRN: [id number]  Referring Physician: Abbi Moffett, PT  Cardiologist: Ludivina Duong MD  Provider: Tae russo  Clinician: Jose Bernard, MPT, CCRP    Dx:   Encounter Diagnosis   Name Primary?  Stented coronary artery    JOSÉ LUIS to mid LAD    Date of onset: 2022      SUMMARY OF PROGRESS:  Mariluz Reid has completed 17  exercise sessions at Cardiac Rehab s/p JOSÉ LUIS  to mid LAD  Patient admits to 100% medication compliance  Patient reports the following physical limitations: occasional LOPEZ, fatigue and general functional decline  Patient does not require supplemental O2 at this time, and does not require 02/CPAP to sleep  Patient's PHQ9 Score was two  At this time, patient denies having depression  Patient's GAD7 Score was two  At this time, patient admits to having anxiety  Currently, the patient does follow a formal exercise program at home, including walking outdoors weather permitting  Patient notes activity is limited by back issues at this time  Patient was counseled on exercise guidelines including 1-2 days of moderate exercise on opposite days of Cardiac Rehab, as tolerated  Patient has been receiving weekly education, refer to [facility]  for documentation of topics  At rest, the patient's HR was 66, O2Sat was 99%, and BP was 124/58  During exercise, the patient's peak HR was 115, while peak BP was 152/64  Durinh his exercise sessions he is able to maintain his 02 Sat at 98%  Patient rates his program a 4 on a 1-10 RPE scale  During recovery, the patient's HR was 79, O2Sat was 98%, and BP was 110/58  Patient denies having any other symptoms during exercise  Additionally, the patient's telemetry revealed NSR  Patient had correct hemodynamic responses to activity  Patient continues to work on his goals of upgrade HEP, decrease LOPEZ, decrease fatigue, decrease reliance on medication, improve diet, decrease stress level, decrease risk profile and attain his maximal level of function        Patient has been very compliant attending his CR 2X weekly  Patient lives a significant distance away from CR 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  He gives great effort at each session  He has been able to work consistently at a 4 54 MET Level  He denies any symptoms  He has correct hemodynamic responses to activity  Currently his back issues are his limiting factor  He has  Had good results perfroming the leg press and incline board to strength BLEs and stretch bilateral hamstring/gastrocs w/good results  His program will continue to be progressed as he is able to tolerate           Medication compliance: Yes   Comments: Pt reports to be compliant with medications  Fall Risk: Low   Comments: Ambulates with a steady gait with no assist device    EKG Interpretation: NSR      EXERCISE ASSESSMENT and PLAN    Current Exercise Program in Rehab:       Frequency: 2-3 days/week Supplement with home exercise 2+ days/wk as tolerated       Minutes:45-50     METS: 4 54            HR: 20-30 > RHR   RPE: 4-6        Modalities: Treadmill, UBE, NuStep and Recumbent bike      Exercise Progression 30 Day Goals :    Frequency: 2-3 days/week of cardiac rehab     Supplement with home exercise 2+ days/wk as tolerated    Minutes: 45-50                      >150 mins/wk of moderate intensity exercise   METS: 5 to 6   HR: 20-30 > RHR    RPE: 4-6   Modalities: Treadmill, Airdyne bike, UBE, Lifecycle, NuStep and Recumbent bike    Strength trainin-3 days / week  12-15 repetitions  1-2 sets per modality    Modalities: Leg Press and UE Free Weights and incline board stretch    Home Exercise: Outdoor walking     Goals: 10% improvement in functional capacity - based on max METs achieved in fitness assessment, Reduced dyspnea with physical activity  0-1/10, Increase in exercise capacity by 40% - based on peak METs tolerated in cardiac rehab exercise session, Exercise 5 days/wk, >150mins/wk of moderate intensity exercise, Resume ADLs with increased strength, Attend Rehab regularly and start a home exercise program    Progression Toward Goals: Will continue to educate and progress as tolerated , Goals met: attends CR regularly, performs a HEP, regular attendance in CR, increased MET level ability and no c/o LOPEZ       Education: benefit of exercise for CAD risk factors, home exercise guidelines, AHA guidelines to achieve >150 mins/wk of moderate exercise, RPE scale and class: Risk Factors for Heart Disease   Plan:education on home exercise guidelines, home exercise 30+ mins 2 days opposite CR and Education class: Risk Factors for Heart Disease  Readiness to change: Action:  (Changing behavior)      NUTRITION ASSESSMENT AND PLAN    Weight control:    Starting weight: 161   Current weight:   154  Waist circumference:    Starting: NA   Current:  NA    Diabetes: N/A  A1c: NA    last measured: NA    Lipid management: Last lipid profile 3/23/2022  Chol 181    HDL 37      Goals:LDL <100, HDL >40, TRG <150, CHOL <200, Improved Rate Your Plate score  >78, reduce portion sizes of meat to 3oz or less, increase intake of fish, shellfish, cook without added fat or use vegetable oil/spray, eat 3 or more servings of whole grains a day, Eat 4-5 cups of fruits and vegetables daily, use olive or canola oil in baking, eliminate butter, daily saturated fat intake <7%/13g and seldom eat out or choose lower fat menu items    Progression Toward Goals: Pt is progressing and showing improvement  toward the following goals:  following a cardiac diet and maintaining a healthy weight     , Will continue to educate and progress as tolerated      Education: heart healthy eating  low sodium diet  hydration  nutrition for  lipid management  education class: Heart Healthy Eating  education class:  Label Reading  Plan: Education class: Reading Food Labels, Education Class: Heart Healthy Eating, replace butter with soft spreads made with olive oil, canola or yogurt, replace refined grain bread with whole grain bread, replace unhealthy snacks with fruits & vegs, eat fewer desserts and sweets, avoid processed foods, remove salt shaker from table, drink more water and learn how to read food labels  Readiness to change: Action:  (Changing behavior)      PSYCHOSOCIAL ASSESSMENT AND PLAN    Emotional:  Depression assessment:  PHQ-9 = 1-4 = Minimal Depression            Anxiety measure:  MARISSA-7 = 0-4  = Not anxious  Self-reported stress level:  6/10  Social support: Excellent    Goals:  Reduce perceived stress to 1-3/10, improved [facility] QOL < 27, Physical Fitness in [facility] Score < 3, Social Support in [facility] Score < 3, Daily Activity in [facility] Score < 3, Social Activities in [place] Score < 3, Increased interest in doing things and improved sleep    Progression Toward Goals: Will continue to educate and progress as tolerated , Goals not met: Stress level remains at 5-6/10   , Goals met: increased physical and social activities      Education: signs/sxs of depression, benefits of a positive support system, class:  Stress and Your Health  and class:  Relaxation  Plan: Class: Stress and Your Health, Class: Relaxation, Practice relaxation techniques, Exercise and Spend time outdoors  Readiness to change: Action:  (Changing behavior)      OTHER CORE COMPONENTS     Tobacco:   Social History     Tobacco Use   Smoking Status Former Smoker    Quit date: 2012    Years since quitting: 10 5   Smokeless Tobacco Never Used       Tobacco Use Intervention:   N/A: Pt has a remote history of smoking quit 2012    Anginal Symptoms:  chest pressure and LOPEZ at time of the event   NTG use: Compliant with carrying NTG, Understands proper use and Pt has not used NTG since event    Blood pressure:    Restin/58   Exercise: 152/64   Recovery: 110/58    Goals: consistent BP < 130/80, reduced dietary sodium <2300mg, moderate intensity exercise >150 mins/wk and medication compliance    Progression Toward Goals: Will continue to educate and progress as tolerated , Goals met: consistent resting BP < 130/80 during this past 30 day reporting period        Education:  understanding high blood pressure and it's relationship to CAD, low sodium diet and HTN, proper use of sublingual NTG, Education class:  Common Heart Medications and Education class: Understanding Heart Disease  Plan: Class: Understanding Heart Disease, Class: Common Heart Medications, avoid places with second hand smoke, Avoid Processed foods, engage in regular exercise, eliminate salt shaker at the table and monitor home BP  Readiness to change: Action:  (Changing behavior)

## 2022-07-13 ENCOUNTER — CLINICAL SUPPORT (OUTPATIENT)
Dept: CARDIAC REHAB | Facility: HOSPITAL | Age: 67
End: 2022-07-13
Payer: COMMERCIAL

## 2022-07-13 DIAGNOSIS — Z95.5 STENTED CORONARY ARTERY: ICD-10-CM

## 2022-07-13 PROCEDURE — 93798 PHYS/QHP OP CAR RHAB W/ECG: CPT

## 2022-07-18 ENCOUNTER — CLINICAL SUPPORT (OUTPATIENT)
Dept: CARDIAC REHAB | Facility: HOSPITAL | Age: 67
End: 2022-07-18
Payer: COMMERCIAL

## 2022-07-18 DIAGNOSIS — Z95.5 STENTED CORONARY ARTERY: ICD-10-CM

## 2022-07-18 PROCEDURE — 93798 PHYS/QHP OP CAR RHAB W/ECG: CPT

## 2022-07-20 ENCOUNTER — CLINICAL SUPPORT (OUTPATIENT)
Dept: CARDIAC REHAB | Facility: HOSPITAL | Age: 67
End: 2022-07-20
Payer: COMMERCIAL

## 2022-07-20 DIAGNOSIS — Z95.5 STENTED CORONARY ARTERY: ICD-10-CM

## 2022-07-20 PROCEDURE — 93798 PHYS/QHP OP CAR RHAB W/ECG: CPT

## 2022-07-25 ENCOUNTER — CLINICAL SUPPORT (OUTPATIENT)
Dept: CARDIAC REHAB | Facility: HOSPITAL | Age: 67
End: 2022-07-25
Payer: COMMERCIAL

## 2022-07-25 DIAGNOSIS — Z95.5 STENTED CORONARY ARTERY: ICD-10-CM

## 2022-07-25 PROCEDURE — 93798 PHYS/QHP OP CAR RHAB W/ECG: CPT

## 2022-07-26 DIAGNOSIS — I25.10 CORONARY ARTERY DISEASE INVOLVING NATIVE CORONARY ARTERY OF NATIVE HEART WITHOUT ANGINA PECTORIS: ICD-10-CM

## 2022-07-26 RX ORDER — NITROGLYCERIN 0.4 MG/1
0.4 TABLET SUBLINGUAL
Qty: 100 TABLET | Refills: 0 | Status: SHIPPED | OUTPATIENT
Start: 2022-07-26

## 2022-07-26 NOTE — TELEPHONE ENCOUNTER
Name of Caller:Júnior left a      Call back Number:[phone]    Medication(s):Nitroglycerin   Are we prescribing provider?:    30 or 90 day supply:    Pharmacy name/number:[facility]    Last or Next appt?:  Patient lost his bottle of Nitroglycerin and would like a refill

## 2022-07-27 ENCOUNTER — CLINICAL SUPPORT (OUTPATIENT)
Dept: CARDIAC REHAB | Facility: HOSPITAL | Age: 67
End: 2022-07-27
Payer: COMMERCIAL

## 2022-07-27 DIAGNOSIS — Z95.5 STENTED CORONARY ARTERY: ICD-10-CM

## 2022-07-27 PROCEDURE — 93798 PHYS/QHP OP CAR RHAB W/ECG: CPT

## 2022-08-08 ENCOUNTER — CLINICAL SUPPORT (OUTPATIENT)
Dept: CARDIAC REHAB | Facility: HOSPITAL | Age: 67
End: 2022-08-08
Payer: COMMERCIAL

## 2022-08-08 DIAGNOSIS — Z95.5 STENTED CORONARY ARTERY: Primary | ICD-10-CM

## 2022-08-08 PROCEDURE — 93798 PHYS/QHP OP CAR RHAB W/ECG: CPT

## 2022-08-08 NOTE — PROGRESS NOTES
Cardiac Rehabilitation Plan of Care   60 Day Reassessment          Today's date: 2022   # of Exercise Sessions Completed:  -  Patient had completed one visit at [facility]  Patient name: Hui Sykes      : 1955  Age: 74 y o  MRN: [id number]  Referring Physician: Hoa Otto PT  Cardiologist: Irina Alfonso MD  Provider: [facility]  Clinician: Yajaira Joshua, MPT, CCRP    Dx:   Encounter Diagnosis   Name Primary?  Stented coronary artery    JOSÉ LUIS to mid LAD    Date of onset: 2022      SUMMARY OF PROGRESS:  Angel Marina has completed 22 exercise sessions at Cardiac Rehab s/p JOSÉ LUIS  to mid LAD  Patient admits to 100% medication compliance  Patient reports he no longer has physical limitations and has resumed all his ADls  Patient does not require supplemental O2 at this time, and does not require 02/CPAP to sleep  Patient's PHQ9 Score was two  At this time, patient denies having depression  Patient's GAD7 Score was two  At this time, patient admits to having anxiety  Currently, the patient does follow a formal exercise program at home, including walking outdoors weather permitting  Patient notes activity is limited by back issues at this time  Patient was counseled on exercise guidelines including 1-2 days of moderate exercise on opposite days of Cardiac Rehab, as tolerated  Patient has been receiving weekly education, refer to [facility]  for documentation of topics  At rest, the patient's HR was 80, O2Sat was 99%, and BP was 120/58  During exercise, the patient's peak HR was 96, while peak BP was 142/66  During his exercise sessions he is able to maintain his 02 Sat at 98%  Patient rates his program a 4 on a 1-10 RPE scale  During recovery, the patient's HR was 79, O2Sat was 98%, and BP was 124/58  Patient denies having any other symptoms during exercise  Additionally, the patient's telemetry revealed NSR  Patient had correct hemodynamic responses to activity       Patient continues to work on his goals of decrease reliance on medication, improve diet, decrease stress level, decrease risk profile and attain his maximal level of function        Patient has been very compliant attending his CR 2X weekly  Patient lives a significant distance away from CR 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  He gives great effort at each session  He has been able to work consistently at a 4 54 MET Level  He denies any symptoms  He has correct hemodynamic responses to activity  He has resumed all ADLs w/out issue  He has improved his Lipid Panel, hoping to discontinue use of a Statin  He has lost 4# since his last 30 day report  Currently his back issues are his limiting factor  He has had good results perfroming the leg press and incline board to strength BLEs and stretch bilateral hamstring/gastrocs w/good results  His program will continue to be progressed as he is able to tolerate           Medication compliance: Yes   Comments: Pt reports to be compliant with medications  Fall Risk: Low   Comments: Ambulates with a steady gait with no assist device    EKG Interpretation: NSR      EXERCISE ASSESSMENT and PLAN    Current Exercise Program in Rehab:       Frequency: 2-3 days/week Supplement with home exercise 2+ days/wk as tolerated       Minutes:45-50     METS: 4 54            HR: 20-30 > RHR   RPE: 4-6        Modalities: Treadmill, UBE, NuStep and Recumbent bike      Exercise Progression 30 Day Goals :    Frequency: 2-3 days/week of cardiac rehab     Supplement with home exercise 2+ days/wk as tolerated    Minutes: 45-50                      >150 mins/wk of moderate intensity exercise   METS: 5 to 6   HR: 20-30 > RHR    RPE: 4-6   Modalities: Treadmill, Airdyne bike, UBE, Lifecycle, NuStep and Recumbent bike    Strength trainin-3 days / week  12-15 repetitions  1-2 sets per modality    Modalities: Leg Press and UE Free Weights and incline board stretch    Home Exercise: Outdoor walking 1-2X daily     Goals: 10% improvement in functional capacity - based on max METs achieved in fitness assessment, Reduced dyspnea with physical activity  0-1/10, Increase in exercise capacity by 40% - based on peak METs tolerated in cardiac rehab exercise session, Exercise 5 days/wk, >150mins/wk of moderate intensity exercise, Resume ADLs with increased strength, Attend Rehab regularly and start a home exercise program    Progression Toward Goals: Will continue to educate and progress as tolerated , Goals met: attends CR regularly, performs a HEP, regular attendance in CR, increased MET level ability and no c/o LOPEZ       Education: benefit of exercise for CAD risk factors, home exercise guidelines, AHA guidelines to achieve >150 mins/wk of moderate exercise, RPE scale and class: Risk Factors for Heart Disease   Plan:education on home exercise guidelines, home exercise 30+ mins 2 days opposite CR and Education class: Risk Factors for Heart Disease  Readiness to change: Action:  (Changing behavior)      NUTRITION ASSESSMENT AND PLAN    Weight control:    Starting weight: 161   Current weight:   150  Waist circumference:    Starting: NA   Current:  NA    Diabetes: N/A  A1c: NA    last measured: NA    Lipid management: Last lipid profile 3/23/2022  Chol 181    HDL 37     Most current: 6/23/2022; Chol 86, Tri 88, HDL 32 and LDL 36    Goals:LDL <100, HDL >40, TRG <150, CHOL <200, Improved Rate Your Plate score  >25, reduce portion sizes of meat to 3oz or less, increase intake of fish, shellfish, cook without added fat or use vegetable oil/spray, eat 3 or more servings of whole grains a day, Eat 4-5 cups of fruits and vegetables daily, use olive or canola oil in baking, eliminate butter, daily saturated fat intake <7%/13g and seldom eat out or choose lower fat menu items    Progression Toward Goals: Pt is progressing and showing improvement  toward the following goals:  following a cardiac diet and maintaining a healthy weight     , Will continue to educate and progress as tolerated , Goals met: imporved lipid panel, weight loss and has significantly decreased his sodium and sugar intake       Education: heart healthy eating  low sodium diet  hydration  nutrition for  lipid management  education class: Heart Healthy Eating  education class:  Label Reading  Plan: Education class: Reading Food Labels, Education Class: Heart Healthy Eating, replace butter with soft spreads made with olive oil, canola or yogurt, replace refined grain bread with whole grain bread, replace unhealthy snacks with fruits & vegs, eat fewer desserts and sweets, avoid processed foods, remove salt shaker from table, drink more water and learn how to read food labels  Readiness to change: Action:  (Changing behavior)      PSYCHOSOCIAL ASSESSMENT AND PLAN    Emotional:  Depression assessment:  PHQ-9 = 1-4 = Minimal Depression            Anxiety measure:  MARISSA-7 = 0-4  = Not anxious  Self-reported stress level:  5/10 decreased from 6/10  Social support: Excellent    Goals:  Reduce perceived stress to 1-3/10, improved [facility] QOL < 27, Physical Fitness in [facility] Score < 3, Social Support in [facility] Score < 3, Daily Activity in [facility] Score < 3, Social Activities in [place] Score < 3, Increased interest in doing things and improved sleep    Progression Toward Goals: Will continue to educate and progress as tolerated , Goals not met: Stress level still moderate at 5/10     , Goals met: increased physical and social activities, increased ability to perform  moderate to heavy ADLs and imporved [employer] Score       Education: signs/sxs of depression, benefits of a positive support system, class:  Stress and Your Health  and class:  Relaxation  Plan: Class: Stress and Your Health, Class: Relaxation, Practice relaxation techniques, Exercise and Spend time outdoors  Readiness to change: Action:  (Changing behavior)      OTHER CORE COMPONENTS     Tobacco:   Social History Tobacco Use   Smoking Status Former Smoker    Quit date:     Years since quitting: 10 6   Smokeless Tobacco Never Used       Tobacco Use Intervention:   N/A: Pt has a remote history of smoking quit   Patient has continued to abstain  Anginal Symptoms:  chest pressure and LOPEZ at time of the event   NTG use: Compliant with carrying NTG, Understands proper use and Pt has not used NTG since event    Blood pressure:    Restin/58   Exercise: 142/66   Recovery: 124/58    Goals: consistent BP < 130/80, reduced dietary sodium <2300mg, moderate intensity exercise >150 mins/wk and medication compliance    Progression Toward Goals: Will continue to educate and progress as tolerated , Goals met: consistent resting BP < 130/80, exerciseing > 150 min/week and has decreased daily sodium intake       Education:  understanding high blood pressure and it's relationship to CAD, low sodium diet and HTN, proper use of sublingual NTG, Education class:  Common Heart Medications and Education class: Understanding Heart Disease  Plan: Class: Understanding Heart Disease, Class: Common Heart Medications, avoid places with second hand smoke, Avoid Processed foods, engage in regular exercise, eliminate salt shaker at the table and monitor home BP  Readiness to change: Action:  (Changing behavior)

## 2022-08-10 ENCOUNTER — CLINICAL SUPPORT (OUTPATIENT)
Dept: CARDIAC REHAB | Facility: HOSPITAL | Age: 67
End: 2022-08-10
Payer: COMMERCIAL

## 2022-08-10 DIAGNOSIS — Z95.5 S/P CORONARY ARTERY STENT PLACEMENT: ICD-10-CM

## 2022-08-10 PROCEDURE — 93798 PHYS/QHP OP CAR RHAB W/ECG: CPT

## 2022-08-15 ENCOUNTER — CLINICAL SUPPORT (OUTPATIENT)
Dept: CARDIAC REHAB | Facility: HOSPITAL | Age: 67
End: 2022-08-15
Payer: COMMERCIAL

## 2022-08-15 DIAGNOSIS — Z95.5 S/P CORONARY ARTERY STENT PLACEMENT: ICD-10-CM

## 2022-08-15 PROCEDURE — 93798 PHYS/QHP OP CAR RHAB W/ECG: CPT

## 2022-08-17 ENCOUNTER — CLINICAL SUPPORT (OUTPATIENT)
Dept: CARDIAC REHAB | Facility: HOSPITAL | Age: 67
End: 2022-08-17
Payer: COMMERCIAL

## 2022-08-17 DIAGNOSIS — Z95.5 STENTED CORONARY ARTERY: ICD-10-CM

## 2022-08-17 PROCEDURE — 93798 PHYS/QHP OP CAR RHAB W/ECG: CPT

## 2022-08-22 ENCOUNTER — CLINICAL SUPPORT (OUTPATIENT)
Dept: CARDIAC REHAB | Facility: HOSPITAL | Age: 67
End: 2022-08-22
Payer: COMMERCIAL

## 2022-08-22 DIAGNOSIS — Z95.5 STENTED CORONARY ARTERY: ICD-10-CM

## 2022-08-22 PROCEDURE — 93798 PHYS/QHP OP CAR RHAB W/ECG: CPT

## 2022-08-24 ENCOUNTER — APPOINTMENT (OUTPATIENT)
Dept: CARDIAC REHAB | Facility: HOSPITAL | Age: 67
End: 2022-08-24
Payer: COMMERCIAL

## 2022-08-26 ENCOUNTER — CLINICAL SUPPORT (OUTPATIENT)
Dept: CARDIAC REHAB | Facility: HOSPITAL | Age: 67
End: 2022-08-26
Payer: COMMERCIAL

## 2022-08-26 DIAGNOSIS — Z95.5 STENTED CORONARY ARTERY: ICD-10-CM

## 2022-08-26 PROCEDURE — 93798 PHYS/QHP OP CAR RHAB W/ECG: CPT

## 2022-08-29 ENCOUNTER — CLINICAL SUPPORT (OUTPATIENT)
Dept: CARDIAC REHAB | Facility: HOSPITAL | Age: 67
End: 2022-08-29
Payer: COMMERCIAL

## 2022-08-29 DIAGNOSIS — Z95.5 STENTED CORONARY ARTERY: ICD-10-CM

## 2022-08-29 PROCEDURE — 93798 PHYS/QHP OP CAR RHAB W/ECG: CPT

## 2022-08-31 ENCOUNTER — CLINICAL SUPPORT (OUTPATIENT)
Dept: CARDIAC REHAB | Facility: HOSPITAL | Age: 67
End: 2022-08-31
Payer: COMMERCIAL

## 2022-08-31 DIAGNOSIS — Z95.5 STENTED CORONARY ARTERY: ICD-10-CM

## 2022-08-31 PROCEDURE — 93798 PHYS/QHP OP CAR RHAB W/ECG: CPT

## 2022-09-01 DIAGNOSIS — R07.89 OTHER CHEST PAIN: ICD-10-CM

## 2022-09-01 DIAGNOSIS — R06.09 DOE (DYSPNEA ON EXERTION): ICD-10-CM

## 2022-09-01 RX ORDER — ALPHA-D-GALACTOSIDASE 400 UNIT
TABLET ORAL
Qty: 30 TABLET | Refills: 11 | Status: SHIPPED | OUTPATIENT
Start: 2022-09-01

## 2022-09-05 ENCOUNTER — APPOINTMENT (OUTPATIENT)
Dept: CARDIAC REHAB | Facility: HOSPITAL | Age: 67
End: 2022-09-05
Payer: COMMERCIAL

## 2022-09-06 ENCOUNTER — CLINICAL SUPPORT (OUTPATIENT)
Dept: CARDIAC REHAB | Facility: HOSPITAL | Age: 67
End: 2022-09-06
Payer: COMMERCIAL

## 2022-09-06 DIAGNOSIS — Z95.5 STENTED CORONARY ARTERY: Primary | ICD-10-CM

## 2022-09-06 PROCEDURE — 93798 PHYS/QHP OP CAR RHAB W/ECG: CPT

## 2022-09-06 NOTE — PROGRESS NOTES
Cardiac Rehabilitation Plan of Care   90 Day Reassessment          Today's date: 2022   # of Exercise Sessions Completed:   -  Patient had completed one visit at [facility]  Patient name: Octaviano Siegel      : 1955  Age: 74 y o  MRN: [id number]  Referring Physician: Alison Rojas PT  Cardiologist: Delmis Naik MD  Provider: [facility]  Clinician: Dorian Wilkerson, MPT, CCRP    Dx:   Encounter Diagnosis   Name Primary?  Stented coronary artery    JOSÉ LUIS to mid LAD    Date of onset: 2022      SUMMARY OF PROGRESS:  Mary Ramirez has completed 30 exercise sessions at Cardiac Rehab s/p JOSÉ LUIS  to mid LAD  Patient admits to 100% medication compliance  Patient reports he no longer has physical limitations and has resumed all his ADls  Patient does not require supplemental O2 at this time, and does not require 02/CPAP to sleep  Patient's PHQ9 Score was two  At this time, patient denies having depression  Patient's GAD7 Score was two  At this time, patient admits to having anxiety  Currently, the patient does follow a formal exercise program at home, including walking outdoors weather permitting  Patient notes activity is limited by back issues at this time  Patient was counseled on exercise guidelines including 1-2 days of moderate exercise on opposite days of Cardiac Rehab, as tolerated  Patient has been receiving weekly education, refer to [facility]  for documentation of topics  At rest, the patient's HR was 78, O2Sat was 99%, and BP was 120/58  During exercise, the patient's peak HR was 94, while peak BP was 144/68  During his exercise sessions he is able to maintain his 02 Sat at 98%  Patient rates his program a 4 on a 1-10 RPE scale  During recovery, the patient's HR was 75, O2Sat was 98%, and BP was 124/56  Patient denies having any other symptoms during exercise  Additionally, the patient's telemetry revealed NSR  Patient had correct hemodynamic responses to activity       Patient continues to work on his goals of decrease reliance on medication, improve diet, decrease stress level, decrease risk profile and attain his maximal level of function        Patient has been very compliant attending his CR 2X weekly  Patient lives a significant distance away from CR 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  He gives great effort at each session  He has been able to work consistently at a 4 54 MET Level  He denies any symptoms  He has correct hemodynamic responses to activity  He has resumed all ADLs w/out issue  He has improved his Lipid Panel, hoping to discontinue use of a Statin  Currently his back issues are his limiting factor  He has had good results perfroming the leg press and incline board to strength BLEs and stretch bilateral hamstring/gastrocs w/good results  His program will continue to be progressed as he is able to tolerate   Patient to perform follow up testing over his next few sessions in preparastion for his transition to a Phase III Program          Medication compliance: Yes   Comments: Pt reports to be compliant with medications  Fall Risk: Low   Comments: Ambulates with a steady gait with no assist device    EKG Interpretation: NSR      EXERCISE ASSESSMENT and PLAN    Current Exercise Program in Rehab:       Frequency: 2-3 days/week Supplement with home exercise 3+ days/wk as tolerated       Minutes:45-50     METS: 4 54            HR: 20-30 > RHR   RPE: 4-6        Modalities: Treadmill, UBE, NuStep and Recumbent bike      Exercise Progression 30 Day Goals :    Frequency: 2-3 days/week of cardiac rehab     Supplement with home exercise 3+ days/wk as tolerated    Minutes: 45-50                      >150 mins/wk of moderate intensity exercise   METS: 5 to 6   HR: 20-30 > RHR    RPE: 4-6   Modalities: Treadmill, Airdyne bike, UBE, Lifecycle, NuStep and Recumbent bike    Strength trainin-3 days / week  12-15 repetitions  1-2 sets per modality    Modalities: Leg Press and UE Free Weights and incline board stretch    Home Exercise: Outdoor walking 1-2X daily     Goals: 10% improvement in functional capacity - based on max METs achieved in fitness assessment, Reduced dyspnea with physical activity  0-1/10, Increase in exercise capacity by 40% - based on peak METs tolerated in cardiac rehab exercise session, Exercise 5 days/wk, >150mins/wk of moderate intensity exercise, Resume ADLs with increased strength, Attend Rehab regularly and start a home exercise program    Progression Toward Goals: Will continue to educate and progress as tolerated , Goals met: attends CR regularly, no c/o LOPEZ, perfroms mod exercise 150 min/week, performs a HEP, regular attendance in CR, increased MET level ability and no c/o LOPEZ       Education: benefit of exercise for CAD risk factors, home exercise guidelines, AHA guidelines to achieve >150 mins/wk of moderate exercise, RPE scale and class: Risk Factors for Heart Disease   Plan:education on home exercise guidelines, home exercise 30+ mins 2 days opposite CR and Education class: Risk Factors for Heart Disease  Readiness to change: Action:  (Changing behavior)      NUTRITION ASSESSMENT AND PLAN    Weight control:    Starting weight: 161   Current weight:   150  Waist circumference:    Starting: NA   Current:  NA    Diabetes: N/A  A1c: NA    last measured: NA    Lipid management: Last lipid profile 3/23/2022  Chol 181    HDL 37     Most current: 6/23/2022;  Chol 86, Tri 88, HDL 32 and LDL 36    Goals:LDL <100, HDL >40, TRG <150, CHOL <200, Improved Rate Your Plate score  >44, reduce portion sizes of meat to 3oz or less, increase intake of fish, shellfish, cook without added fat or use vegetable oil/spray, eat 3 or more servings of whole grains a day, Eat 4-5 cups of fruits and vegetables daily, use olive or canola oil in baking, eliminate butter, daily saturated fat intake <7%/13g and seldom eat out or choose lower fat menu items    Progression Toward Goals: Pt is progressing and showing improvement  toward the following goals:  following a cardiac diet and maintaining a healthy weight     , Will continue to educate and progress as tolerated , Goals met: imporved lipid panel, weight loss and has significantly decreased his sodium and sugar intake       Patient specific goal is to discontinue use of Satins  He has had frequent Lipid Panel draws showing improvement via diet and exercise  Education: heart healthy eating  low sodium diet  hydration  nutrition for  lipid management  education class: Heart Healthy Eating  education class:  Label Reading  Plan: Education class: Reading Food Labels, Education Class: Heart Healthy Eating, replace butter with soft spreads made with olive oil, canola or yogurt, replace refined grain bread with whole grain bread, replace unhealthy snacks with fruits & vegs, eat fewer desserts and sweets, avoid processed foods, remove salt shaker from table, drink more water and learn how to read food labels  Readiness to change: Action:  (Changing behavior)      PSYCHOSOCIAL ASSESSMENT AND PLAN    Emotional:  Depression assessment:  PHQ-9 = 1-4 = Minimal Depression            Anxiety measure:  MARISSA-7 = 0-4  = Not anxious  Self-reported stress level:  3-5/10 decreased from 6/10  Social support: Excellent    Goals:  Reduce perceived stress to 1-3/10, improved [facility] QOL < 27, Physical Fitness in [facility] Score < 3, Social Support in [facility] Score < 3, Daily Activity in [facility] Score < 3, Social Activities in [place] Score < 3, Increased interest in doing things and improved sleep    Progression Toward Goals: Will continue to educate and progress as tolerated , Goals not met: Stress level still moderate at 3- 5/10     , Goals met: increased physical and social activities, increased ability to perform  moderate to heavy ADLs and imporved [employer] Score       Education: signs/sxs of depression, benefits of a positive support system, class: Stress and Your Health  and class:  Relaxation  Plan: Class: Stress and Your Health, Class: Relaxation, Practice relaxation techniques, Exercise and Spend time outdoors  Readiness to change: Action:  (Changing behavior)      OTHER CORE COMPONENTS     Tobacco:   Social History     Tobacco Use   Smoking Status Former Smoker    Quit date:     Years since quitting: 10 6   Smokeless Tobacco Never Used       Tobacco Use Intervention:   N/A: Pt has a remote history of smoking quit   Patient has continued to abstain  Anginal Symptoms:  chest pressure and LOPEZ at time of the event   NTG use: Compliant with carrying NTG, Understands proper use and Pt has not used NTG since event    Blood pressure:    Restin/58   Exercise: 144/68   Recovery: 124/56    Goals: consistent BP < 130/80, reduced dietary sodium <2300mg, moderate intensity exercise >150 mins/wk and medication compliance    Progression Toward Goals: Will continue to educate and progress as tolerated , Goals met: consistent resting BP < 130/80, medication compliance,  exerciseing > 150 min/week and has decreased daily sodium intake       Education:  understanding high blood pressure and it's relationship to CAD, low sodium diet and HTN, proper use of sublingual NTG, Education class:  Common Heart Medications and Education class: Understanding Heart Disease  Plan: Class: Understanding Heart Disease, Class: Common Heart Medications, avoid places with second hand smoke, Avoid Processed foods, engage in regular exercise, eliminate salt shaker at the table and monitor home BP  Readiness to change: Action:  (Changing behavior)

## 2022-09-07 ENCOUNTER — CLINICAL SUPPORT (OUTPATIENT)
Dept: CARDIAC REHAB | Facility: HOSPITAL | Age: 67
End: 2022-09-07
Payer: COMMERCIAL

## 2022-09-07 DIAGNOSIS — Z95.5 STENTED CORONARY ARTERY: ICD-10-CM

## 2022-09-07 PROCEDURE — 93798 PHYS/QHP OP CAR RHAB W/ECG: CPT

## 2022-09-12 ENCOUNTER — CLINICAL SUPPORT (OUTPATIENT)
Dept: CARDIAC REHAB | Facility: HOSPITAL | Age: 67
End: 2022-09-12
Payer: COMMERCIAL

## 2022-09-12 DIAGNOSIS — Z95.5 STENTED CORONARY ARTERY: ICD-10-CM

## 2022-09-12 PROCEDURE — 93798 PHYS/QHP OP CAR RHAB W/ECG: CPT

## 2022-09-13 ENCOUNTER — CLINICAL SUPPORT (OUTPATIENT)
Dept: CARDIAC REHAB | Facility: HOSPITAL | Age: 67
End: 2022-09-13
Payer: COMMERCIAL

## 2022-09-13 DIAGNOSIS — Z95.5 STENTED CORONARY ARTERY: ICD-10-CM

## 2022-09-13 PROCEDURE — 93798 PHYS/QHP OP CAR RHAB W/ECG: CPT

## 2022-09-14 ENCOUNTER — APPOINTMENT (OUTPATIENT)
Dept: CARDIAC REHAB | Facility: HOSPITAL | Age: 67
End: 2022-09-14
Payer: COMMERCIAL

## 2022-09-14 DIAGNOSIS — I25.10 ATHEROSCLEROSIS OF NATIVE CORONARY ARTERY OF NATIVE HEART WITHOUT ANGINA PECTORIS: ICD-10-CM

## 2022-09-14 RX ORDER — TICAGRELOR 90 MG/1
TABLET ORAL
Qty: 180 TABLET | Refills: 3 | Status: SHIPPED | OUTPATIENT
Start: 2022-09-14

## 2022-09-15 ENCOUNTER — APPOINTMENT (OUTPATIENT)
Dept: LAB | Facility: CLINIC | Age: 67
End: 2022-09-15
Payer: COMMERCIAL

## 2022-09-15 DIAGNOSIS — E78.2 MIXED HYPERLIPIDEMIA: ICD-10-CM

## 2022-09-15 LAB
CHOLEST SERPL-MCNC: 97 MG/DL
HDLC SERPL-MCNC: 37 MG/DL
LDLC SERPL CALC-MCNC: 45 MG/DL (ref 0–100)
TRIGL SERPL-MCNC: 75 MG/DL

## 2022-09-15 PROCEDURE — 36415 COLL VENOUS BLD VENIPUNCTURE: CPT

## 2022-09-15 PROCEDURE — 80061 LIPID PANEL: CPT

## 2022-09-16 DIAGNOSIS — E78.2 MIXED HYPERLIPIDEMIA: Primary | ICD-10-CM

## 2022-09-16 DIAGNOSIS — R07.89 OTHER CHEST PAIN: ICD-10-CM

## 2022-09-16 RX ORDER — ATORVASTATIN CALCIUM 80 MG/1
80 TABLET, FILM COATED ORAL
Qty: 90 TABLET | Refills: 3 | Status: SHIPPED | OUTPATIENT
Start: 2022-09-16 | End: 2022-09-19 | Stop reason: SDUPTHER

## 2022-09-16 NOTE — TELEPHONE ENCOUNTER
Patient Carminaantwon Keri contacting office requesting medication refill on the atorvastatin 20 mg for a 90 day supply to be sent to [facility]

## 2022-09-17 DIAGNOSIS — R07.2 PRECORDIAL CHEST PAIN: ICD-10-CM

## 2022-09-19 ENCOUNTER — APPOINTMENT (OUTPATIENT)
Dept: CARDIAC REHAB | Facility: HOSPITAL | Age: 67
End: 2022-09-19
Payer: COMMERCIAL

## 2022-09-19 RX ORDER — ISOSORBIDE MONONITRATE 30 MG/1
TABLET, EXTENDED RELEASE ORAL
Qty: 30 TABLET | Refills: 11 | Status: SHIPPED | OUTPATIENT
Start: 2022-09-19

## 2022-09-19 RX ORDER — ATORVASTATIN CALCIUM 80 MG/1
80 TABLET, FILM COATED ORAL
Qty: 90 TABLET | Refills: 3 | Status: SHIPPED | OUTPATIENT
Start: 2022-09-19 | End: 2022-09-20 | Stop reason: DRUGHIGH

## 2022-09-19 NOTE — TELEPHONE ENCOUNTER
Please send to [facility]  Patient called and medication still was not sent to them  He needs his medication   90 day supply    [phone]

## 2022-09-20 ENCOUNTER — CLINICAL SUPPORT (OUTPATIENT)
Dept: CARDIAC REHAB | Facility: HOSPITAL | Age: 67
End: 2022-09-20
Payer: COMMERCIAL

## 2022-09-20 DIAGNOSIS — Z95.5 STENTED CORONARY ARTERY: ICD-10-CM

## 2022-09-20 PROCEDURE — 93798 PHYS/QHP OP CAR RHAB W/ECG: CPT

## 2022-09-20 RX ORDER — ATORVASTATIN CALCIUM 20 MG/1
20 TABLET, FILM COATED ORAL DAILY
COMMUNITY
End: 2022-09-20 | Stop reason: SDUPTHER

## 2022-09-20 RX ORDER — ATORVASTATIN CALCIUM 20 MG/1
20 TABLET, FILM COATED ORAL DAILY
Qty: 90 TABLET | Refills: 3 | Status: SHIPPED | OUTPATIENT
Start: 2022-09-20

## 2022-09-20 NOTE — TELEPHONE ENCOUNTER
Patient Luis A Somers [phone] contacting office regarding his atorvastatin  Please correct the mg in patient's chart, the mg is 80 mg and should be 20 mg  Please refill for a 90 day supply to be sent through [facility] 20 mg atorvastatin

## 2022-09-21 ENCOUNTER — CLINICAL SUPPORT (OUTPATIENT)
Dept: CARDIAC REHAB | Facility: HOSPITAL | Age: 67
End: 2022-09-21
Payer: COMMERCIAL

## 2022-09-21 ENCOUNTER — APPOINTMENT (OUTPATIENT)
Dept: CARDIAC REHAB | Facility: HOSPITAL | Age: 67
End: 2022-09-21
Payer: COMMERCIAL

## 2022-09-21 DIAGNOSIS — Z95.5 STENTED CORONARY ARTERY: Primary | ICD-10-CM

## 2022-09-21 PROCEDURE — 93798 PHYS/QHP OP CAR RHAB W/ECG: CPT

## 2022-09-21 NOTE — PROGRESS NOTES
Cardiac Rehabilitation Plan of Care   Discharge          Today's date: 2022   # of Exercise Sessions Completed:  35/36 -  Patient had completed one visit at [facility]  Patient name: Delmis Mckenzie      : 1955  Age: 74 y o  MRN: [id number]  Referring Physician: Cole Vela PT  Cardiologist: Yadiel Alexander MD  Provider: Tae russo  Clinician: Brad Benitez, MPT, CCRP    Dx:   Encounter Diagnosis   Name Primary?  Stented coronary artery    JOSÉ LUIS to mid LAD    Date of onset: 2022      SUMMARY OF PROGRESS:  Dari Ramirez has completed 35 exercise sessions at Cardiac Rehab s/p JOSÉ LUIS  to mid LAD  Patient admits to 100% medication compliance  Patient reports he no longer has physical limitations and has resumed all his ADls  Patient does not require supplemental O2 at this time, and does not require 02/CPAP to sleep  Patient's PHQ9 Score was two  At this time, patient denies having depression  Patient's GAD7 Score was two  At this time, patient admits to having anxiety  Currently, the patient does follow a formal exercise program at home, including walking outdoors weather permitting  Patient notes activity is limited by back issues at this time  Patient was counseled on exercise guidelines including 1-2 days of moderate exercise on opposite days of Cardiac Rehab, as tolerated  Patient has been receiving weekly education, refer to [facility]  for documentation of topics  Patient has received all available education  At rest, the patient's HR was 80, O2Sat was 99%, and BP was 128/62  During exercise, the patient's peak HR was 1144, while peak BP was 158/68  During his exercise sessions he is able to maintain his 02 Sat at 98%  Patient rates his program a 3-4 on a 1-10 RPE scale  During recovery, the patient's HR was 75, O2Sat was 98%, and BP was 110/58  Patient denies having any other symptoms during exercise  Additionally, the patient's telemetry revealed NSR   Patient had correct hemodynamic responses to activity  Patient has attained his goals of decrease reliance on medication, improve diet, decrease stress level, decrease risk profile and attain his maximal level of function        Patient has been very compliant attending his CR 2X weekly  Patient lives a significant distance away from CR 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 He denies any symptoms  He has correct hemodynamic responses to activity  He has resumed all ADLs w/out issue  He has improved his Lipid Panel, hoping to discontinue use of a Statin  Currently his back issues are his limiting factor  He has had good results perfroming the leg press and incline board to strength BLEs and stretch bilateral hamstring/gastrocs w/good results  Patient has performed follow up testing  Refer to attached Outcome Summary for all noted improvements  He is pleased w/his progress in CR           Medication compliance: Yes   Comments: Pt reports to be compliant with medications  Fall Risk: Low   Comments: Ambulates with a steady gait with no assist device    EKG Interpretation: NSR      EXERCISE ASSESSMENT and PLAN    Current Exercise Program in Rehab:       Frequency: 2-3 days/week Supplement with home exercise 3- 5 days/wk as tolerated       Minutes:50-60    METS: 4 54            HR: 20-30 > RHR   RPE: 4-5        Modalities: Treadmill, UBE, NuStep and Recumbent bike      Exercise Discharge Summary   Frequency: 2-3 days/week of cardiac rehab     Supplement with home exercise 3- 5 days/wk as tolerated    Minutes: 50-60                  >150 mins/wk of moderate intensity exercise   METS: 7 5   HR: 20-30 > RHR    RPE: 3-4   Modalities: Treadmill, Airdyne bike, UBE, Lifecycle, NuStep and Recumbent bike    Strength trainin-3 days / week  12-15 repetitions  1-2 sets per modality    Modalities: Leg Press and UE Free Weights and incline board stretch    Home Exercise: Outdoor walking 1-2X daily     Goals: 10% improvement in functional capacity - based on max METs achieved in fitness assessment, Reduced dyspnea with physical activity  0-1/10, Increase in exercise capacity by 40% - based on peak METs tolerated in cardiac rehab exercise session, Exercise 5 days/wk, >150mins/wk of moderate intensity exercise, Resume ADLs with increased strength, Attend Rehab regularly and start a home exercise program    Progression Toward Goals:  Goals met: attends CR regularly, no c/o LOPEZ, perfroms mod exercise 150 min/week, performs a HEP, regular attendance in CR, increased MET level ability and no c/o LOPEZ   , Patient will be encouraged to focus on lifestyle modifications following discharge  Education: benefit of exercise for CAD risk factors, home exercise guidelines, AHA guidelines to achieve >150 mins/wk of moderate exercise, RPE scale and class: Risk Factors for Heart Disease   Plan:education on home exercise guidelines, home exercise 30+ mins 2 days opposite CR and Education class: Risk Factors for Heart Disease  Readiness to change: Action:  (Changing behavior)      NUTRITION ASSESSMENT AND PLAN    Weight control:    Starting weight: 161   Current weight:   150  Waist circumference:    Starting: NA   Current:  NA    Diabetes: N/A  A1c: NA    last measured: NA    Lipid management: Last lipid profile 3/23/2022  Chol 181    HDL 37     Most current: 6/23/2022;  Chol 86, Tri 88, HDL 32 and LDL 36    Goals:LDL <100, HDL >40, TRG <150, CHOL <200, Improved Rate Your Plate score  >44, reduce portion sizes of meat to 3oz or less, increase intake of fish, shellfish, cook without added fat or use vegetable oil/spray, eat 3 or more servings of whole grains a day, Eat 4-5 cups of fruits and vegetables daily, use olive or canola oil in baking, eliminate butter, daily saturated fat intake <7%/13g and seldom eat out or choose lower fat menu items    Progression Toward Goals: Goals met: akash lipid panel, weight loss and has significantly decreased his sodium and sugar intake   , Patient will be encouraged to focus on lifestyle modifications following discharge  Patient specific goal is to discontinue use of Satins  He has had frequent Lipid Panel draws showing improvement via diet and exercise  Education: heart healthy eating  low sodium diet  hydration  nutrition for  lipid management  education class: Heart Healthy Eating  education class:  Label Reading  Plan: Education class: Reading Food Labels, Education Class: Heart Healthy Eating, replace butter with soft spreads made with olive oil, canola or yogurt, replace refined grain bread with whole grain bread, replace unhealthy snacks with fruits & vegs, eat fewer desserts and sweets, avoid processed foods, remove salt shaker from table, drink more water and learn how to read food labels  Readiness to change: Action:  (Changing behavior)      PSYCHOSOCIAL ASSESSMENT AND PLAN    Emotional:  Depression assessment:  PHQ-9 = 1-4 = Minimal Depression            Anxiety measure:  MARISSA-7 = 0-4  = Not anxious  Self-reported stress level:  2-3/10 decreased from 5-6/10  Social support: Excellent    Goals:  Reduce perceived stress to 1-3/10, improved [facility] QOL < 27, Physical Fitness in [facility] Score < 3, Social Support in [facility] Score < 3, Daily Activity in [facility] Score < 3, Social Activities in [place] Score < 3, Increased interest in doing things and improved sleep    Progression Toward Goals: Will continue to educate and progress as tolerated , Goals met: increased physical and social activities, increased ability to perform  moderate to heavy ADLs and imporved [employer] Score and reduced stress level   , Patient will be encouraged to focus on lifestyle modifications following discharge      Education: signs/sxs of depression, benefits of a positive support system, class:  Stress and Your Health  and class:  Relaxation  Plan: Class: Stress and Your Health, Class: Relaxation, Practice relaxation techniques, Exercise and Spend time outdoors  Readiness to change: Action:  (Changing behavior)      OTHER CORE COMPONENTS     Tobacco:   Social History     Tobacco Use   Smoking Status Former Smoker    Quit date:     Years since quitting: 10 7   Smokeless Tobacco Never Used       Tobacco Use Intervention:   N/A: Pt has a remote history of smoking quit   Patient has continued to abstain  Anginal Symptoms:  chest pressure and LOPEZ at time of the event   NTG use: Compliant with carrying NTG, Understands proper use and Pt has not used NTG since event    Blood pressure:    Restin/62   Exercise: 158/68   Recovery: 110/58    Goals: consistent BP < 130/80, reduced dietary sodium <2300mg, moderate intensity exercise >150 mins/wk and medication compliance    Progression Toward Goals: Goals met: consistent resting BP < 130/80, medication compliance,  exerciseing > 150 min/week and has decreased daily sodium intake   , Patient will be encouraged to focus on lifestyle modifications following discharge      Education:  understanding high blood pressure and it's relationship to CAD, low sodium diet and HTN, proper use of sublingual NTG, Education class:  Common Heart Medications and Education class: Understanding Heart Disease  Plan: Class: Understanding Heart Disease, Class: Common Heart Medications, avoid places with second hand smoke, Avoid Processed foods, engage in regular exercise, eliminate salt shaker at the table and monitor home BP  Readiness to change: Action:  (Changing behavior)

## 2022-10-11 PROBLEM — Z00.00 MEDICARE ANNUAL WELLNESS VISIT, INITIAL: Status: RESOLVED | Noted: 2022-04-21 | Resolved: 2022-10-11

## 2023-01-19 ENCOUNTER — OFFICE VISIT (OUTPATIENT)
Dept: CARDIOLOGY CLINIC | Facility: CLINIC | Age: 68
End: 2023-01-19

## 2023-01-19 VITALS
HEIGHT: 68 IN | SYSTOLIC BLOOD PRESSURE: 118 MMHG | BODY MASS INDEX: 23.04 KG/M2 | HEART RATE: 88 BPM | OXYGEN SATURATION: 99 % | RESPIRATION RATE: 18 BRPM | WEIGHT: 152 LBS | DIASTOLIC BLOOD PRESSURE: 80 MMHG

## 2023-01-19 DIAGNOSIS — I10 PRIMARY HYPERTENSION: ICD-10-CM

## 2023-01-19 DIAGNOSIS — E78.2 MIXED HYPERLIPIDEMIA: Primary | ICD-10-CM

## 2023-01-19 PROBLEM — I25.10 CORONARY ARTERY DISEASE INVOLVING NATIVE CORONARY ARTERY OF NATIVE HEART WITHOUT ANGINA PECTORIS: Status: ACTIVE | Noted: 2023-01-19

## 2023-01-19 NOTE — PROGRESS NOTES
Cardiology Follow Up    Lindsey Olmedo III  1955  [id number]  [facility] [place]  [address] [place] [id number] [phone]    Discussion/Summary:  1  CAD s/p PCI to LAD, with residual CAD in OM  2  Dyslipidemia  3  Hypertension    He denies any symptoms  Doing well  No CP or pressure  Cont with ASA and ticagrelor  Cont with imdur and metoprolol  No symptoms  Did not tolerate the 80mg dose of lipitor  Was cut to 20 mg daily  Will recheck FLP    Discussed role of PCI versus medical management in CAD  Patient denies all complaints and prefers medical management  He will let us know about onset of cardiac symptoms    Recommend patient presents to the emergency room or call our office if he has any new/persistent or progressive symptoms  Previous studies were reviewed  Safety measures were reviewed  Questions were entertained and answered  Patient was advised to report any problems requiring medical attention  Follow-up with PCP and appropriate specialist and lab work as discussed  Return for follow up visit as scheduled or earlier, if needed  Thank you for allowing me to participate in the care and evaluation of your patient  Should you have any questions, please feel free to contact me  History of Present Illness:     Sharda Degroot is a 69 y o  male who presents for follow up for cardiovascular care  Denies chest pain, chest pressure, shortness of breath, dizziness, lightheadedness, presyncope or syncope  Denies orthopnea, PND or lower limb edema  He remains very active  [place]  Is loaded heavy loads with no symptoms  He also shovels snow without any issues  Cont on ASA and brilinta    Denies smoking, but vapes    He was seen previously by Dr Mansi Rock  I am seeing him for the first time  He had an office visit on 4/5/22   Endorsed exertional chest pain  Had a cardiac cath following a nuclear stress test  He underwent PCI to LAD  He also had an 80% OM lesion   This was medically managed    Patient Active Problem List   Diagnosis   • Other chest pain   • Primary hypertension   • LOPEZ (dyspnea on exertion)   • Nicotine dependence   • Mixed hyperlipidemia   • Coronary artery disease involving native coronary artery of native heart without angina pectoris     Past Medical History:   Diagnosis Date   • GERD (gastroesophageal reflux disease)    • Hyperlipidemia    • Hypertension      Social History     Socioeconomic History   • Marital status: Single     Spouse name: Not on file   • Number of children: Not on file   • Years of education: Not on file   • Highest education level: Not on file   Occupational History   • Not on file   Tobacco Use   • Smoking status: Former     Types: Cigarettes     Quit date:      Years since quittin 0   • Smokeless tobacco: Never   Vaping Use   • Vaping Use: Every day   • Substances: Nicotine   Substance and Sexual Activity   • Alcohol use: Not Currently     Comment: recovering alcoholic   • Drug use: Yes     Types: Marijuana     Comment: medical marijuana card   • Sexual activity: Not Currently   Other Topics Concern   • Not on file   Social History Narrative   • Not on file     Social Determinants of Health     Financial Resource Strain: Not on file   Food Insecurity: Not on file   Transportation Needs: Not on file   Physical Activity: Not on file   Stress: Not on file   Social Connections: Not on file   Intimate Partner Violence: Not on file   Housing Stability: Not on file      Family History   Problem Relation Age of Onset   • Cancer Mother    • Heart attack Father         passed at 64   • Cancer Sister      Past Surgical History:   Procedure Laterality Date   • CARDIAC CATHETERIZATION N/A 2022    Procedure: Cardiac catheterization;  Surgeon: Aden Lau MD;  Location: [address] [place] [address] CATH LAB; Service: Cardiology   • CARDIAC CATHETERIZATION Left 4/27/2022    Procedure: Cardiac Left Heart Cath;  Surgeon: Francisco Syed MD;  Location: [facility] CATH LAB; Service: Cardiology   • HERNIA REPAIR         Current Outpatient Medications:   •  atorvastatin (LIPITOR) 20 mg tablet, Take 1 tablet (20 mg total) by mouth daily, Disp: 90 tablet, Rfl: 3  •  Brilinta 90 MG, TAKE 1 TABLET EVERY 12 HOURS, Disp: 180 tablet, Rfl: 3  •  Ecotrin Low Strength 81 MG EC tablet, TAKE 1 TABLET DAILY, Disp: 30 tablet, Rfl: 11  •  isosorbide mononitrate (IMDUR) 30 mg 24 hr tablet, TAKE 1 TABLET DAILY, Disp: 30 tablet, Rfl: 11  •  metoprolol succinate (TOPROL-XL) 25 mg 24 hr tablet, TAKE 1 TABLET DAILY, Disp: 30 tablet, Rfl: 11  •  nitroglycerin (NITROSTAT) 0 4 mg SL tablet, Place 1 tablet (0 4 mg total) under the tongue every 5 (five) minutes as needed for chest pain (Patient not taking: Reported on 1/19/2023), Disp: 100 tablet, Rfl: 0  No Known Allergies    Labs:  Lab Results   Component Value Date    WBC 6 55 04/28/2022    HGB 14 3 04/28/2022    HCT 42 5 04/28/2022    MCV 91 04/28/2022     04/28/2022     Lab Results   Component Value Date    CALCIUM 9 5 06/23/2022    K 4 5 06/23/2022    CO2 27 06/23/2022     06/23/2022    BUN 17 06/23/2022    CREATININE 1 08 06/23/2022     No results found for: HGBA1C  No results found for: CHOL  Lab Results   Component Value Date    HDL 37 (L) 09/15/2022    HDL 32 (L) 06/23/2022    HDL 37 (L) 03/23/2022     Lab Results   Component Value Date    LDLCALC 45 09/15/2022    LDLCALC 36 06/23/2022    LDLCALC 118 (H) 03/23/2022     Lab Results   Component Value Date    TRIG 75 09/15/2022    TRIG 88 06/23/2022    TRIG 132 03/23/2022     No results found for: CHOLHDL    Review of Systems:  Review of Systems   Constitutional: Negative  Negative for activity change, appetite change, chills, diaphoresis, fatigue and fever     Respiratory: Negative for cough, choking, chest tightness, shortness of breath and wheezing  Cardiovascular: Negative  Negative for chest pain, palpitations and leg swelling  Gastrointestinal: Negative for nausea and vomiting  Musculoskeletal: Negative for back pain  Neurological: Negative for dizziness, syncope, weakness, light-headedness and headaches  All other systems reviewed and are negative  Physical Exam:  Physical Exam  Vitals and nursing note reviewed  Constitutional:       General: He is not in acute distress  Appearance: Normal appearance  He is normal weight  He is not ill-appearing or diaphoretic  HENT:      Head: Normocephalic and atraumatic  Eyes:      Extraocular Movements: Extraocular movements intact  Cardiovascular:      Rate and Rhythm: Normal rate and regular rhythm  Heart sounds: No murmur heard  No friction rub  No gallop  Pulmonary:      Effort: Pulmonary effort is normal  No respiratory distress  Breath sounds: Normal breath sounds  Abdominal:      General: There is no distension  Palpations: Abdomen is soft  Musculoskeletal:         General: No swelling  Normal range of motion  Skin:     General: Skin is warm and dry  Capillary Refill: Capillary refill takes less than 2 seconds  Coloration: Skin is not pale  Neurological:      General: No focal deficit present  Mental Status: He is alert and oriented to person, place, and time  Cranial Nerves: No cranial nerve deficit     Psychiatric:         Mood and Affect: Mood normal

## 2023-02-04 LAB — COLOGUARD RESULT REPORTABLE: POSITIVE

## 2023-02-06 DIAGNOSIS — R19.5 POSITIVE COLORECTAL CANCER SCREENING USING COLOGUARD TEST: Primary | ICD-10-CM

## 2023-02-09 ENCOUNTER — TELEPHONE (OUTPATIENT)
Dept: FAMILY MEDICINE CLINIC | Facility: CLINIC | Age: 68
End: 2023-02-09

## 2023-02-09 NOTE — TELEPHONE ENCOUNTER
Pt is scheduled for a phy 02/15 and is asking if Emiliana Gardner wants to add any labs prior to his appt ? ? He will be going to the lab tomorrow to get other labs done

## 2023-02-10 ENCOUNTER — APPOINTMENT (OUTPATIENT)
Dept: LAB | Facility: CLINIC | Age: 68
End: 2023-02-10

## 2023-02-10 ENCOUNTER — RA CDI HCC (OUTPATIENT)
Dept: OTHER | Facility: HOSPITAL | Age: 68
End: 2023-02-10

## 2023-02-10 LAB
CHOLEST SERPL-MCNC: 101 MG/DL
HDLC SERPL-MCNC: 46 MG/DL
LDLC SERPL CALC-MCNC: 43 MG/DL (ref 0–100)
NONHDLC SERPL-MCNC: 55 MG/DL
TRIGL SERPL-MCNC: 59 MG/DL

## 2023-02-10 NOTE — PROGRESS NOTES
[facility]  coding opportunities       Chart reviewed, no opportunity found:   [address]        Patients Insurance     Medicare Insurance: [employer] Ad[employer]age

## 2023-03-16 DIAGNOSIS — K21.9 GASTROESOPHAGEAL REFLUX DISEASE WITHOUT ESOPHAGITIS: Primary | ICD-10-CM

## 2023-03-16 DIAGNOSIS — I25.10 ATHEROSCLEROSIS OF NATIVE CORONARY ARTERY OF NATIVE HEART WITHOUT ANGINA PECTORIS: ICD-10-CM

## 2023-03-16 RX ORDER — LANSOPRAZOLE 15 MG/1
15 CAPSULE, DELAYED RELEASE ORAL DAILY
Qty: 90 CAPSULE | Refills: 3 | Status: SHIPPED | OUTPATIENT
Start: 2023-03-16 | End: 2023-03-17 | Stop reason: SDUPTHER

## 2023-03-16 NOTE — TELEPHONE ENCOUNTER
ticagrelor (Brilinta) 90 MG         Si tablet (90 mg total) every 12 (twelve) hours    Disp:  180 tablet    Refills:  3    Start: 3/16/2023    Class: Normal    For: Atherosclerosis of native coronary artery of native heart without angina pectoris    Last ordered: 6 months ago by Robina Schaffer MD     Hematology: Antiplatelets - ticagrelor Failed 2023 10:52 AM   Protocol Details  This refill cannot be delegated    HCT in normal range and within 180 days    HGB in normal range and within 180 days    PLT in normal range and within 180 days    eGFR is 60 or above and within 180 days    Valid encounter within last 6 months   [address] 2023 10:52 AM   The requested medication is not on the active medication list       To be filled at: [facility]pharm[facility] #[id number]- [place], [place] - [address]   Please review and refill if possible  Thanks!

## 2023-03-16 NOTE — TELEPHONE ENCOUNTER
Patient Elliott Ponce [phone] contacting office requesting to cancel the 90 day Brilenta refill with [facility] and to just refill the medication for 30 days

## 2023-03-17 ENCOUNTER — TELEPHONE (OUTPATIENT)
Dept: FAMILY MEDICINE CLINIC | Facility: CLINIC | Age: 68
End: 2023-03-17

## 2023-03-17 DIAGNOSIS — I25.10 ATHEROSCLEROSIS OF NATIVE CORONARY ARTERY OF NATIVE HEART WITHOUT ANGINA PECTORIS: ICD-10-CM

## 2023-03-17 DIAGNOSIS — K21.9 GASTROESOPHAGEAL REFLUX DISEASE WITHOUT ESOPHAGITIS: ICD-10-CM

## 2023-03-17 RX ORDER — LANSOPRAZOLE 30 MG/1
30 CAPSULE, DELAYED RELEASE ORAL DAILY
Qty: 90 CAPSULE | Refills: 0 | Status: SHIPPED | OUTPATIENT
Start: 2023-03-17 | End: 2023-06-15

## 2023-03-17 NOTE — TELEPHONE ENCOUNTER
Patient called and said that we sent in his Lansoprazole but he said that takes 30 mg not 15 mg which was what was sent  Can you send in 30?       [facility] 90 day supply

## 2023-03-22 NOTE — Clinical Note
Pressure dressing with quick clot applied and secured with coban. The left coronary artery was injected and visualized  Multiple views of the injected vessel were taken

## 2023-03-28 RX ORDER — LANSOPRAZOLE 30 MG/1
CAPSULE, DELAYED RELEASE ORAL
COMMUNITY
Start: 2006-01-01

## 2023-03-29 ENCOUNTER — TELEPHONE (OUTPATIENT)
Dept: CARDIOLOGY CLINIC | Facility: CLINIC | Age: 68
End: 2023-03-29

## 2023-03-29 ENCOUNTER — OFFICE VISIT (OUTPATIENT)
Dept: GASTROENTEROLOGY | Facility: CLINIC | Age: 68
End: 2023-03-29

## 2023-03-29 ENCOUNTER — TELEPHONE (OUTPATIENT)
Dept: GASTROENTEROLOGY | Facility: CLINIC | Age: 68
End: 2023-03-29

## 2023-03-29 VITALS
HEART RATE: 68 BPM | DIASTOLIC BLOOD PRESSURE: 72 MMHG | SYSTOLIC BLOOD PRESSURE: 114 MMHG | OXYGEN SATURATION: 99 % | WEIGHT: 154.4 LBS | HEIGHT: 68 IN | BODY MASS INDEX: 23.4 KG/M2

## 2023-03-29 DIAGNOSIS — R19.5 POSITIVE COLORECTAL CANCER SCREENING USING COLOGUARD TEST: ICD-10-CM

## 2023-03-29 NOTE — TELEPHONE ENCOUNTER
Gastro called & stated that pt is going for a colonoscopy on 5/30. Patient was last seen by Dr. Chadwick 1/19/23. Dr. Mora would like patient to hold his brilinta 5 days prior to procedure if ok?  Please advise if patient needs an SCLR appt.       Elisabeth can be contacted at [phone]

## 2023-03-29 NOTE — PATIENT INSTRUCTIONS
Scheduled date of colonoscopy (as of today):5/30/23  Physician performing colonoscopy:Abby  Location of colonoscopy:[place]  Bowel prep reviewed with patient:Gustavo/miralax  Instructions reviewed with patient by:Felix solorzano  Clearances:  none

## 2023-03-29 NOTE — TELEPHONE ENCOUNTER
Called Nidhi at Dr Arabella Pringle office patients Cardiologist - patient is scheduled for a Colonoscopy with Dr Jas Ware 5/30 and is prescribed  Adam Reinaldo and we are asking him to Hold for 5 days Prior to parocedure  She is putting in a message and will call back with Clearance decision

## 2023-03-29 NOTE — LETTER
March 31, 2023     Jessica Bryan, [address]  [address]    Patient: Jatin Bejarano   YOB: 1955   Date of Visit: 3/29/2023       Dear Dr Anthony Mckinnon: Thank you for referring Leta Savage to me for evaluation  Below are my notes for this consultation  If you have questions, please do not hesitate to call me  I look forward to following your patient along with you  Sincerely,        Ector Marroquin DO        CC: No Recipients  Ector Marroquin [facility]  3/29/2023  3:44 PM  Signed  [facility] - Outpatient Consultation  Leta Savage III 69 y o  male MRN: [id number]  Encounter: [id number]        ASSESSMENT AND PLAN:      1  Positive colorectal cancer screening using Cologuard test  - Ambulatory Referral to Gastroenterology  - Colonoscopy; Future    ______________________________________________________________________    HPI: This 58-year-old male comes the office today for evaluation based on a positive Cologuard test   He denies any rectal bleeding, melena, hematemesis  His last colonoscopy was performed about 15 years ago  He denies any problems with the colon prep, the anesthesia, or the actual procedure  He denies abdominal pain, nausea, vomiting, diarrhea, constipation, bloating, gaseousness, weight loss  There is no family history for colon cancer or for colon polyp  He does have occasional episodes of heartburn which are readily resolved by taking Prevacid  REVIEW OF SYSTEMS:    CONSTITUTIONAL: Denies any fever, chills, rigors, and weight loss  HEENT: No earache or tinnitus  Denies hearing loss or visual disturbances  CARDIOVASCULAR: No chest pain or palpitations  RESPIRATORY: Denies any cough, hemoptysis, shortness of breath or dyspnea on exertion  GASTROINTESTINAL: As noted in the History of Present Illness  GENITOURINARY: No problems with urination  Denies any hematuria or dysuria    NEUROLOGIC: No dizziness or vertigo, "denies headaches  MUSCULOSKELETAL: Denies any muscle or joint pain  SKIN: Denies skin rashes or itching  ENDOCRINE: Denies excessive thirst  Denies intolerance to heat or cold  PSYCHOSOCIAL: Denies depression or anxiety  Denies any recent memory loss  Historical Information   Past Medical History:   Diagnosis Date   • GERD (gastroesophageal reflux disease)    • Hyperlipidemia    • Hypertension      Past Surgical History:   Procedure Laterality Date   • CARDIAC CATHETERIZATION N/A 2022    Procedure: Cardiac catheterization;  Surgeon: Balaji Caldwell MD;  Location: [address] [place] [address] CATH LAB; Service: Cardiology   • CARDIAC CATHETERIZATION Left 2022    Procedure: Cardiac Left Heart Cath;  Surgeon: Balaji Caldwell MD;  Location: [address] [place] [address] CATH LAB; Service: Cardiology   • HERNIA REPAIR       Social History   Social History     Substance and Sexual Activity   Alcohol Use Not Currently    Comment: recovering alcoholic     Social History     Substance and Sexual Activity   Drug Use Yes   • Types: Marijuana    Comment: medical marijuana card     Social History     Tobacco Use   Smoking Status Former   • Types: Cigarettes   • Quit date:    • Years since quittin 2   Smokeless Tobacco Never     Family History   Problem Relation Age of Onset   • Cancer Mother    • Heart attack Father         passed at 64   • Cancer Sister        Meds/Allergies        Current Outpatient Medications:   •  atorvastatin (LIPITOR) 20 mg tablet  •  Ecotrin Low Strength 81 MG EC tablet  •  isosorbide mononitrate (IMDUR) 30 mg 24 hr tablet  •  lansoprazole (PREVACID) 30 mg capsule  •  metoprolol succinate (TOPROL-XL) 25 mg 24 hr tablet  •  nitroglycerin (NITROSTAT) 0 4 mg SL tablet  •  ticagrelor (Brilinta) 90 MG  •  lansoprazole (PREVACID) 30 mg capsule    No Known Allergies        Objective      Blood pressure 114/72, pulse 68, height 5' 8\" (1 727 m), weight 70 kg (154 lb 6 4 oz), SpO2 99 %   Body mass index is 23 48 " kg/m²         PHYSICAL EXAM:      General Appearance:   Alert, cooperative, no distress   HEENT:   Normocephalic, atraumatic, anicteric      Neck:  Supple, symmetrical, trachea midline   Lungs:   Clear to auscultation bilaterally; no rales, rhonchi or wheezing; respirations unlabored    Heart[de-identified]   Regular rate and rhythm; no murmur, rub, or gallop  Abdomen:   Soft, non-tender, non-distended; normal bowel sounds; no masses, no organomegaly    Genitalia:   Deferred    Rectal:   Deferred    Extremities:  No cyanosis, clubbing or edema    Pulses:  2+ and symmetric    Skin:  No jaundice, rashes, or lesions    Lymph nodes:  No palpable cervical lymphadenopathy        Lab Results:   No visits with results within 1 Day(s) from this visit  Latest known visit with results is:   Office Visit on 01/19/2023   Component Date Value   • Cholesterol 02/10/2023 101    • Triglycerides 02/10/2023 59    • HDL, Direct 02/10/2023 46    • LDL Calculated 02/10/2023 43    • Non-HDL-Chol (CHOL-HDL) 02/10/2023 55          Radiology Results:   No results found  Answers for HPI/ROS submitted by the patient on 3/28/2023  anorexia: No  arthralgias: No  belching: No  constipation: No  diarrhea: No  dysuria: No  fever: No  flatus: No  frequency: No  headaches: No  hematochezia: No  hematuria: No  melena: No  myalgias: No  nausea:  No  weight loss: No  vomiting: No

## 2023-03-29 NOTE — PROGRESS NOTES
[facility] - Outpatient Consultation  Shannon Trotter III 69 y o  male MRN: [id number]  Encounter: [id number]        ASSESSMENT AND PLAN:      1  Positive colorectal cancer screening using Cologuard test  - Ambulatory Referral to Gastroenterology  - Colonoscopy; Future    ______________________________________________________________________    HPI: This 61-year-old male comes the office today for evaluation based on a positive Cologuard test   He denies any rectal bleeding, melena, hematemesis  His last colonoscopy was performed about 15 years ago  He denies any problems with the colon prep, the anesthesia, or the actual procedure  He denies abdominal pain, nausea, vomiting, diarrhea, constipation, bloating, gaseousness, weight loss  There is no family history for colon cancer or for colon polyp  He does have occasional episodes of heartburn which are readily resolved by taking Prevacid  REVIEW OF SYSTEMS:    CONSTITUTIONAL: Denies any fever, chills, rigors, and weight loss  HEENT: No earache or tinnitus  Denies hearing loss or visual disturbances  CARDIOVASCULAR: No chest pain or palpitations  RESPIRATORY: Denies any cough, hemoptysis, shortness of breath or dyspnea on exertion  GASTROINTESTINAL: As noted in the History of Present Illness  GENITOURINARY: No problems with urination  Denies any hematuria or dysuria  NEUROLOGIC: No dizziness or vertigo, denies headaches  MUSCULOSKELETAL: Denies any muscle or joint pain  SKIN: Denies skin rashes or itching  ENDOCRINE: Denies excessive thirst  Denies intolerance to heat or cold  PSYCHOSOCIAL: Denies depression or anxiety  Denies any recent memory loss         Historical Information   Past Medical History:   Diagnosis Date   • GERD (gastroesophageal reflux disease)    • Hyperlipidemia    • Hypertension      Past Surgical History:   Procedure Laterality Date   • CARDIAC CATHETERIZATION N/A 4/27/2022    Procedure: "Cardiac catheterization;  Surgeon: Yulia Jose MD;  Location: [facility] CATH LAB; Service: Cardiology   • CARDIAC CATHETERIZATION Left 2022    Procedure: Cardiac Left Heart Cath;  Surgeon: Yulia Jose MD;  Location: [facility] CATH LAB; Service: Cardiology   • HERNIA REPAIR       Social History   Social History     Substance and Sexual Activity   Alcohol Use Not Currently    Comment: recovering alcoholic     Social History     Substance and Sexual Activity   Drug Use Yes   • Types: Marijuana    Comment: medical marijuana card     Social History     Tobacco Use   Smoking Status Former   • Types: Cigarettes   • Quit date:    • Years since quittin 2   Smokeless Tobacco Never     Family History   Problem Relation Age of Onset   • Cancer Mother    • Heart attack Father         passed at 64   • Cancer Sister        Meds/Allergies       Current Outpatient Medications:   •  atorvastatin (LIPITOR) 20 mg tablet  •  Ecotrin Low Strength 81 MG EC tablet  •  isosorbide mononitrate (IMDUR) 30 mg 24 hr tablet  •  lansoprazole (PREVACID) 30 mg capsule  •  metoprolol succinate (TOPROL-XL) 25 mg 24 hr tablet  •  nitroglycerin (NITROSTAT) 0 4 mg SL tablet  •  ticagrelor (Brilinta) 90 MG  •  lansoprazole (PREVACID) 30 mg capsule    No Known Allergies        Objective     Blood pressure 114/72, pulse 68, height 5' 8\" (1 727 m), weight 70 kg (154 lb 6 4 oz), SpO2 99 %  Body mass index is 23 48 kg/m²  PHYSICAL EXAM:      General Appearance:   Alert, cooperative, no distress   HEENT:   Normocephalic, atraumatic, anicteric      Neck:  Supple, symmetrical, trachea midline   Lungs:   Clear to auscultation bilaterally; no rales, rhonchi or wheezing; respirations unlabored    Heart[de-identified]   Regular rate and rhythm; no murmur, rub, or gallop     Abdomen:   Soft, non-tender, non-distended; normal bowel sounds; no masses, no organomegaly    Genitalia:   Deferred    Rectal:   Deferred    Extremities:  No cyanosis, " clubbing or edema    Pulses:  2+ and symmetric    Skin:  No jaundice, rashes, or lesions    Lymph nodes:  No palpable cervical lymphadenopathy        Lab Results:   No visits with results within 1 Day(s) from this visit  Latest known visit with results is:   Office Visit on 01/19/2023   Component Date Value   • Cholesterol 02/10/2023 101    • Triglycerides 02/10/2023 59    • HDL, Direct 02/10/2023 46    • LDL Calculated 02/10/2023 43    • Non-HDL-Chol (CHOL-HDL) 02/10/2023 55          Radiology Results:   No results found  Answers for HPI/ROS submitted by the patient on 3/28/2023  anorexia: No  arthralgias: No  belching: No  constipation: No  diarrhea: No  dysuria: No  fever: No  flatus: No  frequency: No  headaches: No  hematochezia: No  hematuria: No  melena: No  myalgias: No  nausea:  No  weight loss: No  vomiting: No

## 2023-04-24 ENCOUNTER — OFFICE VISIT (OUTPATIENT)
Dept: FAMILY MEDICINE CLINIC | Facility: CLINIC | Age: 68
End: 2023-04-24

## 2023-04-24 VITALS
HEIGHT: 68 IN | HEART RATE: 74 BPM | SYSTOLIC BLOOD PRESSURE: 128 MMHG | TEMPERATURE: 98.2 F | DIASTOLIC BLOOD PRESSURE: 84 MMHG | WEIGHT: 155 LBS | OXYGEN SATURATION: 98 % | BODY MASS INDEX: 23.49 KG/M2

## 2023-04-24 DIAGNOSIS — F17.290 OTHER TOBACCO PRODUCT NICOTINE DEPENDENCE, UNCOMPLICATED: ICD-10-CM

## 2023-04-24 DIAGNOSIS — Z00.00 MEDICARE ANNUAL WELLNESS VISIT, SUBSEQUENT: ICD-10-CM

## 2023-04-24 DIAGNOSIS — Z12.5 SCREENING FOR PROSTATE CANCER: ICD-10-CM

## 2023-04-24 DIAGNOSIS — I25.10 CORONARY ARTERY DISEASE INVOLVING NATIVE CORONARY ARTERY OF NATIVE HEART WITHOUT ANGINA PECTORIS: ICD-10-CM

## 2023-04-24 DIAGNOSIS — E78.2 MIXED HYPERLIPIDEMIA: ICD-10-CM

## 2023-04-24 DIAGNOSIS — I10 PRIMARY HYPERTENSION: Primary | ICD-10-CM

## 2023-04-24 PROBLEM — F17.200 NICOTINE DEPENDENCE: Status: RESOLVED | Noted: 2022-03-22 | Resolved: 2023-04-24

## 2023-04-24 NOTE — PATIENT INSTRUCTIONS
Medicare Preventive Visit Patient Instructions  Thank you for completing your Welcome to Medicare Visit or Medicare Annual Wellness Visit today  Your next wellness visit will be due in one year (4/24/2024)  The screening/preventive services that you may require over the next 5-10 years are detailed below  Some tests may not apply to you based off risk factors and/or age  Screening tests ordered at today's visit but not completed yet may show as past due  Also, please note that scanned in results may not display below  Preventive Screenings:  Service Recommendations Previous Testing/Comments   Colorectal Cancer Screening  · Colonoscopy    · Fecal Occult Blood Test (FOBT)/Fecal Immunochemical Test (FIT)  · Fecal DNA/Cologuard Test  · Flexible Sigmoidoscopy Age: 39-70 years old   Colonoscopy: every 10 years (May be performed more frequently if at higher risk)  OR  FOBT/FIT: every 1 year  OR  Cologuard: every 3 years  OR  Sigmoidoscopy: every 5 years  Screening may be recommended earlier than age 39 if at higher risk for colorectal cancer  Also, an individualized decision between you and your healthcare provider will decide whether screening between the ages of 74-80 would be appropriate   Colonoscopy: 04/08/2019  FOBT/FIT: Not on file  Cologuard: 01/30/2023  Sigmoidoscopy: Not on file    Screening Current     Prostate Cancer Screening Individualized decision between patient and health care provider in men between ages of 53-78   Medicare will cover every 12 months beginning on the day after your 50th birthday PSA: 0 8 ng/mL     Screening Current     Hepatitis C Screening Once for adults born between [facility]  More frequently in patients at high risk for Hepatitis C Hep C Antibody: Not on file        Diabetes Screening 1-2 times per year if you're at risk for diabetes or have pre-diabetes Fasting glucose: 111 mg/dL (6/23/2022)  A1C: No results in last 5 years (No results in last 5 years)  Screening Current Cholesterol Screening Once every 5 years if you don't have a lipid disorder  May order more often based on risk factors  Lipid panel: 02/10/2023  Screening Not Indicated  History Lipid Disorder      Other Preventive Screenings Covered by Medicare:  1  Abdominal Aortic Aneurysm (AAA) Screening: covered once if your at risk  You're considered to be at risk if you have a family history of AAA or a male between the age of 73-68 who smoking at least 100 cigarettes in your lifetime  2  Lung Cancer Screening: covers low dose CT scan once per year if you meet all of the following conditions: (1) Age 50-69; (2) No signs or symptoms of lung cancer; (3) Current smoker or have quit smoking within the last 15 years; (4) You have a tobacco smoking history of at least 20 pack years (packs per day x number of years you smoked); (5) You get a written order from a healthcare provider  3  Glaucoma Screening: covered annually if you're considered high risk: (1) You have diabetes OR (2) Family history of glaucoma OR (3)  aged 48 and older OR (3)  American aged 72 and older  3  Osteoporosis Screening: covered every 2 years if you meet one of the following conditions: (1) Have a vertebral abnormality; (2) On glucocorticoid therapy for more than 3 months; (3) Have primary hyperparathyroidism; (4) On osteoporosis medications and need to assess response to drug therapy  5  HIV Screening: covered annually if you're between the age of 12-76  Also covered annually if you are younger than 13 and older than 72 with risk factors for HIV infection  For pregnant patients, it is covered up to 3 times per pregnancy      Immunizations:  Immunization Recommendations   Influenza Vaccine Annual influenza vaccination during flu season is recommended for all persons aged >= 6 months who do not have contraindications   Pneumococcal Vaccine   * Pneumococcal conjugate vaccine = PCV13 (Prevnar 13), PCV15 (Vaxneuvance), PCV20 (Prevnar 20)  * Pneumococcal polysaccharide vaccine = PPSV23 (Pneumovax) Adults 2364 years old: 1-3 doses may be recommended based on certain risk factors  Adults 72 years old: 1-2 doses may be recommended based off what pneumonia vaccine you previously received   Hepatitis B Vaccine 3 dose series if at intermediate or high risk (ex: diabetes, end stage renal disease, liver disease)   Tetanus (Td) Vaccine - COST NOT COVERED BY MEDICARE PART B Following completion of primary series, a booster dose should be given every 10 years to maintain immunity against tetanus  Td may also be given as tetanus wound prophylaxis  Tdap Vaccine - COST NOT COVERED BY MEDICARE PART B Recommended at least once for all adults  For pregnant patients, recommended with each pregnancy  Shingles Vaccine (Shingrix) - COST NOT COVERED BY MEDICARE PART B  2 shot series recommended in those aged 48 and above     Health Maintenance Due:      Topic Date Due   • Hepatitis C Screening  Never done   • Colorectal Cancer Screening  01/30/2026     Immunizations Due:      Topic Date Due   • Hepatitis A Vaccine (1 of 2 - Risk 2-dose series) Never done   • Hepatitis B Vaccine (1 of 3 - Risk 3-dose series) Never done   • Pneumococcal Vaccine: 65+ Years (1 - PCV) Never done   • COVID-19 Vaccine (2 - Booster for [place] series) 06/29/2021   • Influenza Vaccine (1) Never done     Advance Directives   What are advance directives? Advance directives are legal documents that state your wishes and plans for medical care  These plans are made ahead of time in case you lose your ability to make decisions for yourself  Advance directives can apply to any medical decision, such as the treatments you want, and if you want to donate organs  What are the types of advance directives? There are many types of advance directives, and each state has rules about how to use them  You may choose a combination of any of the following:  · Living will:   This is a written record of the treatment you want  You can also choose which treatments you do not want, which to limit, and which to stop at a certain time  This includes surgery, medicine, IV fluid, and tube feedings  · Durable power of  for healthcare [facility]): This is a written record that states who you want to make healthcare choices for you when you are unable to make them for yourself  This person, called a proxy, is usually a family member or a friend  You may choose more than 1 proxy  · Do not resuscitate (DNR) order:  A DNR order is used in case your heart stops beating or you stop breathing  It is a request not to have certain forms of treatment, such as CPR  A DNR order may be included in other types of advance directives  · Medical directive: This covers the care that you want if you are in a coma, near death, or unable to make decisions for yourself  You can list the treatments you want for each condition  Treatment may include pain medicine, surgery, blood transfusions, dialysis, IV or tube feedings, and a ventilator (breathing machine)  · Values history: This document has questions about your views, beliefs, and how you feel and think about life  This information can help others choose the care that you would choose  Why are advance directives important? An advance directive helps you control your care  Although spoken wishes may be used, it is better to have your wishes written down  Spoken wishes can be misunderstood, or not followed  Treatments may be given even if you do not want them  An advance directive may make it easier for your family to make difficult choices about your care  © Copyright [employer] 2018 Information is for End User's use only and may not be sold, redistributed or otherwise used for commercial purposes   All illustrations and images included in CareNotes® are the copyrighted property of [employer]  or [employer]

## 2023-05-01 ENCOUNTER — OFFICE VISIT (OUTPATIENT)
Dept: CARDIOLOGY CLINIC | Facility: CLINIC | Age: 68
End: 2023-05-01

## 2023-05-01 VITALS
WEIGHT: 154 LBS | HEIGHT: 68 IN | DIASTOLIC BLOOD PRESSURE: 62 MMHG | BODY MASS INDEX: 23.34 KG/M2 | SYSTOLIC BLOOD PRESSURE: 106 MMHG | OXYGEN SATURATION: 99 % | HEART RATE: 68 BPM | RESPIRATION RATE: 20 BRPM

## 2023-05-01 DIAGNOSIS — R07.2 PRECORDIAL CHEST PAIN: ICD-10-CM

## 2023-05-01 DIAGNOSIS — E78.2 MIXED HYPERLIPIDEMIA: ICD-10-CM

## 2023-05-01 DIAGNOSIS — I10 PRIMARY HYPERTENSION: ICD-10-CM

## 2023-05-01 DIAGNOSIS — R07.89 OTHER CHEST PAIN: ICD-10-CM

## 2023-05-01 DIAGNOSIS — I25.10 CORONARY ARTERY DISEASE INVOLVING NATIVE CORONARY ARTERY OF NATIVE HEART WITHOUT ANGINA PECTORIS: Primary | ICD-10-CM

## 2023-05-01 DIAGNOSIS — R06.09 DOE (DYSPNEA ON EXERTION): ICD-10-CM

## 2023-05-01 RX ORDER — METOPROLOL SUCCINATE 25 MG/1
25 TABLET, EXTENDED RELEASE ORAL DAILY
Qty: 90 TABLET | Refills: 3 | Status: SHIPPED | OUTPATIENT
Start: 2023-05-01

## 2023-05-01 RX ORDER — ISOSORBIDE MONONITRATE 30 MG/1
30 TABLET, EXTENDED RELEASE ORAL DAILY
Qty: 90 TABLET | Refills: 2 | Status: SHIPPED | OUTPATIENT
Start: 2023-05-01

## 2023-05-01 RX ORDER — ASPIRIN 81 MG/1
81 TABLET ORAL DAILY
Qty: 90 TABLET | Refills: 3 | Status: SHIPPED | OUTPATIENT
Start: 2023-05-01

## 2023-05-01 RX ORDER — ATORVASTATIN CALCIUM 40 MG/1
40 TABLET, FILM COATED ORAL DAILY
Qty: 90 TABLET | Refills: 3 | Status: SHIPPED | OUTPATIENT
Start: 2023-05-01

## 2023-05-01 NOTE — PROGRESS NOTES
Cardiology Follow Up    Emiliana Delgado III  1955  [id number]  [facility] [place] [id number] [place] [address] [phone]    Discussion/Summary:  1  CAD s/p PCI to lAD and residual CAD in OM - on medical management  2  Hypertension  3  Dyslipidemia    Completely denies all complaints  Doing well  Cont with ASA, statin, metoprolol and imdur  Will increase statin to 40 mg daily  He has had multiple discussion with regard to management of residual CAD, wants medical management  Does not want intervention  States he is asymptomatic  Discussed role of DAPT  Patient wants to stop ticagrelor as it is more than one year since his PCI  He is concerned about bleeding risk  Will stop ticagrelor  Cont with ASA  Remains active  He will inform us about onset of cardiac symptoms  Previous studies were reviewed  Safety measures were reviewed  Questions were entertained and answered  Patient was advised to report any problems requiring medical attention  Follow-up with PCP and appropriate specialist and lab work as discussed  Return for follow up visit as scheduled or earlier, if needed  Thank you for allowing me to participate in the care and evaluation of your patient  Should you have any questions, please feel free to contact me  History of Present Illness:     Keith Lozano is a 69 y o  male who presents for follow up for cardiovascular care  Denies chest pain, chest pressure, shortness of breath, dizziness, lightheadedness, presyncope or syncope  Denies orthopnea, PND or lower limb edema  He is very active  He is chopping wood  Has no exertional complaints  Patient underwent cardiac catheterization last year  Underwent PCI to LAD  He was noted to have obtuse marginal stenosis  Following discussions with patient    He prefers medical management for residual CAD  He denies all complaints  Cardiac cath :  Mid LAD 99 % stenosis status post successful PCI with drug-eluting stent x1  Proximal LAD 40 percent stenosis  Proximal circumflex discrete 40 percent stenosis  OM1 is medium to large size long vessel with 80% percent stenosis in mid  Proximal RCA discrete 40 percent stenosis      Patient Active Problem List   Diagnosis    Other chest pain    Primary hypertension    LOPEZ (dyspnea on exertion)    Mixed hyperlipidemia    Coronary artery disease involving native coronary artery of native heart without angina pectoris     Past Medical History:   Diagnosis Date    GERD (gastroesophageal reflux disease)     Hyperlipidemia     Hypertension      Social History     Socioeconomic History    Marital status: Single     Spouse name: Not on file    Number of children: Not on file    Years of education: Not on file    Highest education level: Not on file   Occupational History    Not on file   Tobacco Use    Smoking status: Former     Types: Cigarettes     Quit date:      Years since quittin 3    Smokeless tobacco: Never   Vaping Use    Vaping Use: Every day    Substances: Nicotine   Substance and Sexual Activity    Alcohol use: Not Currently     Comment: recovering alcoholic    Drug use: Yes     Types: Marijuana     Comment: medical marijuana card    Sexual activity: Not Currently   Other Topics Concern    Not on file   Social History Narrative    Not on file     Social Determinants of Health     Financial Resource Strain: Medium Risk    Difficulty of Paying Living Expenses: Somewhat hard   Food Insecurity: Not on file   Transportation Needs: No Transportation Needs    Lack of Transportation (Medical): No    Lack of Transportation (Non-Medical):  No   Physical Activity: Not on file   Stress: Not on file   Social Connections: Not on file   Intimate Partner Violence: Not on file   Housing Stability: Not on file      Family History Problem Relation Age of Onset    Cancer Mother     Heart attack Father         passed at 64    Cancer Sister      Past Surgical History:   Procedure Laterality Date    CARDIAC CATHETERIZATION N/A 4/27/2022    Procedure: Cardiac catheterization;  Surgeon: Isael Perry MD;  [facility]: [facility] CATH LAB; Service: Cardiology    CARDIAC CATHETERIZATION Left 4/27/2022    Procedure: Cardiac Left Heart Cath;  Surgeon: Isael Perry MD;  Location: [facility] CATH LAB;   Service: Cardiology    HERNIA REPAIR         Current Outpatient Medications:     atorvastatin (LIPITOR) 20 mg tablet, Take 1 tablet (20 mg total) by mouth daily, Disp: 90 tablet, Rfl: 3    Ecotrin Low Strength 81 MG EC tablet, TAKE 1 TABLET DAILY, Disp: 30 tablet, Rfl: 11    isosorbide mononitrate (IMDUR) 30 mg 24 hr tablet, TAKE 1 TABLET DAILY, Disp: 30 tablet, Rfl: 11    lansoprazole (PREVACID) 30 mg capsule, Take 1 capsule (30 mg total) by mouth daily, Disp: 90 capsule, Rfl: 0    metoprolol succinate (TOPROL-XL) 25 mg 24 hr tablet, TAKE 1 TABLET DAILY, Disp: 30 tablet, Rfl: 11    ticagrelor (Brilinta) 90 MG, Take 1 tablet (90 mg total) by mouth every 12 (twelve) hours, Disp: 60 tablet, Rfl: 0    nitroglycerin (NITROSTAT) 0 4 mg SL tablet, Place 1 tablet (0 4 mg total) under the tongue every 5 (five) minutes as needed for chest pain (Patient not taking: Reported on 5/1/2023), Disp: 100 tablet, Rfl: 0  No Known Allergies    Labs:  Lab Results   Component Value Date    WBC 6 55 04/28/2022    HGB 14 3 04/28/2022    HCT 42 5 04/28/2022    MCV 91 04/28/2022     04/28/2022     Lab Results   Component Value Date    CALCIUM 9 5 06/23/2022    K 4 5 06/23/2022    CO2 27 06/23/2022     06/23/2022    BUN 17 06/23/2022    CREATININE 1 08 06/23/2022     No results found for: HGBA1C  No results found for: CHOL  Lab Results   Component Value Date    HDL 46 02/10/2023    HDL 37 (L) 09/15/2022    HDL 32 (L) 06/23/2022     Lab Results Component Value Date    LDLCALC 43 02/10/2023    LDLCALC 45 09/15/2022    LDLCALC 36 06/23/2022     Lab Results   Component Value Date    TRIG 59 02/10/2023    TRIG 75 09/15/2022    TRIG 88 06/23/2022     No results found for: CHOLHDL    Review of Systems:  Review of Systems   Constitutional: Negative  Negative for activity change, appetite change, chills, fatigue and fever  Respiratory: Negative for chest tightness and shortness of breath  Cardiovascular: Negative for chest pain, palpitations and leg swelling  Gastrointestinal: Negative for nausea and vomiting  Musculoskeletal: Negative for arthralgias and back pain  Neurological: Negative for dizziness, syncope, speech difficulty, weakness and light-headedness  Hematological: Negative for adenopathy  Psychiatric/Behavioral: Negative for agitation and behavioral problems  All other systems reviewed and are negative  Physical Exam:  Physical Exam  Vitals and nursing note reviewed  Constitutional:       General: He is not in acute distress  Appearance: Normal appearance  He is not ill-appearing or diaphoretic  HENT:      Head: Normocephalic and atraumatic  Eyes:      Extraocular Movements: Extraocular movements intact  Cardiovascular:      Rate and Rhythm: Normal rate and regular rhythm  Heart sounds: No murmur heard  No friction rub  No gallop  Pulmonary:      Effort: Pulmonary effort is normal  No respiratory distress  Breath sounds: Normal breath sounds  Abdominal:      General: There is no distension  Palpations: Abdomen is soft  Musculoskeletal:         General: No swelling  Normal range of motion  Cervical back: Normal range of motion  No rigidity  Skin:     General: Skin is warm and dry  Capillary Refill: Capillary refill takes less than 2 seconds  Coloration: Skin is not pale  Neurological:      General: No focal deficit present        Mental Status: He is alert and oriented to person, place, and time  Cranial Nerves: No cranial nerve deficit     Psychiatric:         Mood and Affect: Mood normal          Behavior: Behavior normal

## 2023-05-22 ENCOUNTER — TELEPHONE (OUTPATIENT)
Dept: CARDIOLOGY CLINIC | Facility: CLINIC | Age: 68
End: 2023-05-22

## 2023-05-22 ENCOUNTER — TELEPHONE (OUTPATIENT)
Dept: GASTROENTEROLOGY | Facility: CLINIC | Age: 68
End: 2023-05-22

## 2023-05-22 NOTE — TELEPHONE ENCOUNTER
Please forward my last office note    Also, patient should continue ASA periprocedure, if okay from GI standpoint    Thanks

## 2023-05-22 NOTE — TELEPHONE ENCOUNTER
Nicole, from GI called & stated that pt will be going for a colonoscopy on 5/30 and needs instructions on when to hold his Brulinta.     Please please update in chart.     Nicole can be reached at [phone]

## 2023-05-22 NOTE — TELEPHONE ENCOUNTER
Called and spoke to [place]  she is sending a message for clearance to have patient hold his Brillanta for 5 days prior to his Colonoscopy - she stated they will put a letter in pts chart

## 2023-05-22 NOTE — TELEPHONE ENCOUNTER
Nicole called back, no need to fax OV, Dr. Mora will review from [facility], relayed instructions as per Dr. Chadwick

## 2023-05-22 NOTE — TELEPHONE ENCOUNTER
Patients cardiologist called - patient is no longer taking Saddie Patsy  Only baby Aspirin as of 5/1/23 OV any questions to please refer to his office notes    Colonoscopy  5/30/2023

## 2023-05-23 ENCOUNTER — TELEPHONE (OUTPATIENT)
Dept: GASTROENTEROLOGY | Facility: CLINIC | Age: 68
End: 2023-05-23

## 2023-05-23 NOTE — TELEPHONE ENCOUNTER
Patients GI provider:  Dr Mark Ashby    Number to return call: [phone]    Reason for call: Pt calling to r/s procedure for 5/30 w/ Dr Mark Ashby  Pt aware call center can not schedule past Aug at this time   Please call pt to r/s    Scheduled procedure/appointment date if applicable: procedure  0/28

## 2023-06-01 DIAGNOSIS — K21.9 GASTROESOPHAGEAL REFLUX DISEASE WITHOUT ESOPHAGITIS: ICD-10-CM

## 2023-06-01 RX ORDER — LANSOPRAZOLE 30 MG/1
CAPSULE, DELAYED RELEASE ORAL
Qty: 90 CAPSULE | Refills: 3 | Status: SHIPPED | OUTPATIENT
Start: 2023-06-01

## 2023-06-02 NOTE — TELEPHONE ENCOUNTER
Called and reschled [facility]'s procedure - pt has prep Instructions - NO blood thinners    Scheduled date of colonoscopy (as of today):8/16/23  Physician performing colonoscopy:Abby  Location of colonoscopy:[facility]  Bowel prep reviewed with patient:Gustavo/Miralax  Instructions reviewed with patient by:Felix solorzano  Clearances:  none

## 2023-08-15 ENCOUNTER — TELEPHONE (OUTPATIENT)
Age: 68
End: 2023-08-15

## 2023-08-15 NOTE — TELEPHONE ENCOUNTER
Pt called back to reschedule. Pt did not realize that he scheduled a day after his birthday and he is having a big 68th celebration and pt wants to eat.  Rescheduled Colonoscopy for 10/10/23 with Dr. Brigid Cook @ [facility].

## 2023-08-15 NOTE — TELEPHONE ENCOUNTER
Rescheduled Colonoscopy to 10/05/2023 with Dr. Jw Jewell @ [facility]. Verified patient has miralax/dulcolax procedure instructions.

## 2023-10-10 ENCOUNTER — ANESTHESIA EVENT (OUTPATIENT)
Dept: GASTROENTEROLOGY | Facility: HOSPITAL | Age: 68
End: 2023-10-10

## 2023-10-10 ENCOUNTER — ANESTHESIA (OUTPATIENT)
Dept: GASTROENTEROLOGY | Facility: HOSPITAL | Age: 68
End: 2023-10-10

## 2023-10-10 ENCOUNTER — HOSPITAL ENCOUNTER (OUTPATIENT)
Dept: GASTROENTEROLOGY | Facility: HOSPITAL | Age: 68
Setting detail: OUTPATIENT SURGERY
Discharge: HOME | End: 2023-10-10
Attending: INTERNAL MEDICINE
Payer: COMMERCIAL

## 2023-10-10 VITALS
SYSTOLIC BLOOD PRESSURE: 106 MMHG | HEART RATE: 66 BPM | DIASTOLIC BLOOD PRESSURE: 59 MMHG | HEIGHT: 68 IN | RESPIRATION RATE: 16 BRPM | TEMPERATURE: 98.5 F | BODY MASS INDEX: 24.52 KG/M2 | WEIGHT: 161.82 LBS | OXYGEN SATURATION: 99 %

## 2023-10-10 DIAGNOSIS — R19.5 POSITIVE COLORECTAL CANCER SCREENING USING COLOGUARD TEST: ICD-10-CM

## 2023-10-10 PROCEDURE — 88305 TISSUE EXAM BY PATHOLOGIST: CPT | Performed by: PATHOLOGY

## 2023-10-10 RX ORDER — PROPOFOL 10 MG/ML
INJECTION, EMULSION INTRAVENOUS AS NEEDED
Status: DISCONTINUED | OUTPATIENT
Start: 2023-10-10 | End: 2023-10-10

## 2023-10-10 RX ORDER — LIDOCAINE HYDROCHLORIDE 20 MG/ML
INJECTION, SOLUTION EPIDURAL; INFILTRATION; INTRACAUDAL; PERINEURAL AS NEEDED
Status: DISCONTINUED | OUTPATIENT
Start: 2023-10-10 | End: 2023-10-10

## 2023-10-10 RX ORDER — PHENYLEPHRINE HCL IN 0.9% NACL 1 MG/10 ML
SYRINGE (ML) INTRAVENOUS AS NEEDED
Status: DISCONTINUED | OUTPATIENT
Start: 2023-10-10 | End: 2023-10-10

## 2023-10-10 RX ORDER — SODIUM CHLORIDE, SODIUM LACTATE, POTASSIUM CHLORIDE, CALCIUM CHLORIDE 600; 310; 30; 20 MG/100ML; MG/100ML; MG/100ML; MG/100ML
INJECTION, SOLUTION INTRAVENOUS CONTINUOUS PRN
Status: DISCONTINUED | OUTPATIENT
Start: 2023-10-10 | End: 2023-10-10

## 2023-10-10 RX ADMIN — PROPOFOL 75 MG: 10 INJECTION, EMULSION INTRAVENOUS at 10:29

## 2023-10-10 RX ADMIN — LIDOCAINE HYDROCHLORIDE 100 MG: 20 INJECTION, SOLUTION EPIDURAL; INFILTRATION; INTRACAUDAL at 10:27

## 2023-10-10 RX ADMIN — PROPOFOL 25 MG: 10 INJECTION, EMULSION INTRAVENOUS at 10:34

## 2023-10-10 RX ADMIN — PROPOFOL 50 MG: 10 INJECTION, EMULSION INTRAVENOUS at 10:31

## 2023-10-10 RX ADMIN — PROPOFOL 75 MG: 10 INJECTION, EMULSION INTRAVENOUS at 10:28

## 2023-10-10 RX ADMIN — PROPOFOL 25 MG: 10 INJECTION, EMULSION INTRAVENOUS at 10:40

## 2023-10-10 RX ADMIN — PROPOFOL 25 MG: 10 INJECTION, EMULSION INTRAVENOUS at 10:37

## 2023-10-10 RX ADMIN — Medication 100 MCG: at 10:35

## 2023-10-10 RX ADMIN — SODIUM CHLORIDE, SODIUM LACTATE, POTASSIUM CHLORIDE, AND CALCIUM CHLORIDE: .6; .31; .03; .02 INJECTION, SOLUTION INTRAVENOUS at 08:40

## 2023-10-10 NOTE — H&P
History and Physical - [facility]  Leslee Ta III 77 y.o. male MRN: [id number]      HPI: Lorenzo Campbell is a 77y.o. year old male who presents for evaluation of a positive Cologuard test      REVIEW OF SYSTEMS: Per the HPI, and otherwise unremarkable. Historical Information   Past Medical History:   Diagnosis Date   • GERD (gastroesophageal reflux disease)    • Hyperlipidemia    • Hypertension      Past Surgical History:   Procedure Laterality Date   • CARDIAC CATHETERIZATION N/A 2022    Procedure: Cardiac catheterization;  Surgeon: Real Scherer MD;  Location: [address] CATH LAB; Service: Cardiology   • CARDIAC CATHETERIZATION Left 2022    Procedure: Cardiac Left Heart Cath;  Surgeon: Real Scherer MD;  Location: [address] CATH LAB; Service: Cardiology   • COLONOSCOPY N/A     polyps removed   • HERNIA REPAIR       Social History   Social History     Substance and Sexual Activity   Alcohol Use Not Currently    Comment: recovering alcoholic     Social History     Substance and Sexual Activity   Drug Use Yes   • Types: Marijuana    Comment: medical marijuana card     Social History     Tobacco Use   Smoking Status Former   • Types: Cigarettes   • Quit date:    • Years since quittin.7   Smokeless Tobacco Never     Family History   Problem Relation Age of Onset   • Cancer Mother    • Heart attack Father         passed at 64   • Cancer Sister        Meds/Allergies     (Not in a hospital admission)      No Known Allergies    Objective     Blood pressure 146/75, pulse 80, temperature 98.5 °F (36.9 °C), temperature source Temporal, resp. rate 12, height 5' 8" (1.727 m), weight 73.4 kg (161 lb 13.1 oz), SpO2 94 %. PHYSICAL EXAM    Gen: NAD  CV: RRR  CHEST: Clear  ABD: soft, NT/ND  EXT: no edema      ASSESSMENT/PLAN:  This is a 77y.o. year old male here for colonoscopy, and he is stable and optimized for his procedure.

## 2023-10-10 NOTE — ANESTHESIA POSTPROCEDURE EVALUATION
Post-Op Assessment Note    CV Status:  Stable  Pain Score: 0    Pain management: adequate     Mental Status:  Alert and awake   Hydration Status:  Euvolemic   PONV Controlled:  Controlled   Airway Patency:  Patent      Post Op Vitals Reviewed: Yes      Staff: CRNA         No notable events documented.     BP (!) 99/49 (10/10/23 1046)    Temp      Pulse 71 (10/10/23 1046)   Resp 16 (10/10/23 1046)    SpO2 98 % (10/10/23 1046)

## 2023-10-10 NOTE — ANESTHESIA PREPROCEDURE EVALUATION
Procedure:  COLONOSCOPY    Relevant Problems   CARDIO   (+) Coronary artery disease involving native coronary artery of native heart without angina pectoris   (+) LOPEZ (dyspnea on exertion)   (+) Mixed hyperlipidemia   (+) Other chest pain   (+) Primary hypertension      PULMONARY   (+) LOPEZ (dyspnea on exertion)        Stress ECG: No ST deviation is noted. There were no arrhythmias during recovery. . The ECG was not diagnostic due to pharmacological (vasodilator) stress. •  Perfusion: There is a left ventricular perfusion defect that is small in size with mild reduction in uptake present in the anteroseptal and apicalseptal location(s) that is reversible. •  Stress Function: Left ventricular function post-stress is normal. Post-stress ejection fraction is 63 %. •  Stress Combined Conclusion: Left ventricular perfusion is probably abnormal.      H/o IVDA (remote)      Physical Exam    Airway    Mallampati score: II  TM Distance: >3 FB  Neck ROM: full     Dental       Cardiovascular  Cardiovascular exam normal    Pulmonary  Pulmonary exam normal     Other Findings        Anesthesia Plan  ASA Score- 3     Anesthesia Type- IV sedation with anesthesia with ASA Monitors. Additional Monitors:   Airway Plan:           Plan Factors-Exercise tolerance (METS): >4 METS. Chart reviewed. EKG reviewed. Imaging results reviewed. Existing labs reviewed. Patient summary reviewed. Induction- intravenous. Postoperative Plan-     Informed Consent- Anesthetic plan and risks discussed with patient. I personally reviewed this patient with the CRNA. Discussed and agreed on the Anesthesia Plan with the CRNA. Severo English

## 2023-10-13 PROCEDURE — 88305 TISSUE EXAM BY PATHOLOGIST: CPT | Performed by: PATHOLOGY

## 2023-10-18 ENCOUNTER — TELEPHONE (OUTPATIENT)
Dept: GASTROENTEROLOGY | Facility: CLINIC | Age: 68
End: 2023-10-18

## 2023-10-18 NOTE — TELEPHONE ENCOUNTER
----- Message from Julia Tidwell DO sent at 10/18/2023  7:17 AM EDT -----  Please call this patient with the polyp result. The polyp was a precancerous adenoma that was completely removed. His next colonoscopy is due in 7 years.

## 2024-01-12 DIAGNOSIS — R07.2 PRECORDIAL CHEST PAIN: ICD-10-CM

## 2024-01-12 RX ORDER — ISOSORBIDE MONONITRATE 30 MG/1
30 TABLET, EXTENDED RELEASE ORAL DAILY
Qty: 90 TABLET | Refills: 3 | Status: SHIPPED | OUTPATIENT
Start: 2024-01-12

## 2024-03-13 ENCOUNTER — TELEPHONE (OUTPATIENT)
Dept: CARDIOLOGY CLINIC | Facility: CLINIC | Age: 69
End: 2024-03-13

## 2024-03-13 DIAGNOSIS — I25.10 CORONARY ARTERY DISEASE INVOLVING NATIVE CORONARY ARTERY OF NATIVE HEART WITHOUT ANGINA PECTORIS: Primary | ICD-10-CM

## 2024-03-13 DIAGNOSIS — E78.2 MIXED HYPERLIPIDEMIA: ICD-10-CM

## 2024-03-13 NOTE — TELEPHONE ENCOUNTER
Pt requesting lab orders prior to office visit.   Last lipid, CBC, CMP more than one year ago .   Can we place these labs for pt?

## 2024-03-13 NOTE — TELEPHONE ENCOUNTER
Patient is seeing Dr. Chadwick on April 4th. He would like to know if an order can be placed for him to have blood work done before his appt.     [phone]

## 2024-03-27 ENCOUNTER — APPOINTMENT (OUTPATIENT)
Dept: LAB | Facility: CLINIC | Age: 69
End: 2024-03-27
Payer: COMMERCIAL

## 2024-03-27 DIAGNOSIS — Z12.5 SCREENING FOR PROSTATE CANCER: ICD-10-CM

## 2024-03-27 DIAGNOSIS — I25.10 CORONARY ARTERY DISEASE INVOLVING NATIVE CORONARY ARTERY OF NATIVE HEART WITHOUT ANGINA PECTORIS: ICD-10-CM

## 2024-03-27 DIAGNOSIS — E78.2 MIXED HYPERLIPIDEMIA: ICD-10-CM

## 2024-03-27 DIAGNOSIS — I10 PRIMARY HYPERTENSION: ICD-10-CM

## 2024-03-27 LAB
ALBUMIN SERPL BCP-MCNC: 4 G/DL (ref 3.5–5)
ALP SERPL-CCNC: 69 U/L (ref 34–104)
ALT SERPL W P-5'-P-CCNC: 16 U/L (ref 7–52)
ANION GAP SERPL CALCULATED.3IONS-SCNC: 6 MMOL/L (ref 4–13)
AST SERPL W P-5'-P-CCNC: 20 U/L (ref 13–39)
BASOPHILS # BLD AUTO: 0.03 THOUSANDS/ÂΜL (ref 0–0.1)
BASOPHILS NFR BLD AUTO: 1 % (ref 0–1)
BILIRUB SERPL-MCNC: 0.69 MG/DL (ref 0.2–1)
BUN SERPL-MCNC: 16 MG/DL (ref 5–25)
CALCIUM SERPL-MCNC: 8.9 MG/DL (ref 8.4–10.2)
CHLORIDE SERPL-SCNC: 104 MMOL/L (ref 96–108)
CHOLEST SERPL-MCNC: 87 MG/DL
CO2 SERPL-SCNC: 32 MMOL/L (ref 21–32)
CREAT SERPL-MCNC: 0.99 MG/DL (ref 0.6–1.3)
EOSINOPHIL # BLD AUTO: 0.05 THOUSAND/ÂΜL (ref 0–0.61)
EOSINOPHIL NFR BLD AUTO: 1 % (ref 0–6)
ERYTHROCYTE [DISTWIDTH] IN BLOOD BY AUTOMATED COUNT: 12.3 % (ref 11.6–15.1)
GFR SERPL CREATININE-BSD FRML MDRD: 76 ML/MIN/1.73SQ M
GLUCOSE P FAST SERPL-MCNC: 108 MG/DL (ref 65–99)
HCT VFR BLD AUTO: 45.7 % (ref 36.5–49.3)
HDLC SERPL-MCNC: 38 MG/DL
HGB BLD-MCNC: 14.9 G/DL (ref 12–17)
IMM GRANULOCYTES # BLD AUTO: 0.01 THOUSAND/UL (ref 0–0.2)
IMM GRANULOCYTES NFR BLD AUTO: 0 % (ref 0–2)
LDLC SERPL CALC-MCNC: 36 MG/DL (ref 0–100)
LYMPHOCYTES # BLD AUTO: 2.01 THOUSANDS/ÂΜL (ref 0.6–4.47)
LYMPHOCYTES NFR BLD AUTO: 37 % (ref 14–44)
MCH RBC QN AUTO: 30.5 PG (ref 26.8–34.3)
MCHC RBC AUTO-ENTMCNC: 32.6 G/DL (ref 31.4–37.4)
MCV RBC AUTO: 94 FL (ref 82–98)
MONOCYTES # BLD AUTO: 0.48 THOUSAND/ÂΜL (ref 0.17–1.22)
MONOCYTES NFR BLD AUTO: 9 % (ref 4–12)
NEUTROPHILS # BLD AUTO: 2.93 THOUSANDS/ÂΜL (ref 1.85–7.62)
NEUTS SEG NFR BLD AUTO: 52 % (ref 43–75)
NONHDLC SERPL-MCNC: 49 MG/DL
NRBC BLD AUTO-RTO: 0 /100 WBCS
PLATELET # BLD AUTO: 195 THOUSANDS/UL (ref 149–390)
PMV BLD AUTO: 10.5 FL (ref 8.9–12.7)
POTASSIUM SERPL-SCNC: 4.5 MMOL/L (ref 3.5–5.3)
PROT SERPL-MCNC: 6.7 G/DL (ref 6.4–8.4)
PSA SERPL-MCNC: 0.96 NG/ML (ref 0–4)
RBC # BLD AUTO: 4.89 MILLION/UL (ref 3.88–5.62)
SODIUM SERPL-SCNC: 142 MMOL/L (ref 135–147)
TRIGL SERPL-MCNC: 67 MG/DL
WBC # BLD AUTO: 5.51 THOUSAND/UL (ref 4.31–10.16)

## 2024-03-27 PROCEDURE — 80061 LIPID PANEL: CPT

## 2024-03-27 PROCEDURE — G0103 PSA SCREENING: HCPCS

## 2024-03-27 PROCEDURE — 80053 COMPREHEN METABOLIC PANEL: CPT

## 2024-03-27 PROCEDURE — 36415 COLL VENOUS BLD VENIPUNCTURE: CPT

## 2024-04-04 ENCOUNTER — OFFICE VISIT (OUTPATIENT)
Dept: CARDIOLOGY CLINIC | Facility: CLINIC | Age: 69
End: 2024-04-04
Payer: COMMERCIAL

## 2024-04-04 VITALS
OXYGEN SATURATION: 97 % | SYSTOLIC BLOOD PRESSURE: 119 MMHG | WEIGHT: 161 LBS | DIASTOLIC BLOOD PRESSURE: 76 MMHG | RESPIRATION RATE: 18 BRPM | BODY MASS INDEX: 24.4 KG/M2 | HEART RATE: 63 BPM | HEIGHT: 68 IN

## 2024-04-04 DIAGNOSIS — I25.10 CORONARY ARTERY DISEASE INVOLVING NATIVE CORONARY ARTERY OF NATIVE HEART WITHOUT ANGINA PECTORIS: Primary | ICD-10-CM

## 2024-04-04 DIAGNOSIS — E78.2 MIXED HYPERLIPIDEMIA: ICD-10-CM

## 2024-04-04 PROCEDURE — 99214 OFFICE O/P EST MOD 30 MIN: CPT | Performed by: INTERNAL MEDICINE

## 2024-04-04 RX ORDER — NITROGLYCERIN 0.4 MG/1
0.4 TABLET SUBLINGUAL
Qty: 100 TABLET | Refills: 0 | Status: SHIPPED | OUTPATIENT
Start: 2024-04-04

## 2024-04-04 RX ORDER — ATORVASTATIN CALCIUM 40 MG/1
40 TABLET, FILM COATED ORAL DAILY
Qty: 90 TABLET | Refills: 3 | Status: SHIPPED | OUTPATIENT
Start: 2024-04-04

## 2024-04-04 NOTE — PROGRESS NOTES
Cardiology Follow Up    Júnior Lee III  1955  [id number]  [facility] DANIAL TangJudy [place] [address] [phone]    Discussion/Summary:  CAD s/p PCI to LAD and residual CAD in OM - on medical management  Hypertension  Dyslipidemia    Doing well. No complaints. Completely asymptomatic. Remains active.   Cont with ASA, statin, metoprolol and imdur    He will inform us with onset of cardiac symptoms    Recommend patient presents to the emergency room or call our office if he has any new/persistent or progressive symptoms.    Previous studies were reviewed.    Safety measures were reviewed.  Questions were entertained and answered.  Patient was advised to report any problems requiring medical attention.    Follow-up with PCP and appropriate specialist and lab work as discussed.    Return for follow up visit as scheduled or earlier, if needed.    Thank you for allowing me to participate in the care and evaluation of your patient.  Should you have any questions, please feel free to contact me.      History of Present Illness:     Júnior Lee III is a 68 y.o. male who presents for follow up for cardiovascular care.    Denies chest pain, chest pressure, shortness of breath, dizziness, lightheadedness, presyncope or syncope.  Denies orthopnea, PND or lower limb edema.    He is very active. Was shoveling snow. No symptoms      He has had multiple discussion with regard to management of residual CAD, wants medical management.  Does not want intervention.  States he is asymptomatic.     Requesting sublingual nitro refill as prior meds have . He has never used SL nitro    Echo 22:         Left Ventricle: Left ventricular cavity size is normal. Wall thickness is normal. The left ventricular ejection fraction is 60%. Systolic function is normal. Wall motion is normal. Diastolic function is mildly  abnormal, consistent with grade I (abnormal) relaxation.    Right Ventricle: Right ventricular cavity size is normal. Systolic function is normal.     Cardiac cath :  Mid LAD 99 % stenosis status post successful PCI with drug-eluting stent x1  Proximal LAD 40 percent stenosis  Proximal circumflex discrete 40 percent stenosis  OM1 is medium to large size long vessel with 80% percent stenosis in mid  Proximal RCA discrete 40 percent stenosis      Patient Active Problem List   Diagnosis    Other chest pain    Primary hypertension    LOPEZ (dyspnea on exertion)    Mixed hyperlipidemia    Coronary artery disease involving native coronary artery of native heart without angina pectoris     Past Medical History:   Diagnosis Date    GERD (gastroesophageal reflux disease)     Hyperlipidemia     Hypertension      Social History     Socioeconomic History    Marital status: Single     Spouse name: Not on file    Number of children: Not on file    Years of education: Not on file    Highest education level: Not on file   Occupational History    Not on file   Tobacco Use    Smoking status: Former     Current packs/day: 0.00     Types: Cigarettes     Quit date:      Years since quittin.2    Smokeless tobacco: Never   Vaping Use    Vaping status: Every Day    Substances: Nicotine   Substance and Sexual Activity    Alcohol use: Not Currently     Comment: recovering alcoholic    Drug use: Yes     Types: Marijuana     Comment: medical marijuana card    Sexual activity: Not Currently   Other Topics Concern    Not on file   Social History Narrative    Not on file     Social Determinants of Health     Financial Resource Strain: Medium Risk (2023)    Overall Financial Resource Strain (CARDIA)     Difficulty of Paying Living Expenses: Somewhat hard   Food Insecurity: Not on file   Transportation Needs: No Transportation Needs (2023)    PRAPARE - Transportation     Lack of Transportation (Medical): No     Lack of  "Transportation (Non-Medical): No   Physical Activity: Not on file   Stress: Not on file   Social Connections: Not on file   Intimate Partner Violence: Not on file   Housing Stability: Not on file      Family History   Problem Relation Age of Onset    Cancer Mother     Heart attack Father         passed at 61    Cancer Sister      Past Surgical History:   Procedure Laterality Date    CARDIAC CATHETERIZATION N/A 04/27/2022    Procedure: Cardiac catheterization;  Surgeon: Farzad Walker MD;  Location: [facility];  Service: Cardiology    CARDIAC CATHETERIZATION Left 04/27/2022    Procedure: Cardiac Left Heart Cath;  Surgeon: Farzad Walker MD;  Location: [facility];  Service: Cardiology    COLONOSCOPY N/A     polyps removed    HERNIA REPAIR         Current Outpatient Medications:     aspirin (Ecotrin Low Strength) 81 mg EC tablet, Take 1 tablet (81 mg total) by mouth daily, Disp: 90 tablet, Rfl: 3    atorvastatin (LIPITOR) 40 mg tablet, TAKE 1 TABLET DAILY, Disp: 90 tablet, Rfl: 3    isosorbide mononitrate (IMDUR) 30 mg 24 hr tablet, TAKE 1 TABLET DAILY, Disp: 90 tablet, Rfl: 3    lansoprazole (PREVACID) 30 mg capsule, TAKE 1 CAPSULE DAILY, Disp: 90 capsule, Rfl: 3    metoprolol succinate (TOPROL-XL) 25 mg 24 hr tablet, Take 1 tablet (25 mg total) by mouth daily, Disp: 90 tablet, Rfl: 3    nitroglycerin (NITROSTAT) 0.4 mg SL tablet, Place 1 tablet (0.4 mg total) under the tongue every 5 (five) minutes as needed for chest pain, Disp: 100 tablet, Rfl: 0  No Known Allergies    Labs:  Lab Results   Component Value Date    WBC 5.51 03/27/2024    HGB 14.9 03/27/2024    HCT 45.7 03/27/2024    MCV 94 03/27/2024     03/27/2024     Lab Results   Component Value Date    CALCIUM 8.9 03/27/2024    K 4.5 03/27/2024    CO2 32 03/27/2024     03/27/2024    BUN 16 03/27/2024    CREATININE 0.99 03/27/2024     No results found for: \"HGBA1C\"  No results found for: \"CHOL\"  Lab Results   Component Value " "Date    HDL 38 (L) 03/27/2024    HDL 46 02/10/2023    HDL 37 (L) 09/15/2022     Lab Results   Component Value Date    LDLCALC 36 03/27/2024    LDLCALC 43 02/10/2023    LDLCALC 45 09/15/2022     Lab Results   Component Value Date    TRIG 67 03/27/2024    TRIG 59 02/10/2023    TRIG 75 09/15/2022     No results found for: \"CHOLHDL\"    Review of Systems:  Review of Systems   Constitutional: Negative.  Negative for activity change, appetite change and fatigue.   Respiratory:  Negative for chest tightness and shortness of breath.    Cardiovascular:  Negative for chest pain, palpitations and leg swelling.   Gastrointestinal:  Negative for nausea and vomiting.   Musculoskeletal:  Negative for arthralgias and back pain.   Skin:  Negative for color change and pallor.   Neurological:  Negative for dizziness, syncope, weakness and light-headedness.   Hematological:  Negative for adenopathy.   Psychiatric/Behavioral:  Negative for agitation.    All other systems reviewed and are negative.      Physical Exam:  Physical Exam  Vitals and nursing note reviewed.   Constitutional:       General: He is not in acute distress.     Appearance: Normal appearance. He is not ill-appearing or diaphoretic.   HENT:      Head: Normocephalic and atraumatic.   Eyes:      Extraocular Movements: Extraocular movements intact.   Cardiovascular:      Rate and Rhythm: Normal rate and regular rhythm.      Heart sounds: No murmur heard.     No friction rub. No gallop.   Pulmonary:      Effort: No respiratory distress.      Breath sounds: Normal breath sounds.   Abdominal:      General: There is no distension.      Palpations: Abdomen is soft.   Musculoskeletal:         General: No swelling. Normal range of motion.      Cervical back: Normal range of motion.   Skin:     General: Skin is warm and dry.      Capillary Refill: Capillary refill takes less than 2 seconds.      Coloration: Skin is not pale.   Neurological:      General: No focal deficit present. "      Mental Status: He is alert and oriented to person, place, and time.      Cranial Nerves: No cranial nerve deficit.   Psychiatric:         Mood and Affect: Mood normal.         Behavior: Behavior normal.

## 2024-04-11 DIAGNOSIS — I10 PRIMARY HYPERTENSION: ICD-10-CM

## 2024-04-11 DIAGNOSIS — R07.89 OTHER CHEST PAIN: ICD-10-CM

## 2024-04-11 DIAGNOSIS — R06.09 DOE (DYSPNEA ON EXERTION): ICD-10-CM

## 2024-04-11 RX ORDER — METOPROLOL SUCCINATE 25 MG/1
25 TABLET, EXTENDED RELEASE ORAL DAILY
Qty: 90 TABLET | Refills: 1 | Status: SHIPPED | OUTPATIENT
Start: 2024-04-11

## 2024-04-11 RX ORDER — ASPIRIN 81 MG/1
81 TABLET, COATED ORAL DAILY
Qty: 90 TABLET | Refills: 1 | Status: SHIPPED | OUTPATIENT
Start: 2024-04-11

## 2024-04-18 ENCOUNTER — RA CDI HCC (OUTPATIENT)
Dept: OTHER | Facility: HOSPITAL | Age: 69
End: 2024-04-18

## 2024-05-14 ENCOUNTER — OFFICE VISIT (OUTPATIENT)
Dept: FAMILY MEDICINE CLINIC | Facility: CLINIC | Age: 69
End: 2024-05-14
Payer: COMMERCIAL

## 2024-05-14 VITALS
HEART RATE: 70 BPM | TEMPERATURE: 97.8 F | HEIGHT: 68 IN | BODY MASS INDEX: 23.98 KG/M2 | DIASTOLIC BLOOD PRESSURE: 78 MMHG | OXYGEN SATURATION: 98 % | WEIGHT: 158.2 LBS | SYSTOLIC BLOOD PRESSURE: 118 MMHG

## 2024-05-14 DIAGNOSIS — I10 PRIMARY HYPERTENSION: Primary | ICD-10-CM

## 2024-05-14 DIAGNOSIS — Z86.19 HISTORY OF HEPATITIS C: ICD-10-CM

## 2024-05-14 DIAGNOSIS — Z87.891 PERSONAL HISTORY OF NICOTINE DEPENDENCE: ICD-10-CM

## 2024-05-14 DIAGNOSIS — Z00.00 MEDICARE ANNUAL WELLNESS VISIT, SUBSEQUENT: ICD-10-CM

## 2024-05-14 DIAGNOSIS — I25.10 CORONARY ARTERY DISEASE INVOLVING NATIVE CORONARY ARTERY OF NATIVE HEART WITHOUT ANGINA PECTORIS: ICD-10-CM

## 2024-05-14 DIAGNOSIS — E78.2 MIXED HYPERLIPIDEMIA: ICD-10-CM

## 2024-05-14 DIAGNOSIS — F17.290 OTHER TOBACCO PRODUCT NICOTINE DEPENDENCE, UNCOMPLICATED: ICD-10-CM

## 2024-05-14 PROCEDURE — G0439 PPPS, SUBSEQ VISIT: HCPCS | Performed by: PHYSICIAN ASSISTANT

## 2024-05-14 PROCEDURE — 99214 OFFICE O/P EST MOD 30 MIN: CPT | Performed by: PHYSICIAN ASSISTANT

## 2024-05-14 NOTE — PATIENT INSTRUCTIONS
Medicare Preventive Visit Patient Instructions  Thank you for completing your Welcome to Medicare Visit or Medicare Annual Wellness Visit today. Your next wellness visit will be due in one year (5/15/2025).  The screening/preventive services that you may require over the next 5-10 years are detailed below. Some tests may not apply to you based off risk factors and/or age. Screening tests ordered at today's visit but not completed yet may show as past due. Also, please note that scanned in results may not display below.  Preventive Screenings:  Service Recommendations Previous Testing/Comments   Colorectal Cancer Screening  Colonoscopy    Fecal Occult Blood Test (FOBT)/Fecal Immunochemical Test (FIT)  Fecal DNA/Cologuard Test  Flexible Sigmoidoscopy Age: 45-75 years old   Colonoscopy: every 10 years (May be performed more frequently if at higher risk)  OR  FOBT/FIT: every 1 year  OR  Cologuard: every 3 years  OR  Sigmoidoscopy: every 5 years  Screening may be recommended earlier than age 45 if at higher risk for colorectal cancer. Also, an individualized decision between you and your healthcare provider will decide whether screening between the ages of 76-85 would be appropriate. Colonoscopy: 10/10/2023  FOBT/FIT: Not on file  Cologuard: 01/30/2023  Sigmoidoscopy: Not on file    Screening Current     Prostate Cancer Screening Individualized decision between patient and health care provider in men between ages of 55-69   Medicare will cover every 12 months beginning on the day after your 50th birthday PSA: 0.96 ng/mL     Screening Current     Hepatitis C Screening Once for adults born between 1945 and 1965  More frequently in patients at high risk for Hepatitis C Hep C Antibody: Not on file        Diabetes Screening 1-2 times per year if you're at risk for diabetes or have pre-diabetes Fasting glucose: 108 mg/dL (3/27/2024)  A1C: No results in last 5 years (No results in last 5 years)  Screening Current   Cholesterol  Screening Once every 5 years if you don't have a lipid disorder. May order more often based on risk factors. Lipid panel: 03/27/2024  Screening Not Indicated  History Lipid Disorder      Other Preventive Screenings Covered by Medicare:  Abdominal Aortic Aneurysm (AAA) Screening: covered once if your at risk. You're considered to be at risk if you have a family history of AAA or a male between the age of 65-75 who smoking at least 100 cigarettes in your lifetime.  Lung Cancer Screening: covers low dose CT scan once per year if you meet all of the following conditions: (1) Age 55-77; (2) No signs or symptoms of lung cancer; (3) Current smoker or have quit smoking within the last 15 years; (4) You have a tobacco smoking history of at least 20 pack years (packs per day x number of years you smoked); (5) You get a written order from a healthcare provider.  Glaucoma Screening: covered annually if you're considered high risk: (1) You have diabetes OR (2) Family history of glaucoma OR (3)  aged 50 and older OR (4)  American aged 65 and older  Osteoporosis Screening: covered every 2 years if you meet one of the following conditions: (1) Have a vertebral abnormality; (2) On glucocorticoid therapy for more than 3 months; (3) Have primary hyperparathyroidism; (4) On osteoporosis medications and need to assess response to drug therapy.  HIV Screening: covered annually if you're between the age of 15-65. Also covered annually if you are younger than 15 and older than 65 with risk factors for HIV infection. For pregnant patients, it is covered up to 3 times per pregnancy.    Immunizations:  Immunization Recommendations   Influenza Vaccine Annual influenza vaccination during flu season is recommended for all persons aged >= 6 months who do not have contraindications   Pneumococcal Vaccine   * Pneumococcal conjugate vaccine = PCV13 (Prevnar 13), PCV15 (Vaxneuvance), PCV20 (Prevnar 20)  * Pneumococcal  polysaccharide vaccine = PPSV23 (Pneumovax) Adults 19-63 yo with certain risk factors or if 65+ yo  If never received any pneumonia vaccine: recommend Prevnar 20 (PCV20)  Give PCV20 if previously received 1 dose of PCV13 or PPSV23   Hepatitis B Vaccine 3 dose series if at intermediate or high risk (ex: diabetes, end stage renal disease, liver disease)   Respiratory syncytial virus (RSV) Vaccine - COVERED BY MEDICARE PART D  * RSVPreF3 (Arexvy) CDC recommends that adults 60 years of age and older may receive a single dose of RSV vaccine using shared clinical decision-making (SCDM)   Tetanus (Td) Vaccine - COST NOT COVERED BY MEDICARE PART B Following completion of primary series, a booster dose should be given every 10 years to maintain immunity against tetanus. Td may also be given as tetanus wound prophylaxis.   Tdap Vaccine - COST NOT COVERED BY MEDICARE PART B Recommended at least once for all adults. For pregnant patients, recommended with each pregnancy.   Shingles Vaccine (Shingrix) - COST NOT COVERED BY MEDICARE PART B  2 shot series recommended in those 19 years and older who have or will have weakened immune systems or those 50 years and older     Health Maintenance Due:      Topic Date Due   • Hepatitis C Screening  Never done   • Colorectal Cancer Screening  10/08/2030     Immunizations Due:      Topic Date Due   • Pneumococcal Vaccine: 65+ Years (1 of 1 - PCV) Never done   • COVID-19 Vaccine (2 - 2023-24 season) 09/01/2023     Advance Directives   What are advance directives?  Advance directives are legal documents that state your wishes and plans for medical care. These plans are made ahead of time in case you lose your ability to make decisions for yourself. Advance directives can apply to any medical decision, such as the treatments you want, and if you want to donate organs.   What are the types of advance directives?  There are many types of advance directives, and each state has rules about how to  use them. You may choose a combination of any of the following:  Living will:  This is a written record of the treatment you want. You can also choose which treatments you do not want, which to limit, and which to stop at a certain time. This includes surgery, medicine, IV fluid, and tube feedings.   Durable power of  for healthcare (DPAHC):  This is a written record that states who you want to make healthcare choices for you when you are unable to make them for yourself. This person, called a proxy, is usually a family member or a friend. You may choose more than 1 proxy.  Do not resuscitate (DNR) order:  A DNR order is used in case your heart stops beating or you stop breathing. It is a request not to have certain forms of treatment, such as CPR. A DNR order may be included in other types of advance directives.  Medical directive:  This covers the care that you want if you are in a coma, near death, or unable to make decisions for yourself. You can list the treatments you want for each condition. Treatment may include pain medicine, surgery, blood transfusions, dialysis, IV or tube feedings, and a ventilator (breathing machine).  Values history:  This document has questions about your views, beliefs, and how you feel and think about life. This information can help others choose the care that you would choose.  Why are advance directives important?  An advance directive helps you control your care. Although spoken wishes may be used, it is better to have your wishes written down. Spoken wishes can be misunderstood, or not followed. Treatments may be given even if you do not want them. An advance directive may make it easier for your family to make difficult choices about your care.       © Copyright [employer] 2018 Information is for End User's use only and may not be sold, redistributed or otherwise used for commercial purposes. All illustrations and images included in CareNotes® are the copyrighted  property of [employer] or [employer]

## 2024-05-14 NOTE — PROGRESS NOTES
Assessment and Plan:     Problem List Items Addressed This Visit        Cardiovascular and Mediastinum    Primary hypertension - Primary    Relevant Orders    Comprehensive metabolic panel    Coronary artery disease involving native coronary artery of native heart without angina pectoris       Other    Mixed hyperlipidemia    Relevant Orders    Lipid Panel with Direct LDL reflex   Other Visit Diagnoses     Medicare annual wellness visit, subsequent        Other tobacco product nicotine dependence, uncomplicated        Relevant Orders    CT lung screening program    History of hepatitis C        Relevant Orders    Hepatitis C RNA, Quantitative PCR, [facility]    Personal history of nicotine dependence        Relevant Orders    CT lung screening program      Hx reviewed and updated. Labs reviewed per HPI. BP, lipids well controlled. No cardiac complaints. Continue all current medications. Update labs in 6 months. Update HM as above. Annual follow ups, earlier prn     Preventive health issues were discussed with patient, and age appropriate screening tests were ordered as noted in patient's After Visit Summary.  Personalized health advice and appropriate referrals for health education or preventive services given if needed, as noted in patient's After Visit Summary.     History of Present Illness:     Patient presents for a Medicare Wellness Visit    Pt presents for follow up    CAD s/p PCI to LAD. No cardiac complaints. Monitored by cardiology. On asa, staitn, imdur, toprol prn nitro  HTN: well controlled 118/78  HLD. On statin  Hx of hep c  Continues to vape, previous cigarette smoker    Most recent labs reviewed as below    Lab Results       Component                Value               Date                       PSA                      0.96                03/27/2024                 PSA                      0.8                 06/23/2022            Lab Results       Component                Value               Date                        WBC                      5.51                03/27/2024                 HGB                      14.9                03/27/2024                 HCT                      45.7                03/27/2024                 MCV                      94                  03/27/2024                 PLT                      195                 03/27/2024            Lab Results       Component                Value               Date                       SODIUM                   142                 03/27/2024                 K                        4.5                 03/27/2024                 CL                       104                 03/27/2024                 CO2                      32                  03/27/2024                 AGAP                     6                   03/27/2024                 BUN                      16                  03/27/2024                 CREATININE               0.99                03/27/2024                 GLUC                     108                 04/28/2022                 GLUF                     108 (H)             03/27/2024                 CALCIUM                  8.9                 03/27/2024                 AST                      20                  03/27/2024                 ALT                      16                  03/27/2024                 ALKPHOS                  69                  03/27/2024                 TP                       6.7                 03/27/2024                 TBILI                    0.69                03/27/2024                 EGFR                     76                  03/27/2024              Lab Results       Component                Value               Date                       CHOLESTEROL              87                  03/27/2024                 CHOLESTEROL              101                 02/10/2023                 CHOLESTEROL              97                  09/15/2022            Lab Results       Component                 Value               Date                       HDL                      38 (L)              03/27/2024                 HDL                      46                  02/10/2023                 HDL                      37 (L)              09/15/2022            Lab Results       Component                Value               Date                       TRIG                     67                  03/27/2024                 TRIG                     59                  02/10/2023                 TRIG                     75                  09/15/2022            Lab Results       Component                Value               Date                       NONHDLC                  49                  03/27/2024                 NONHDLC                  55                  02/10/2023              Lab Results       Component                Value               Date                       LDLCALC                  36                  03/27/2024                   Patient Care Team:  Sonido Giang PA-C as PCP - General (Family Medicine)     Review of Systems:     Review of Systems   Constitutional:  Negative for chills, fatigue and fever.   HENT:  Negative for congestion, ear pain, hearing loss, nosebleeds, postnasal drip, rhinorrhea, sinus pressure, sinus pain, sneezing and sore throat.    Eyes:  Negative for pain, discharge, itching and visual disturbance.   Respiratory:  Negative for cough, chest tightness, shortness of breath and wheezing.    Cardiovascular:  Negative for chest pain, palpitations and leg swelling.   Gastrointestinal:  Negative for abdominal pain, blood in stool, constipation, diarrhea, nausea and vomiting.   Genitourinary:  Negative for frequency and urgency.   Neurological:  Negative for dizziness, light-headedness and numbness.        Problem List:     Patient Active Problem List   Diagnosis   • Other chest pain   • Primary hypertension   • LOPEZ (dyspnea on exertion)   • Mixed hyperlipidemia   • Coronary artery  disease involving native coronary artery of native heart without angina pectoris      Past Medical and Surgical History:     Past Medical History:   Diagnosis Date   • GERD (gastroesophageal reflux disease)    • Hyperlipidemia    • Hypertension      Past Surgical History:   Procedure Laterality Date   • CARDIAC CATHETERIZATION N/A 2022    Procedure: Cardiac catheterization;  Surgeon: Farzad Walker MD;  Location: [facility];  Service: Cardiology   • CARDIAC CATHETERIZATION Left 2022    Procedure: Cardiac Left Heart Cath;  Surgeon: Farzad Walker MD;  Location: [facility];  Service: Cardiology   • COLONOSCOPY N/A     polyps removed   • HERNIA REPAIR        Family History:     Family History   Problem Relation Age of Onset   • Cancer Mother    • Heart attack Father         passed at 61   • Cancer Sister       Social History:     Social History     Socioeconomic History   • Marital status: Single     Spouse name: None   • Number of children: None   • Years of education: None   • Highest education level: None   Occupational History   • None   Tobacco Use   • Smoking status: Former     Current packs/day: 0.00     Types: Cigarettes     Quit date:      Years since quittin.3   • Smokeless tobacco: Never   Vaping Use   • Vaping status: Every Day   • Substances: Nicotine   Substance and Sexual Activity   • Alcohol use: Not Currently     Comment: recovering alcoholic   • Drug use: Yes     Types: Marijuana     Comment: medical marijuana card   • Sexual activity: Not Currently   Other Topics Concern   • None   Social History Narrative   • None     Social Determinants of Health     Financial Resource Strain: Medium Risk (2023)    Overall Financial Resource Strain (CARDIA)    • Difficulty of Paying Living Expenses: Somewhat hard   Food Insecurity: No Food Insecurity (2024)    Hunger Vital Sign    • Worried About Running Out of Food in the Last Year: Never true    • Ran Out of  Food in the Last Year: Never true   Transportation Needs: No Transportation Needs (5/14/2024)    PRAPARE - Transportation    • Lack of Transportation (Medical): No    • Lack of Transportation (Non-Medical): No   Physical Activity: Not on file   Stress: Not on file   Social Connections: Not on file   Intimate Partner Violence: Not on file   Housing Stability: Low Risk  (5/14/2024)    Housing Stability Vital Sign    • Unable to Pay for Housing in the Last Year: No    • Number of Places Lived in the Last Year: 1    • Unstable Housing in the Last Year: No      Medications and Allergies:     Current Outpatient Medications   Medication Sig Dispense Refill   • aspirin (Aspirin Low Dose) 81 mg EC tablet TAKE 1 TABLET DAILY 90 tablet 1   • atorvastatin (LIPITOR) 40 mg tablet TAKE 1 TABLET DAILY 90 tablet 3   • isosorbide mononitrate (IMDUR) 30 mg 24 hr tablet TAKE 1 TABLET DAILY 90 tablet 3   • lansoprazole (PREVACID) 30 mg capsule TAKE 1 CAPSULE DAILY 90 capsule 3   • metoprolol succinate (TOPROL-XL) 25 mg 24 hr tablet TAKE 1 TABLET DAILY 90 tablet 1   • nitroglycerin (NITROSTAT) 0.4 mg SL tablet Place 1 tablet (0.4 mg total) under the tongue every 5 (five) minutes as needed for chest pain 100 tablet 0     No current facility-administered medications for this visit.     No Known Allergies   Immunizations:     Immunization History   Administered Date(s) Administered   • COVID-19 J&J (Hermelindo) vaccine 0.5 mL 05/04/2021      Health Maintenance:         Topic Date Due   • Hepatitis C Screening  Never done   • Colorectal Cancer Screening  10/08/2030         Topic Date Due   • Pneumococcal Vaccine: 65+ Years (1 of 1 - PCV) Never done   • COVID-19 Vaccine (2 - 2023-24 season) 09/01/2023      Medicare Screening Tests and Risk Assessments:     Júnior is here for his Subsequent Wellness visit. Last Medicare Wellness visit information reviewed, patient interviewed and updates made to the record today.      Health Risk Assessment:    Patient rates overall health as good. Patient feels that their physical health rating is same. Patient is satisfied with their life. Eyesight was rated as same. Hearing was rated as same. Patient feels that their emotional and mental health rating is same. Patients states they are never, rarely angry. Patient states they are sometimes unusually tired/fatigued. Pain experienced in the last 7 days has been some. Patient's pain rating has been 2/10. Patient states that he has experienced no weight loss or gain in last 6 months.     Depression Screening:   PHQ-2 Score: 0      Fall Risk Screening:   In the past year, patient has experienced: no history of falling in past year      Home Safety:  Patient does not have trouble with stairs inside or outside of their home. Patient has working smoke alarms and has working carbon monoxide detector. Home safety hazards include: none.     Nutrition:   Current diet is Regular, Low Saturated Fat, No Added Salt and Limited junk food.     Medications:   Patient is not currently taking any over-the-counter supplements. Patient is able to manage medications.     Activities of Daily Living (ADLs)/Instrumental Activities of Daily Living (IADLs):   Walk and transfer into and out of bed and chair?: Yes  Dress and groom yourself?: Yes    Bathe or shower yourself?: Yes    Feed yourself? Yes  Do your laundry/housekeeping?: Yes  Manage your money, pay your bills and track your expenses?: Yes  Make your own meals?: Yes    Do your own shopping?: Yes    Previous Hospitalizations:   Any hospitalizations or ED visits within the last 12 months?: No      Advance Care Planning:   Living will: No    Durable POA for healthcare: No    Advanced directive: No    Advanced directive counseling given: Yes    ACP document given: Yes      PREVENTIVE SCREENINGS      Cardiovascular Screening:    General: Screening Not Indicated and History Lipid Disorder      Diabetes Screening:     General: Screening  "Current      Colorectal Cancer Screening:     General: Screening Current      Prostate Cancer Screening:    General: Screening Current      Osteoporosis Screening:    General: Screening Not Indicated      Abdominal Aortic Aneurysm (AAA) Screening:    Risk factors include: age between 65-74 yo and tobacco use        General: Screening Not Indicated      Lung Cancer Screening:     General: Risks and Benefits Discussed    Due for: Low Dose CT (LDCT)      Hepatitis C Screening:    General: Screening Not Indicated and History Hepatitis C    Screening, Brief Intervention, and Referral to Treatment (SBIRT)    Screening  Typical number of drinks in a day: 0  Typical number of drinks in a week: 0  Interpretation: Low risk drinking behavior.    AUDIT-C Screenin) How often did you have a drink containing alcohol in the past year? never  2) How many drinks did you have on a typical day when you were drinking in the past year? 0  3) How often did you have 6 or more drinks on one occasion in the past year? never    AUDIT-C Score: 0  Interpretation: Score 0-3 (male): Negative screen for alcohol misuse    Single Item Drug Screening:  How often have you used an illegal drug (including marijuana) or a prescription medication for non-medical reasons in the past year? never    Single Item Drug Screen Score: 0  Interpretation: Negative screen for possible drug use disorder    No results found.     Physical Exam:     /78   Pulse 70   Temp 97.8 °F (36.6 °C)   Ht 5' 8\" (1.727 m)   Wt 71.8 kg (158 lb 3.2 oz)   SpO2 98%   BMI 24.05 kg/m²     Physical Exam  Vitals and nursing note reviewed.   Constitutional:       General: He is not in acute distress.     Appearance: He is well-developed.   HENT:      Head: Normocephalic and atraumatic.   Eyes:      Conjunctiva/sclera: Conjunctivae normal.   Neck:      Vascular: No carotid bruit.   Cardiovascular:      Rate and Rhythm: Normal rate and regular rhythm.      Heart sounds: No " murmur heard.  Pulmonary:      Effort: Pulmonary effort is normal. No respiratory distress.      Breath sounds: Normal breath sounds. No wheezing, rhonchi or rales.   Abdominal:      Palpations: Abdomen is soft.      Tenderness: There is no abdominal tenderness.   Musculoskeletal:         General: No swelling.      Cervical back: Neck supple.      Right lower leg: No edema.      Left lower leg: No edema.   Skin:     General: Skin is warm and dry.      Capillary Refill: Capillary refill takes less than 2 seconds.   Neurological:      Mental Status: He is alert.   Psychiatric:         Mood and Affect: Mood normal.          Sonido Giang PA-C

## 2024-05-27 DIAGNOSIS — K21.9 GASTROESOPHAGEAL REFLUX DISEASE WITHOUT ESOPHAGITIS: ICD-10-CM

## 2024-05-27 RX ORDER — LANSOPRAZOLE 30 MG/1
CAPSULE, DELAYED RELEASE ORAL
Qty: 90 CAPSULE | Refills: 1 | Status: SHIPPED | OUTPATIENT
Start: 2024-05-27

## 2024-10-11 DIAGNOSIS — R07.89 OTHER CHEST PAIN: ICD-10-CM

## 2024-10-11 DIAGNOSIS — I10 PRIMARY HYPERTENSION: ICD-10-CM

## 2024-10-11 DIAGNOSIS — R06.09 DOE (DYSPNEA ON EXERTION): ICD-10-CM

## 2024-10-11 DIAGNOSIS — R07.2 PRECORDIAL CHEST PAIN: ICD-10-CM

## 2024-10-11 DIAGNOSIS — E78.2 MIXED HYPERLIPIDEMIA: ICD-10-CM

## 2024-10-11 NOTE — TELEPHONE ENCOUNTER
Reason for call:   [x] Refill   [] Prior Auth  [] Other:     Office:   [] PCP/Provider -   [x] Specialty/Provider - Card, Dr Chadwick    Medication:   Aspirin 81 mg, 1 daily, 90  Atorvastatin 40mg, 1 tab qd, 90  Isosorbide mononitrate 30 mg, 1 qd, 90  Metoprolol succinate 25 mg. 1qd 90      Pharmacy:   [facility]    Does the patient have enough for 3 days?   [x] Yes   [] No - Send as HP to POD

## 2024-10-14 RX ORDER — ATORVASTATIN CALCIUM 40 MG/1
40 TABLET, FILM COATED ORAL DAILY
Qty: 90 TABLET | Refills: 1 | Status: SHIPPED | OUTPATIENT
Start: 2024-10-14

## 2024-10-14 RX ORDER — METOPROLOL SUCCINATE 25 MG/1
25 TABLET, EXTENDED RELEASE ORAL DAILY
Qty: 90 TABLET | Refills: 1 | Status: SHIPPED | OUTPATIENT
Start: 2024-10-14

## 2024-10-14 RX ORDER — ASPIRIN 81 MG/1
81 TABLET ORAL DAILY
Qty: 90 TABLET | Refills: 1 | Status: SHIPPED | OUTPATIENT
Start: 2024-10-14

## 2024-10-14 RX ORDER — ISOSORBIDE MONONITRATE 30 MG/1
30 TABLET, EXTENDED RELEASE ORAL DAILY
Qty: 90 TABLET | Refills: 1 | Status: SHIPPED | OUTPATIENT
Start: 2024-10-14

## 2024-11-22 DIAGNOSIS — K21.9 GASTROESOPHAGEAL REFLUX DISEASE WITHOUT ESOPHAGITIS: ICD-10-CM

## 2024-11-22 RX ORDER — LANSOPRAZOLE 30 MG/1
30 CAPSULE, DELAYED RELEASE ORAL DAILY
Qty: 90 CAPSULE | Refills: 1 | Status: SHIPPED | OUTPATIENT
Start: 2024-11-22

## 2024-11-26 ENCOUNTER — APPOINTMENT (OUTPATIENT)
Dept: LAB | Facility: CLINIC | Age: 69
End: 2024-11-26
Payer: COMMERCIAL

## 2024-11-26 DIAGNOSIS — I10 PRIMARY HYPERTENSION: ICD-10-CM

## 2024-11-26 DIAGNOSIS — E78.2 MIXED HYPERLIPIDEMIA: ICD-10-CM

## 2024-11-26 DIAGNOSIS — Z86.19 HISTORY OF HEPATITIS C: ICD-10-CM

## 2024-11-26 LAB
ALBUMIN SERPL BCG-MCNC: 4.2 G/DL (ref 3.5–5)
ALP SERPL-CCNC: 79 U/L (ref 34–104)
ALT SERPL W P-5'-P-CCNC: 19 U/L (ref 7–52)
ANION GAP SERPL CALCULATED.3IONS-SCNC: 9 MMOL/L (ref 4–13)
AST SERPL W P-5'-P-CCNC: 21 U/L (ref 13–39)
BILIRUB SERPL-MCNC: 0.62 MG/DL (ref 0.2–1)
BUN SERPL-MCNC: 16 MG/DL (ref 5–25)
CALCIUM SERPL-MCNC: 9 MG/DL (ref 8.4–10.2)
CHLORIDE SERPL-SCNC: 100 MMOL/L (ref 96–108)
CHOLEST SERPL-MCNC: 95 MG/DL (ref ?–200)
CO2 SERPL-SCNC: 29 MMOL/L (ref 21–32)
CREAT SERPL-MCNC: 0.98 MG/DL (ref 0.6–1.3)
GFR SERPL CREATININE-BSD FRML MDRD: 77 ML/MIN/1.73SQ M
GLUCOSE P FAST SERPL-MCNC: 103 MG/DL (ref 65–99)
HDLC SERPL-MCNC: 41 MG/DL
LDLC SERPL CALC-MCNC: 41 MG/DL (ref 0–100)
POTASSIUM SERPL-SCNC: 4.4 MMOL/L (ref 3.5–5.3)
PROT SERPL-MCNC: 6.8 G/DL (ref 6.4–8.4)
SODIUM SERPL-SCNC: 138 MMOL/L (ref 135–147)
TRIGL SERPL-MCNC: 67 MG/DL (ref ?–150)

## 2024-11-26 PROCEDURE — 80053 COMPREHEN METABOLIC PANEL: CPT

## 2024-11-26 PROCEDURE — 87522 HEPATITIS C REVRS TRNSCRPJ: CPT

## 2024-11-26 PROCEDURE — 87521 HEPATITIS C PROBE&RVRS TRNSC: CPT

## 2024-11-26 PROCEDURE — 80061 LIPID PANEL: CPT

## 2024-11-26 PROCEDURE — 36415 COLL VENOUS BLD VENIPUNCTURE: CPT

## 2024-11-27 ENCOUNTER — RESULTS FOLLOW-UP (OUTPATIENT)
Dept: FAMILY MEDICINE CLINIC | Facility: CLINIC | Age: 69
End: 2024-11-27

## 2024-11-27 LAB — HCV RNA SERPL NAA+PROBE-ACNC: NOT DETECTED K[IU]/ML

## 2025-02-11 ENCOUNTER — OFFICE VISIT (OUTPATIENT)
Dept: FAMILY MEDICINE CLINIC | Facility: CLINIC | Age: 70
End: 2025-02-11
Payer: COMMERCIAL

## 2025-02-11 VITALS
BODY MASS INDEX: 24.86 KG/M2 | TEMPERATURE: 98 F | WEIGHT: 164 LBS | DIASTOLIC BLOOD PRESSURE: 80 MMHG | OXYGEN SATURATION: 97 % | HEART RATE: 97 BPM | HEIGHT: 68 IN | SYSTOLIC BLOOD PRESSURE: 130 MMHG

## 2025-02-11 DIAGNOSIS — D36.7 CYST, DERMOID, ARM, LEFT: Primary | ICD-10-CM

## 2025-02-11 PROCEDURE — 99214 OFFICE O/P EST MOD 30 MIN: CPT | Performed by: PHYSICIAN ASSISTANT

## 2025-02-11 PROCEDURE — G2211 COMPLEX E/M VISIT ADD ON: HCPCS | Performed by: PHYSICIAN ASSISTANT

## 2025-02-11 RX ORDER — CEPHALEXIN 500 MG/1
500 CAPSULE ORAL EVERY 6 HOURS SCHEDULED
Qty: 20 CAPSULE | Refills: 0 | Status: SHIPPED | OUTPATIENT
Start: 2025-02-11 | End: 2025-02-16

## 2025-02-11 NOTE — PROGRESS NOTES
Name: Júnior Lee III      : 1955      MRN: [id number]  Encounter Provider: Sonido Giang PA-C  Encounter Date: 2025   Encounter department: [facility]    Assessment & Plan  Cyst, dermoid, arm, left  Begin keflex  Warm compress  Return precautions provided  General surgery for recurrence   Orders:  •  cephalexin (KEFLEX) 500 mg capsule; Take 1 capsule (500 mg total) by mouth every 6 (six) hours for 5 days  •  Ambulatory Referral to General Surgery; Future         History of Present Illness     Pt presents with an infected cyst for a few days on the L arm       Review of Systems   Constitutional: Negative.    Respiratory: Negative.     Cardiovascular: Negative.    Skin:         Cyst, infected, L arm     Past Medical History:   Diagnosis Date   • GERD (gastroesophageal reflux disease)    • Hyperlipidemia    • Hypertension      Past Surgical History:   Procedure Laterality Date   • CARDIAC CATHETERIZATION N/A 2022    Procedure: Cardiac catheterization;  Surgeon: Farzad Walker MD;  Location: [facility];  Service: Cardiology   • CARDIAC CATHETERIZATION Left 2022    Procedure: Cardiac Left Heart Cath;  Surgeon: Farzad Walker MD;  Location: [facility];  Service: Cardiology   • COLONOSCOPY N/A     polyps removed   • HERNIA REPAIR       Family History   Problem Relation Age of Onset   • Cancer Mother    • Heart attack Father         passed at 61   • Cancer Sister      Social History     Tobacco Use   • Smoking status: Former     Current packs/day: 0.00     Types: Cigarettes     Quit date:      Years since quittin.1   • Smokeless tobacco: Never   Vaping Use   • Vaping status: Every Day   • Substances: Nicotine   Substance and Sexual Activity   • Alcohol use: Not Currently     Comment: recovering alcoholic   • Drug use: Yes     Types: Marijuana     Comment: medical marijuana card   • Sexual activity: Not Currently     Current Outpatient  "Medications on File Prior to Visit   Medication Sig   • aspirin (Aspirin Low Dose) 81 mg EC tablet Take 1 tablet (81 mg total) by mouth daily   • atorvastatin (LIPITOR) 40 mg tablet Take 1 tablet (40 mg total) by mouth daily   • isosorbide mononitrate (IMDUR) 30 mg 24 hr tablet Take 1 tablet (30 mg total) by mouth daily   • lansoprazole (PREVACID) 30 mg capsule TAKE 1 CAPSULE DAILY   • metoprolol succinate (TOPROL-XL) 25 mg 24 hr tablet Take 1 tablet (25 mg total) by mouth daily   • nitroglycerin (NITROSTAT) 0.4 mg SL tablet Place 1 tablet (0.4 mg total) under the tongue every 5 (five) minutes as needed for chest pain     No Known Allergies  Immunization History   Administered Date(s) Administered   • COVID-19 J&J (Zocere) vaccine 0.5 mL 05/04/2021     Objective   /80   Pulse 97   Temp 98 °F (36.7 °C)   Ht 5' 8\" (1.727 m)   Wt 74.4 kg (164 lb)   SpO2 97%   BMI 24.94 kg/m²     Physical Exam  Vitals and nursing note reviewed.   Constitutional:       Appearance: Normal appearance.   Pulmonary:      Effort: Pulmonary effort is normal. No respiratory distress.   Skin:     General: Skin is warm and dry.          Neurological:      Mental Status: He is alert and oriented to person, place, and time.         "

## 2025-03-06 ENCOUNTER — APPOINTMENT (OUTPATIENT)
Dept: RADIOLOGY | Facility: CLINIC | Age: 70
End: 2025-03-06
Payer: COMMERCIAL

## 2025-03-06 ENCOUNTER — OFFICE VISIT (OUTPATIENT)
Dept: FAMILY MEDICINE CLINIC | Facility: CLINIC | Age: 70
End: 2025-03-06
Payer: COMMERCIAL

## 2025-03-06 VITALS
DIASTOLIC BLOOD PRESSURE: 84 MMHG | TEMPERATURE: 97.3 F | HEIGHT: 68 IN | SYSTOLIC BLOOD PRESSURE: 132 MMHG | BODY MASS INDEX: 24.4 KG/M2 | OXYGEN SATURATION: 96 % | HEART RATE: 76 BPM | WEIGHT: 161 LBS

## 2025-03-06 DIAGNOSIS — M54.2 CERVICALGIA: Primary | ICD-10-CM

## 2025-03-06 DIAGNOSIS — G44.86 CERVICOGENIC HEADACHE: ICD-10-CM

## 2025-03-06 DIAGNOSIS — M54.2 CERVICALGIA: ICD-10-CM

## 2025-03-06 PROCEDURE — 72050 X-RAY EXAM NECK SPINE 4/5VWS: CPT

## 2025-03-06 PROCEDURE — G2211 COMPLEX E/M VISIT ADD ON: HCPCS | Performed by: PHYSICIAN ASSISTANT

## 2025-03-06 PROCEDURE — 99213 OFFICE O/P EST LOW 20 MIN: CPT | Performed by: PHYSICIAN ASSISTANT

## 2025-03-06 NOTE — PROGRESS NOTES
Name: Júnior Lee III      : 1955      MRN: [id number]  Encounter Provider: Sonido Giang PA-C  Encounter Date: 3/6/2025   Encounter department: [facility]    Assessment & Plan  Cervicalgia  Suspect MSK etiology with cervicogenic headaches  XR cervical spine and begin PT  Prn tylenol  Ice/heat  Normal neurologic exam  No radicular sx  Follow up prn  Orders:  •  XR spine cervical complete 4 or 5 vw non injury; Future  •  Ambulatory Referral to Physical Therapy; Future    Cervicogenic headache    Orders:  •  XR spine cervical complete 4 or 5 vw non injury; Future         History of Present Illness     Pt presents without about 5 months of neck pain, pain at base of skull and occasional sharp shooting pains to the top of the head. No other neurologic sx. No radicular sx. No injuries. Notes some decreased ROM and pain with ROM. Tylenol helps the pain      Review of Systems   Constitutional: Negative.    Respiratory: Negative.     Cardiovascular: Negative.    Musculoskeletal:  Positive for neck pain and neck stiffness.   Neurological: Negative.      Past Medical History:   Diagnosis Date   • GERD (gastroesophageal reflux disease)    • Hyperlipidemia    • Hypertension      Past Surgical History:   Procedure Laterality Date   • CARDIAC CATHETERIZATION N/A 2022    Procedure: Cardiac catheterization;  Surgeon: Farzad Walker MD;  Location: [facility];  Service: Cardiology   • CARDIAC CATHETERIZATION Left 2022    Procedure: Cardiac Left Heart Cath;  Surgeon: Farzad Walker MD;  Location: [facility];  Service: Cardiology   • COLONOSCOPY N/A     polyps removed   • HERNIA REPAIR       Family History   Problem Relation Age of Onset   • Cancer Mother    • Heart attack Father         passed at 61   • Cancer Sister      Social History     Tobacco Use   • Smoking status: Former     Current packs/day: 0.00     Types: Cigarettes     Quit date:      Years since  "quittin.1   • Smokeless tobacco: Never   Vaping Use   • Vaping status: Every Day   • Substances: Nicotine   Substance and Sexual Activity   • Alcohol use: Not Currently     Comment: recovering alcoholic   • Drug use: Yes     Types: Marijuana     Comment: medical marijuana card   • Sexual activity: Not Currently     Current Outpatient Medications on File Prior to Visit   Medication Sig   • aspirin (Aspirin Low Dose) 81 mg EC tablet Take 1 tablet (81 mg total) by mouth daily   • atorvastatin (LIPITOR) 40 mg tablet Take 1 tablet (40 mg total) by mouth daily   • isosorbide mononitrate (IMDUR) 30 mg 24 hr tablet Take 1 tablet (30 mg total) by mouth daily   • lansoprazole (PREVACID) 30 mg capsule TAKE 1 CAPSULE DAILY   • metoprolol succinate (TOPROL-XL) 25 mg 24 hr tablet Take 1 tablet (25 mg total) by mouth daily   • nitroglycerin (NITROSTAT) 0.4 mg SL tablet Place 1 tablet (0.4 mg total) under the tongue every 5 (five) minutes as needed for chest pain     No Known Allergies  Immunization History   Administered Date(s) Administered   • COVID-19 J&J ([employer]) vaccine 0.5 mL 2021     Objective   /84   Pulse 76   Temp (!) 97.3 °F (36.3 °C)   Ht 5' 8\" (1.727 m)   Wt 73 kg (161 lb)   SpO2 96%   BMI 24.48 kg/m²     Physical Exam  Vitals and nursing note reviewed.   Constitutional:       General: He is not in acute distress.     Appearance: He is well-developed.   HENT:      Head: Normocephalic and atraumatic.   Eyes:      Extraocular Movements: Extraocular movements intact.      Pupils: Pupils are equal, round, and reactive to light.   Neck:      Comments: Decreased neck extension and rotation  Cardiovascular:      Rate and Rhythm: Normal rate and regular rhythm.      Heart sounds: No murmur heard.  Pulmonary:      Effort: Pulmonary effort is normal. No respiratory distress.      Breath sounds: Normal breath sounds. No wheezing, rhonchi or rales.   Musculoskeletal:         General: No swelling.      " Cervical back: Neck supple. No signs of trauma. Pain with movement and muscular tenderness present. No spinous process tenderness. Decreased range of motion.   Skin:     General: Skin is warm and dry.      Capillary Refill: Capillary refill takes less than 2 seconds.   Neurological:      General: No focal deficit present.      Mental Status: He is alert.      Cranial Nerves: No cranial nerve deficit.      Sensory: No sensory deficit.      Motor: No weakness.      Coordination: Coordination normal.      Gait: Gait normal.

## 2025-03-10 ENCOUNTER — RESULTS FOLLOW-UP (OUTPATIENT)
Dept: FAMILY MEDICINE CLINIC | Facility: CLINIC | Age: 70
End: 2025-03-10

## 2025-03-11 ENCOUNTER — EVALUATION (OUTPATIENT)
Dept: PHYSICAL THERAPY | Facility: CLINIC | Age: 70
End: 2025-03-11
Payer: COMMERCIAL

## 2025-03-11 DIAGNOSIS — M54.2 CERVICALGIA: Primary | ICD-10-CM

## 2025-03-11 PROCEDURE — 97162 PT EVAL MOD COMPLEX 30 MIN: CPT

## 2025-03-11 PROCEDURE — 97110 THERAPEUTIC EXERCISES: CPT

## 2025-03-11 NOTE — PROGRESS NOTES
PT Evaluation     Today's date: 3/11/2025  Patient name: Júnior Lee III  : 1955  MRN: [id number]  Referring provider: Sonido Giang PA-C  Dx:   Encounter Diagnosis     ICD-10-CM    1. Cervicalgia  M54.2                      Assessment  Impairments: abnormal or restricted ROM, impaired physical strength, lacks appropriate home exercise program, pain with function, poor posture , poor body mechanics and activity limitations  Symptom irritability: moderate    Assessment details: Patient is a 69 year old male presenting to this facility with complaints of neck pain that radiates to his head. This has been going on for about 5-6 months now. Pt has goals to return to fishing and all of his normal activities, even coughing and sneezing w/o increased pain. Upon evaluation, patient presented with limitations in cervical ROM and increased tone and tension in upper traps and levators. Slight fwd head and rounded shoulders noted in seated posture. UE and cervical strength was good. No radicular symptoms present. Discussed proper posture throughout the day at his desk. Patient was issued HEP to begin targeting posture and ROM deficits. Patient would benefit from skilled physical therapy to address deficits noted above in order to decrease pain, improve functional mobility, and to maximize independence with ADLs.   Understanding of Dx/Px/POC: good     Prognosis: good    Goals  ST.  Decreased pain 50% 6 wk   2.  Decrease headaches 50% 6 wk  3.  Increase c-spine ROM 5 degrees  6wk  4.  Pt will report no difficulty with light ADL's 6 wk     LT.  Pt will report no pain 12 wk  2.  Pt will report no headaches 12 wk  3.  Increase c-spine AROM to WNL 12 wk  4.  Increase c-spine strength to WNL 12 wk  5.  Pt will report no limitations with ADL's 12 wk  6. Pt will report ability to fish with no difficulty or neck pain 12 wk    Plan  Patient would benefit from: PT eval and skilled physical therapy  Planned modality  interventions: cryotherapy and thermotherapy: hydrocollator packs    Planned therapy interventions: ADL retraining, body mechanics training, flexibility, functional ROM exercises, graded exercise, home exercise program, manual therapy, neuromuscular re-education, patient/caregiver education, postural training, self care, strengthening, stretching, therapeutic activities and therapeutic exercise    Frequency: 2-3x week  Duration in weeks: 12  Treatment plan discussed with: patient        Subjective Evaluation    History of Present Illness  Mechanism of injury: Patient is a 69 year old male presenting to this facility with complaints of neck pain that has been going on since the end of October. He described this as soreness that gets worse throughout the day and is becoming more unbearable with time. He thought it would fix itself but it did not. No injury or anything that started the pain. He reports waking up one day with the pain. He did get new pillows so he thought it was the reason. He tried changing them back and the pain never went away. Pain is in the top middle of his neck. 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 When he coughs or sneezes, he gets pain shooting to the front of his head. Sometimes if he moves the wrong way he gets a pain.He reports the pain used to refer to the front of the head and then go away, now it seems to be lingering around. Denies numbness or tingling into the hands. He denies weakness into the arms. Denies vision changes. He has tried tylenol and this helps a little. He tried heat and a pain cream but this only helps psychologically maybe. He notices some stiffness in the neck as the day goes on. He does like to fish a lot. He would like to not have to go to bed by 6pm. He has a hard time to take his grand kids at night to do something because the pain is so bad by then. He has to plan everything early, he knows he wont be able to do it later. He could  like to be able to cough without a headache. He would like to be able to fish and do all of normal activities without pain.     Patient Goals  Patient goals for therapy: decreased pain, increased motion, increased strength, independence with ADLs/IADLs and return to sport/leisure activities    Pain  Current pain ratin  At best pain ratin  At worst pain ratin  Location: top middle of neck and into head  Quality: sharp and dull ache  Relieving factors: rest  Exacerbated by: worse as the day goes on.  Progression: worsening      Diagnostic Tests  X-ray: abnormal  Treatments  No previous or current treatments        Objective     Static Posture     Head  Forward.    Shoulders  Rounded.    Palpation   Left   No palpable tenderness to the cervical paraspinals, levator scapulae, lower trapezius, middle trapezius, suboccipitals and upper trapezius.   Hypertonic in the levator scapulae and upper trapezius.     Right   No palpable tenderness to the cervical paraspinals, levator scapulae, lower trapezius, middle trapezius, suboccipitals and upper trapezius.   Hypertonic in the levator scapulae and upper trapezius.     Tenderness   Cervical Spine   No tenderness in the spinous process, left transverse process and right transverse process.     Active Range of Motion   Cervical/Thoracic Spine       Cervical    Flexion:  WFL  Extension: 40 degrees      Left lateral flexion: 30 degrees      Right lateral flexion: 25 degrees      Left rotation: 60 degrees  Right rotation: 50 degrees       Additional Active Range of Motion Details  Pt notes to feel the area that is usually painful for him with extension it is not necessarily a pain    Passive Range of Motion   Cervical/Thoracic Spine   Cervical     Flexion (degrees):  WFL  Extension (degrees):  Restriction level: moderate  Left lateral flexion (degrees):  Restriction level: minimal  Right lateral flexion (degrees):  Restriction level: minimal  Left rotation (degrees):   Restriction level: minimal  Right rotation (degrees):  Restriction level: minimal    Strength/Myotome Testing   Cervical Spine   Neck extension: 5  Neck flexion: 5    Left   Neck lateral flexion (C3): 5    Right   Neck lateral flexion (C3): 5    Left Shoulder     Planes of Motion   Flexion: 5   Extension: 5   Abduction: 5   Adduction: 5   External rotation at 0°: 5   Internal rotation at 0°: 5     Right Shoulder     Planes of Motion   Flexion: 5   Extension: 5   Abduction: 5   Adduction: 5   External rotation at 0°: 5   Internal rotation at 0°: 5                   Precautions: HTN, CAD  HEP:[id number]       Manuals PT Eval 3-11-25       C/s PROM and gentle traction 6'                                Neuro Re-Ed         Upper trap stretch         Levator stretch         C/s Retractions        Doorway stretch         Scap squeeze         Thoracic ext                 Ther Ex        UBE        MTP/LTP        Supine horizontal abd         Shoulder rolls         Banded c/s retraction Not yet                       Pt Edu Pt education on eval findings, plan for POC, issued HEP. Cervicogenic handout provided Provide proper desk set up handout NV      Ther Activity                        Gait Training                        Modalities        MHP/CP

## 2025-03-13 ENCOUNTER — OFFICE VISIT (OUTPATIENT)
Dept: PHYSICAL THERAPY | Facility: CLINIC | Age: 70
End: 2025-03-13
Payer: COMMERCIAL

## 2025-03-13 DIAGNOSIS — M54.2 CERVICALGIA: Primary | ICD-10-CM

## 2025-03-13 PROCEDURE — 97112 NEUROMUSCULAR REEDUCATION: CPT

## 2025-03-13 PROCEDURE — 97140 MANUAL THERAPY 1/> REGIONS: CPT

## 2025-03-13 PROCEDURE — 97110 THERAPEUTIC EXERCISES: CPT

## 2025-03-13 NOTE — PROGRESS NOTES
"Daily Note     Today's date: 3/13/2025  Patient name: Júnior Lee III  : 1955  MRN: [id number]  Referring provider: Sonido Giang PA-C  Dx:   Encounter Diagnosis     ICD-10-CM    1. Cervicalgia  M54.2                      Subjective: Pt reports that he tried the HEP. He feels about the same still. He has a headache today.       Objective: See treatment diary below      Assessment: Tolerated treatment well. Initiated program today and tolerated well. Pt did note while doing manual that this seemed to help last time. He reported no change to his HA post session. Reported to continue HEP at this time. Discuss possible allergies or sinus issued NV, to identify if this could possibly be related to his headaches. Patient would benefit from continued PT      Plan: Continue per plan of care.         Precautions: HTN, CAD  HEP:[id number]       Manuals PT Eval 3--25 3-25      C/s PROM and gentle traction 6'  8' with psp STM                              Neuro Re-Ed         Upper trap stretch   10\"x10 jai       Levator stretch   10\"x10 jai       C/s Retractions  2\"x10      Doorway stretch   10\"x10 arms low       Scap squeeze   5\"x20 standing       Thoracic ext   5\"x10 min thoracis, one location today               Ther Ex        UBE  8' alt UBE      MTP/LTP  Blk 3x10 each       Supine horizontal abd         Shoulder rolls   Bwd x30       Banded c/s retraction Not yet                       Pt Edu Pt education on eval findings, plan for POC, issued HEP. Cervicogenic handout provided Provide proper desk set up handout NV      Ther Activity                        Gait Training                        Modalities        MHP/CP  Deferred                         "

## 2025-03-17 ENCOUNTER — OFFICE VISIT (OUTPATIENT)
Dept: PHYSICAL THERAPY | Facility: CLINIC | Age: 70
End: 2025-03-17
Payer: COMMERCIAL

## 2025-03-17 ENCOUNTER — CONSULT (OUTPATIENT)
Dept: SURGERY | Facility: CLINIC | Age: 70
End: 2025-03-17
Payer: COMMERCIAL

## 2025-03-17 VITALS
SYSTOLIC BLOOD PRESSURE: 128 MMHG | DIASTOLIC BLOOD PRESSURE: 72 MMHG | TEMPERATURE: 97.7 F | OXYGEN SATURATION: 96 % | RESPIRATION RATE: 18 BRPM | HEART RATE: 60 BPM | HEIGHT: 68 IN | WEIGHT: 162.8 LBS | BODY MASS INDEX: 24.67 KG/M2

## 2025-03-17 DIAGNOSIS — D36.7 CYST, DERMOID, ARM, LEFT: ICD-10-CM

## 2025-03-17 DIAGNOSIS — M54.2 CERVICALGIA: Primary | ICD-10-CM

## 2025-03-17 PROCEDURE — 97112 NEUROMUSCULAR REEDUCATION: CPT

## 2025-03-17 PROCEDURE — 99203 OFFICE O/P NEW LOW 30 MIN: CPT | Performed by: STUDENT IN AN ORGANIZED HEALTH CARE EDUCATION/TRAINING PROGRAM

## 2025-03-17 PROCEDURE — 97110 THERAPEUTIC EXERCISES: CPT

## 2025-03-17 NOTE — PROGRESS NOTES
Name: Júnior Lee III      : 1955      MRN: [id number]  Encounter Provider: Keith Romano DO  Encounter Date: 3/17/2025   Encounter department: [facility]  :  Assessment & Plan  Cyst, dermoid, arm, left  69-year-old male with left shoulder sebaceous cyst  -See HPI  -Left shoulder area of scarring roughly 1.5 x 1.5 cm, no signs of infection  -CMP from 2024 reviewed  -Primary care physician note from 2025 reviewed  -Patient states a large amount of debris came out of the cyst  -Discussed with patient that he had a sebaceous cyst that became infected and drained  -Sometimes the cyst sac will come out with the infection and drainage if the inflammation is very significant  -Discussed excision of the area and scarring versus continued observation  -The area is scarred down well without a significant bump, unknown if the sac is still present  -At this time patient states he will continue to monitor the area, if the sac starts to fill back up he will call the office for reevaluation and excision  -Follow-up in office as needed    Orders:    Ambulatory Referral to General Surgery        History of Present Illness   HPI  Júnior Lee III is a 69 y.o. male who presents for evaluation of a left shoulder sebaceous cyst.  Patient notes it has been there for 3 to 4 years and is slowly gotten bigger over time.  Recently it became infected and drained a significant amount.  He was started on antibiotics by his primary care physician.  Since then it has healed and scarred down well.  History obtained from: patient    Review of Systems   Constitutional:  Negative for chills, fatigue and fever.   HENT:  Negative for congestion, hearing loss, rhinorrhea and sore throat.    Eyes:  Negative for pain and discharge.   Respiratory:  Negative for cough, chest tightness and shortness of breath.    Cardiovascular:  Negative for chest pain and palpitations.   Gastrointestinal:  Negative for  abdominal pain, constipation, diarrhea, nausea and vomiting.   Endocrine: Negative for cold intolerance and heat intolerance.   Genitourinary:  Negative for difficulty urinating and dysuria.   Musculoskeletal:  Negative for back pain and neck pain.   Skin:  Negative for color change and rash.        Positive infected left shoulder sebaceous cyst   Allergic/Immunologic: Negative for environmental allergies and food allergies.   Neurological:  Negative for seizures and headaches.   Hematological:  Does not bruise/bleed easily.   Psychiatric/Behavioral:  Negative for confusion and hallucinations.      Medical History Reviewed by provider this encounter:  Tobacco  Allergies  Meds  Problems  Med Hx  Surg Hx  Fam Hx     .  Past Medical History   Past Medical History:   Diagnosis Date    GERD (gastroesophageal reflux disease)     Hyperlipidemia     Hypertension      Past Surgical History:   Procedure Laterality Date    CARDIAC CATHETERIZATION N/A 04/27/2022    Procedure: Cardiac catheterization;  Surgeon: Farzad Walker MD;  Location: [facility];  Service: Cardiology    CARDIAC CATHETERIZATION Left 04/27/2022    Procedure: Cardiac Left Heart Cath;  Surgeon: Farzad Walker MD;  Location: [facility];  Service: Cardiology    COLONOSCOPY N/A     polyps removed    HERNIA REPAIR      WISDOM TOOTH EXTRACTION       Family History   Problem Relation Age of Onset    Cancer Mother     Heart attack Father         passed at 61    Cancer Sister       reports that he quit smoking about 13 years ago. His smoking use included cigarettes. He has never used smokeless tobacco. He reports that he does not currently use alcohol. He reports current drug use. Drug: Marijuana.  Current Outpatient Medications   Medication Instructions    aspirin (ASPIRIN LOW DOSE) 81 mg, Oral, Daily    atorvastatin (LIPITOR) 40 mg, Oral, Daily    isosorbide mononitrate (IMDUR) 30 mg, Oral, Daily    lansoprazole (PREVACID) 30 mg, Oral,  "Daily    metoprolol succinate (TOPROL-XL) 25 mg, Oral, Daily    nitroglycerin (NITROSTAT) 0.4 mg, Sublingual, Every 5 minutes PRN   No Known Allergies   Current Outpatient Medications on File Prior to Visit   Medication Sig Dispense Refill    aspirin (Aspirin Low Dose) 81 mg EC tablet Take 1 tablet (81 mg total) by mouth daily 90 tablet 1    atorvastatin (LIPITOR) 40 mg tablet Take 1 tablet (40 mg total) by mouth daily 90 tablet 1    isosorbide mononitrate (IMDUR) 30 mg 24 hr tablet Take 1 tablet (30 mg total) by mouth daily 90 tablet 1    lansoprazole (PREVACID) 30 mg capsule TAKE 1 CAPSULE DAILY 90 capsule 1    metoprolol succinate (TOPROL-XL) 25 mg 24 hr tablet Take 1 tablet (25 mg total) by mouth daily 90 tablet 1    nitroglycerin (NITROSTAT) 0.4 mg SL tablet Place 1 tablet (0.4 mg total) under the tongue every 5 (five) minutes as needed for chest pain 100 tablet 0     No current facility-administered medications on file prior to visit.      Social History     Tobacco Use    Smoking status: Former     Current packs/day: 0.00     Types: Cigarettes     Quit date:      Years since quittin.2    Smokeless tobacco: Never   Vaping Use    Vaping status: Every Day    Substances: Nicotine   Substance and Sexual Activity    Alcohol use: Not Currently     Comment: recovering alcoholic    Drug use: Yes     Types: Marijuana     Comment: medical marijuana card    Sexual activity: Not Currently        Objective   /72 (BP Location: Left arm, Patient Position: Sitting, Cuff Size: Standard)   Pulse 60   Temp 97.7 °F (36.5 °C)   Resp 18   Ht 5' 8\" (1.727 m)   Wt 73.8 kg (162 lb 12.8 oz)   SpO2 96%   BMI 24.75 kg/m²      Physical Exam  Constitutional:       Appearance: Normal appearance.   HENT:      Head: Normocephalic and atraumatic.      Nose: Nose normal.   Eyes:      General: No scleral icterus.     Conjunctiva/sclera: Conjunctivae normal.   Cardiovascular:      Rate and Rhythm: Normal rate and regular " rhythm.      Heart sounds: Normal heart sounds.   Pulmonary:      Effort: Pulmonary effort is normal.      Breath sounds: Normal breath sounds.   Musculoskeletal:         General: No signs of injury.   Skin:     General: Skin is warm.      Coloration: Skin is not jaundiced.      Comments: Left shoulder area of scarring roughly 1.5 x 1.5 cm, no signs of infection   Neurological:      General: No focal deficit present.      Mental Status: He is alert and oriented to person, place, and time.   Psychiatric:         Mood and Affect: Mood normal.         Behavior: Behavior normal.

## 2025-03-17 NOTE — PROGRESS NOTES
"Daily Note     Today's date: 3/17/2025  Patient name: Júnior Lee III  : 1955  MRN: [id number]  Referring provider: Sonido Giang PA-C  Dx:   Encounter Diagnosis     ICD-10-CM    1. Cervicalgia  M54.2                      Subjective: Pt reports he feels slight improvements. He still has the headaches that last 5-10 minutes after coughing or sneezing but less neck pain overall. He felt he was able to go a little longer in the evening before getting the neck pain. He feels the manuals here are helping loosen it up at at the top of his neck.       Objective: See treatment diary below      Assessment: Tolerated treatment well. Pt tolerated all exercises well. No pain reported with exercises this session. Pt reported to feel a little better post manuals. Educated pt on taking breaks with desk work at home to minimize any c/s stiffness. Continue to progress as able. Patient would benefit from continued PT      Plan: Progress treatment as tolerated.       Precautions: HTN, CAD  HEP:[id number]       Manuals PT Eval 3-11-25 3-13-25 3-17-25     C/s PROM and gentle traction 6'  8' with psp STM 8' with psp STM and upper trap in supine with gentle PROM    Do some with cream in seated NV                             Neuro Re-Ed         Upper trap stretch   10\"x10 jai  10\"x10 jai     Levator stretch   10\"x10 jai  10\"x10 jai     C/s Retractions  2\"x10 2\"x10     Doorway stretch   10\"x10 arms low  10\"x12 arms low      Scap squeeze   5\"x20 standing  5\"x20 standing      Thoracic ext   5\"x10 min thoracis, one location today  5\"x10 at one location              Ther Ex        UBE  8' alt UBE 10' alt      MTP/LTP  Blk 3x10 each  Blk and green doubled up 3x10 each      Supine horizontal abd         Shoulder rolls   Bwd x30  Bwd x30     Banded c/s retraction Not yet       Webslide face pull   Blk 2x10              Pt Edu Pt education on eval findings, plan for POC, issued HEP. Cervicogenic handout provided Provide proper desk set up " handout NV      Ther Activity                        Gait Training                        Modalities        MHP/CP  Deferred  Deferred

## 2025-03-17 NOTE — ASSESSMENT & PLAN NOTE
69-year-old male with left shoulder sebaceous cyst  -See HPI  -Left shoulder area of scarring roughly 1.5 x 1.5 cm, no signs of infection  -CMP from 11/26/2024 reviewed  -Primary care physician note from 2/11/2025 reviewed  -Patient states a large amount of debris came out of the cyst  -Discussed with patient that he had a sebaceous cyst that became infected and drained  -Sometimes the cyst sac will come out with the infection and drainage if the inflammation is very significant  -Discussed excision of the area and scarring versus continued observation  -The area is scarred down well without a significant bump, unknown if the sac is still present  -At this time patient states he will continue to monitor the area, if the sac starts to fill back up he will call the office for reevaluation and excision  -Follow-up in office as needed    Orders:    Ambulatory Referral to General Surgery

## 2025-03-19 ENCOUNTER — OFFICE VISIT (OUTPATIENT)
Dept: PHYSICAL THERAPY | Facility: CLINIC | Age: 70
End: 2025-03-19
Payer: COMMERCIAL

## 2025-03-19 DIAGNOSIS — M54.2 CERVICALGIA: Primary | ICD-10-CM

## 2025-03-19 PROCEDURE — 97110 THERAPEUTIC EXERCISES: CPT

## 2025-03-19 PROCEDURE — 97112 NEUROMUSCULAR REEDUCATION: CPT

## 2025-03-19 NOTE — PROGRESS NOTES
"Daily Note     Today's date: 3/19/2025  Patient name: Júnior Lee III  : 1955  MRN: [id number]  Referring provider: Sonido Giang PA-C  Dx:   Encounter Diagnosis     ICD-10-CM    1. Cervicalgia  M54.2                      Subjective: Pt reports that he feels his neck is getting better and looser but not the headaches. He did a lot yesterday so the neck is tight today.       Objective: See treatment diary below      Assessment: Tolerated treatment well. Pt tolerated all exercises well. Added more manuals today to decrease cervical muscular tightness. Pt reported to feel looser and less tight post session. Continued improvements in ROM and soft tissue quality. Continue to progress pt as able. Patient would benefit from continued PT      Plan: Progress treatment as tolerated.       Precautions: HTN, CAD  HEP:[id number]       Manuals PT Eval 3-11-25 3-13-25 3-17-25 3-19-25    C/s PROM and gentle traction 6'  8' with psp STM 8' with psp STM and upper trap in supine with gentle PROM    Do some with cream in seated NV Seated c/s and upper trap STM with cream 6'    Supine STM and gentle suboccipital release with PROM 6'                            Neuro Re-Ed         Upper trap stretch   10\"x10 jai  10\"x10 jai 10\"x10 jai     Levator stretch   10\"x10 jai  10\"x10 jai 10\"x10 jai     C/s Retractions  2\"x10 2\"x10 2\"x10 tucks and retractions     Doorway stretch   10\"x10 arms low  10\"x12 arms low  10\"x12 arms low     Scap squeeze   5\"x20 standing  5\"x20 standing  5\"x20    Thoracic ext   5\"x10 min thoracis, one location today  5\"x10 at one location  5\"x10 at one location             Ther Ex        UBE  8' alt UBE 10' alt  10' alt Lv1    MTP/LTP  Blk 3x10 each  Blk and green doubled up 3x10 each  Blk and green doubled up 3x10 each     Supine horizontal abd         Shoulder rolls   Bwd x30  Bwd x30 Bwd after manual x30    Banded c/s retraction Not yet       Webslide face pull   Blk 2x10  Dbl blk and green 3x10             Pt " Edu Pt education on eval findings, plan for POC, issued HEP. Cervicogenic handout provided Provide proper desk set up handout NV      Ther Activity                        Gait Training                        Modalities        MHP/CP  Deferred  Deferred

## 2025-03-24 ENCOUNTER — OFFICE VISIT (OUTPATIENT)
Dept: PHYSICAL THERAPY | Facility: CLINIC | Age: 70
End: 2025-03-24
Payer: COMMERCIAL

## 2025-03-24 DIAGNOSIS — M54.2 CERVICALGIA: Primary | ICD-10-CM

## 2025-03-24 PROCEDURE — 97110 THERAPEUTIC EXERCISES: CPT

## 2025-03-24 PROCEDURE — 97140 MANUAL THERAPY 1/> REGIONS: CPT

## 2025-03-24 PROCEDURE — 97112 NEUROMUSCULAR REEDUCATION: CPT

## 2025-03-24 NOTE — PROGRESS NOTES
"Daily Note     Today's date: 3/24/2025  Patient name: Júnior Lee III  : 1955  MRN: [id number]  Referring provider: Sonido Giang PA-C  Dx:   Encounter Diagnosis     ICD-10-CM    1. Cervicalgia  M54.2                      Subjective: Pt notes that his neck seems to be getting better, his headaches are still present with coughing and sneezing.       Objective: See treatment diary below      Assessment: Tolerated treatment well. Pt tolerated all exercises well. Slight discomfort in L shoulder toward end of session, regress band color NV if needed. Pt reported to feel okay post session. Patient would benefit from continued PT      Plan: Progress treatment as tolerated.       Precautions: HTN, CAD  HEP:[id number]       Manuals PT Eval 3-11-25 3-13-25 3-17-25 3-19-25 3-24-25   C/s PROM and gentle traction 6'  8' with psp STM 8' with psp STM and upper trap in supine with gentle PROM    Do some with cream in seated NV Seated c/s and upper trap STM with cream 6'    Supine STM and gentle suboccipital release with PROM 6' Seated c/s and upper trap STM with cream 6'    Supine STM and gentle suboccipital release with PROM 6'                           Neuro Re-Ed         Upper trap stretch   10\"x10 jai  10\"x10 jai 10\"x10 jai  10\"x10 jai    Levator stretch   10\"x10 jai  10\"x10 jai 10\"x10 jai  10\"x10 jai    C/s Retractions  2\"x10 2\"x10 2\"x10 tucks and retractions  2\"x10 tucks and retractions   Doorway stretch   10\"x10 arms low  10\"x12 arms low  10\"x12 arms low  10\"x12 arms low    Scap squeeze   5\"x20 standing  5\"x20 standing  5\"x20 5\"x20   Thoracic ext   5\"x10 min thoracis, one location today  5\"x10 at one location  5\"x10 at one location  5\"x10 at 2 location            Ther Ex        UBE  8' alt UBE 10' alt  10' alt Lv1 10' alt Lv1   MTP/LTP  Blk 3x10 each  Blk and green doubled up 3x10 each  Blk and green doubled up 3x10 each  Blk and green doubled up 3x10 each    Supine horizontal abd         Shoulder rolls   Bwd x30  " Bwd x30 Bwd after manual x30 X20 bwd rolls    Banded c/s retraction Not yet       Webslide face pull   Blk 2x10  Dbl blk and green 3x10  Dbl blk and green 3x10            Pt Edu Pt education on eval findings, plan for POC, issued HEP. Cervicogenic handout provided Provide proper desk set up handout NV      Ther Activity                        Gait Training                        Modalities        MHP/CP  Deferred  Deferred

## 2025-03-26 ENCOUNTER — OFFICE VISIT (OUTPATIENT)
Dept: PHYSICAL THERAPY | Facility: CLINIC | Age: 70
End: 2025-03-26
Payer: COMMERCIAL

## 2025-03-26 DIAGNOSIS — M54.2 CERVICALGIA: Primary | ICD-10-CM

## 2025-03-26 PROCEDURE — 97110 THERAPEUTIC EXERCISES: CPT

## 2025-03-26 PROCEDURE — 97112 NEUROMUSCULAR REEDUCATION: CPT

## 2025-03-26 PROCEDURE — 97140 MANUAL THERAPY 1/> REGIONS: CPT

## 2025-03-26 NOTE — PROGRESS NOTES
"Daily Note     Today's date: 3/26/2025  Patient name: Júnior Lee III  : 1955  MRN: [id number]  Referring provider: Sonido Giang PA-C  Dx:   Encounter Diagnosis     ICD-10-CM    1. Cervicalgia  M54.2                      Subjective: Pt reports that his neck does seem to be getting better so he is happy about this.       Objective: See treatment diary below      Assessment: Tolerated treatment well. Pt tolerated all exercises well. Decreased time with manuals slightly today and begin to wean as appropriate. Added in foam roll up wall for more UE reaching and t/s mobility. Verba cues provided to ensure mechanics. Pt reported to feel good post session. Patient would benefit from continued PT      Plan: Progress treatment as tolerated.       Precautions: HTN, CAD  HEP:[id number]       Manuals 3-26-25 3-13-25 3-17-25 3-19-25 3-24-25   C/s PROM and gentle traction Seated c/s and upper trap STM with cream 4'    Supine STM and gentle suboccipital release with PROM 4' 8' with psp STM 8' with psp STM and upper trap in supine with gentle PROM    Do some with cream in seated NV Seated c/s and upper trap STM with cream 6'    Supine STM and gentle suboccipital release with PROM 6' Seated c/s and upper trap STM with cream 6'    Supine STM and gentle suboccipital release with PROM 6'                           Neuro Re-Ed         Upper trap stretch  10x10\" 10\"x10 jai  10\"x10 jai 10\"x10 jai  10\"x10 jai    Levator stretch  10x10\" 10\"x10 jai  10\"x10 jai 10\"x10 jai  10\"x10 jai    C/s Retractions 2\"x10 tucks and retractions 2\"x10 2\"x10 2\"x10 tucks and retractions  2\"x10 tucks and retractions   Doorway stretch  10\"x12 10\"x10 arms low  10\"x12 arms low  10\"x12 arms low  10\"x12 arms low    Scap squeeze  5\"x20 5\"x20 standing  5\"x20 standing  5\"x20 5\"x20   Thoracic ext  5\"x10 at 1 location  5\"x10 min thoracis, one location today  5\"x10 at one location  5\"x10 at one location  5\"x10 at 2 location    Foam roll up wall  10x       Ther " Ex        UBE 10' alt  8' alt UBE 10' alt  10' alt Lv1 10' alt Lv1   MTP/LTP Blk and green doubled up 3x10 each  Blk 3x10 each  Blk and green doubled up 3x10 each  Blk and green doubled up 3x10 each  Blk and green doubled up 3x10 each    Supine horizontal abd         Shoulder rolls  X30 bwd Bwd x30  Bwd x30 Bwd after manual x30 X20 bwd rolls    Banded c/s retraction Not yet       Webslide face pull Blk and green doubled up 3x10 each   Blk 2x10  Dbl blk and green 3x10  Dbl blk and green 3x10            Pt Edu  Provide proper desk set up handout NV      Ther Activity                        Gait Training                        Modalities        MHP/CP  Deferred  Deferred

## 2025-03-31 ENCOUNTER — OFFICE VISIT (OUTPATIENT)
Dept: PHYSICAL THERAPY | Facility: CLINIC | Age: 70
End: 2025-03-31
Payer: COMMERCIAL

## 2025-03-31 DIAGNOSIS — M54.2 CERVICALGIA: Primary | ICD-10-CM

## 2025-03-31 PROCEDURE — 97112 NEUROMUSCULAR REEDUCATION: CPT

## 2025-03-31 PROCEDURE — 97110 THERAPEUTIC EXERCISES: CPT

## 2025-03-31 PROCEDURE — 97140 MANUAL THERAPY 1/> REGIONS: CPT

## 2025-03-31 NOTE — PROGRESS NOTES
"Daily Note     Today's date: 3/31/2025  Patient name: Júnior Lee III  : 1955  MRN: [id number]  Referring provider: Sonido Giang PA-C  Dx:   Encounter Diagnosis     ICD-10-CM    1. Cervicalgia  M54.2                      Subjective: Pt reports that his headaches don't seem to be happening with every single cough or sneeze anymore but still often. The neck is feeling better.       Objective: See treatment diary below      Assessment: Tolerated treatment well. Only performed manuals in seated today, assess sx NV. Small nodule palpable in right psp with no tenderness or pain to. Pt reported band resistance is still a challenge, no progressions made. Pt reported to feel good post session. Patient would benefit from continued PT      Plan: Progress treatment as tolerated.       Precautions: HTN, CAD  HEP:[id number]       Manuals 3-26-25 3-31-25 3-17-25 3-19-25 3-24-25   C/s PROM and gentle traction Seated c/s and upper trap STM with cream 4'    Supine STM and gentle suboccipital release with PROM 4' Seated upper trap, middle trap, c/s psps and suboccipital STM 8' 8' with psp STM and upper trap in supine with gentle PROM    Do some with cream in seated NV Seated c/s and upper trap STM with cream 6'    Supine STM and gentle suboccipital release with PROM 6' Seated c/s and upper trap STM with cream 6'    Supine STM and gentle suboccipital release with PROM 6'                           Neuro Re-Ed         Upper trap stretch  10x10\" 10x10\" 10\"x10 jai 10\"x10 jai  10\"x10 jai    Levator stretch  10x10\" 10x10\" 10\"x10 jai 10\"x10 jai  10\"x10 jai    C/s Retractions 2\"x10 tucks and retractions 2\"x10 tucks and retractions 2\"x10 2\"x10 tucks and retractions  2\"x10 tucks and retractions   Doorway stretch  10\"x12 10\"x12  10\"x12 arms low  10\"x12 arms low  10\"x12 arms low    Scap squeeze  5\"x20 5\"x20 5\"x20 standing  5\"x20 5\"x20   Thoracic ext  5\"x10 at 1 location   5\"x10 at one location  5\"x10 at one location  5\"x10 at 2 location " "   Foam roll up wall  10x X10 2\"leaning in up top      Ther Ex        UBE 10' alt  10' alt Lv1 10' alt  10' alt Lv1 10' alt Lv1   MTP/LTP Blk and green doubled up 3x10 each  Blk and green doubled up 3x10 each  Blk and green doubled up 3x10 each  Blk and green doubled up 3x10 each  Blk and green doubled up 3x10 each    Supine horizontal abd         Shoulder rolls  X30 bwd  Bwd x30 Bwd after manual x30 X20 bwd rolls    Banded c/s retraction Not yet       Webslide face pull Blk and green doubled up 3x10 each  Blk and green doubled up 3x10 each  Blk 2x10  Dbl blk and green 3x10  Dbl blk and green 3x10            Pt Edu        Ther Activity                        Gait Training                        Modalities        MHP/CP   Deferred                                  "

## 2025-04-02 ENCOUNTER — OFFICE VISIT (OUTPATIENT)
Dept: PHYSICAL THERAPY | Facility: CLINIC | Age: 70
End: 2025-04-02
Payer: COMMERCIAL

## 2025-04-02 DIAGNOSIS — M54.2 CERVICALGIA: Primary | ICD-10-CM

## 2025-04-02 PROCEDURE — 97110 THERAPEUTIC EXERCISES: CPT

## 2025-04-02 PROCEDURE — 97112 NEUROMUSCULAR REEDUCATION: CPT

## 2025-04-02 NOTE — PROGRESS NOTES
"Daily Note     Today's date: 2025  Patient name: Júnior Lee III  : 1955  MRN: [id number]  Referring provider: Sonido Giang PA-C  Dx:   Encounter Diagnosis     ICD-10-CM    1. Cervicalgia  M54.2                      Subjective: Pt reports that he thinks he wants to continue a little more and then titer to 1x a week. Overall he is able to get to 9-10pm before having an increase in neck pain. This is most of his day. Prior to starting pt he was only clarence to get through his day till about 5-6 pm before a lot of neck pain.       Objective: See treatment diary below      Assessment: Tolerated treatment well. Pt tolerated all exercises well. VC provided to ensure proper technique with face pulls and to avoid upper trap compensations. Pt reported to feel good post session. Continue to progress as able.  Patient would benefit from continued PT      Plan: Progress treatment as tolerated.       Precautions: HTN, CAD  HEP:[id number]       Manuals 3-26-25 3-31-25 4-2-25 3-19-25 3-24-25   C/s PROM and gentle traction Seated c/s and upper trap STM with cream 4'    Supine STM and gentle suboccipital release with PROM 4' Seated upper trap, middle trap, c/s psps and suboccipital STM 8' Seated 8' upper trap c/s psp and suboccipitals Seated c/s and upper trap STM with cream 6'    Supine STM and gentle suboccipital release with PROM 6' Seated c/s and upper trap STM with cream 6'    Supine STM and gentle suboccipital release with PROM 6'                           Neuro Re-Ed         Upper trap stretch  10x10\" 10x10\" 10x10\" 10\"x10 jai  10\"x10 jai    Levator stretch  10x10\" 10x10\" 10x10\" 10\"x10 jai  10\"x10 jai    C/s Retractions 2\"x10 tucks and retractions 2\"x10 tucks and retractions 2\"x10 tucks and retractions 2\"x10 tucks and retractions  2\"x10 tucks and retractions   Doorway stretch  10\"x12 10\"x12  10\"x12 10\"x12 arms low  10\"x12 arms low    Scap squeeze  5\"x20 5\"x20  5\"x20 5\"x20   Thoracic ext  5\"x10 at 1 location    5\"x10 " "at one location  5\"x10 at 2 location    Foam roll up wall  10x X10 2\"leaning in up top X15 2\" leaning in up top      Ther Ex        UBE 10' alt  10' alt Lv1 Lv4 10' alt  10' alt Lv1 10' alt Lv1   MTP/LTP Blk and green doubled up 3x10 each  Blk and green doubled up 3x10 each  Blk and green doubled up 3x10 each  Blk and green doubled up 3x10 each  Blk and green doubled up 3x10 each    Supine horizontal abd         Shoulder rolls  X30 bwd   Bwd after manual x30 X20 bwd rolls    Banded c/s retraction Not yet  Add NV     Webslide face pull Blk and green doubled up 3x10 each  Blk and green doubled up 3x10 each  Blk and green doubled up 3x10 each  Dbl blk and green 3x10  Dbl blk and green 3x10            Pt Edu        Ther Activity                        Gait Training                        Modalities        MHP/CP                                      "

## 2025-04-07 ENCOUNTER — APPOINTMENT (OUTPATIENT)
Dept: PHYSICAL THERAPY | Facility: CLINIC | Age: 70
End: 2025-04-07
Payer: COMMERCIAL

## 2025-04-07 NOTE — PROGRESS NOTES
[facility]  [address] [place] [address]  Cardiology Follow Up    Júnior Lee III  1955  [id number]    Assessment & Plan  CAD, LAD stenting, April 2022  - He is asymptomatic.  Going to continue with the secondary preventive measures.  Primary hypertension  The blood pressure is well controlled with the current medications.  No side effect profile has been noted.    -DASH dietary pattern has been suggested.  Diet rich in fruits, vegetables, whole grains and low-fat dairy products with reduced content of had treated and total fat has been addressed.  The importance of blood pressure control in reducing the incidence of stroke, congestive heart failure and myocardial infarction has been discussed.    Mixed hyperlipidemia  -the last lipid profile has been reviewed.  Continue with current dose of statin therapy.  The importance of LDL goal has been discussed in great details.  - repeat lipid profile as per the guidelines.      Continue all medications. Previous studies reviewed with patient, medications reviewed and possible side effects discussed. Continue risk factor modification. Optimize weight, regular exercise and follow up with appropriate specialists and primary care physician as discussed.  All questions answered. Patient advised to report any problems prompting to medical attention. Return for follow up visit in 9 months or earlier if needed    Chief Complaint   Patient presents with   • Follow-up         Interval History: Júnior Lee III is a 69 y.o. male  with a history of coronary artery disease, status post LAD 2022 stenting who presents today for follow-up.    From the cardiac standpoint of view, Júnior Lee III denies any symptoms of having any chest pain, shortness of breath, palpitations, dizziness, or syncopal episodes.  There are no hospital admissions with his CAD, congestive heart failure or hypertensive urgency/emergency. He has been compliant with her  medications with no side effect profile noted. The exercise capacity is above average.    He does have the pain issue in the cervical spine and because of that reason at times he can get the pain radiation to the head and to the face.  I have reassured him that this is not cardiac related.      PMH:     Patient Active Problem List   Diagnosis   • Other chest pain   • Primary hypertension   • LOPEZ (dyspnea on exertion)   • Mixed hyperlipidemia   • CAD, LAD stenting, 2022   • Cyst, dermoid, arm, left     Past Medical History:   Diagnosis Date   • GERD (gastroesophageal reflux disease)    • Hyperlipidemia    • Hypertension      Social History     Socioeconomic History   • Marital status: Single     Spouse name: Not on file   • Number of children: Not on file   • Years of education: Not on file   • Highest education level: Not on file   Occupational History   • Not on file   Tobacco Use   • Smoking status: Former     Current packs/day: 0.00     Types: Cigarettes     Quit date:      Years since quittin.2   • Smokeless tobacco: Never   Vaping Use   • Vaping status: Every Day   • Substances: Nicotine   Substance and Sexual Activity   • Alcohol use: Not Currently     Comment: recovering alcoholic   • Drug use: Yes     Types: Marijuana     Comment: medical marijuana card   • Sexual activity: Not Currently   Other Topics Concern   • Not on file   Social History Narrative   • Not on file     Social Drivers of Health     Financial Resource Strain: Medium Risk (2023)    Overall Financial Resource Strain (CARDIA)    • Difficulty of Paying Living Expenses: Somewhat hard   Food Insecurity: No Food Insecurity (2024)    Nursing - Inadequate Food Risk Classification    • Worried About Running Out of Food in the Last Year: Never true    • Ran Out of Food in the Last Year: Never true    • Ran Out of Food in the Last Year: Not on file   Transportation Needs: No Transportation Needs (2024)    PRAPARE -  Transportation    • Lack of Transportation (Medical): No    • Lack of Transportation (Non-Medical): No   Physical Activity: Not on file   Stress: Not on file   Social Connections: Not on file   Intimate Partner Violence: Not on file   Housing Stability: Low Risk  (5/14/2024)    Housing Stability Vital Sign    • Unable to Pay for Housing in the Last Year: No    • Number of Times Moved in the Last Year: 1    • Homeless in the Last Year: No      Family History   Problem Relation Age of Onset   • Cancer Mother    • Heart attack Father         passed at 61   • Cancer Sister      Past Surgical History:   Procedure Laterality Date   • CARDIAC CATHETERIZATION N/A 04/27/2022    Procedure: Cardiac catheterization;  Surgeon: Farzad Walker MD;  Location: [facility];  Service: Cardiology   • CARDIAC CATHETERIZATION Left 04/27/2022    Procedure: Cardiac Left Heart Cath;  Surgeon: Farzad Walker MD;  Location: [facility];  Service: Cardiology   • COLONOSCOPY N/A     polyps removed   • HERNIA REPAIR     • WISDOM TOOTH EXTRACTION         Current Outpatient Medications:   •  aspirin (Aspirin Low Dose) 81 mg EC tablet, Take 1 tablet (81 mg total) by mouth daily, Disp: 90 tablet, Rfl: 1  •  atorvastatin (LIPITOR) 40 mg tablet, Take 1 tablet (40 mg total) by mouth daily, Disp: 90 tablet, Rfl: 1  •  isosorbide mononitrate (IMDUR) 30 mg 24 hr tablet, Take 1 tablet (30 mg total) by mouth daily, Disp: 90 tablet, Rfl: 1  •  lansoprazole (PREVACID) 30 mg capsule, TAKE 1 CAPSULE DAILY, Disp: 90 capsule, Rfl: 1  •  metoprolol succinate (TOPROL-XL) 25 mg 24 hr tablet, Take 1 tablet (25 mg total) by mouth daily, Disp: 90 tablet, Rfl: 1  •  nitroglycerin (NITROSTAT) 0.4 mg SL tablet, Place 1 tablet (0.4 mg total) under the tongue every 5 (five) minutes as needed for chest pain, Disp: 100 tablet, Rfl: 0  No Known Allergies        Review of Systems:  Review of Systems   Constitutional: Negative.  Negative for chills and  "fever.   Eyes:  Negative for visual disturbance.   Respiratory:  Negative for cough and shortness of breath.    Cardiovascular:  Negative for chest pain and palpitations.   Gastrointestinal:  Negative for abdominal pain and vomiting.   Skin:  Negative for color change and rash.   Neurological:  Negative for syncope.   All other systems reviewed and are negative.        /72 (BP Location: Left arm, Patient Position: Sitting, Cuff Size: Standard)   Pulse 73   Resp 16   Ht 5' 8\" (1.727 m)   Wt 73.5 kg (162 lb)   SpO2 97%   BMI 24.63 kg/m²     Physical Exam:  Physical Exam  Vitals reviewed.   Constitutional:       Appearance: Normal appearance.   HENT:      Head: Normocephalic.   Eyes:      Pupils: Pupils are equal, round, and reactive to light.   Cardiovascular:      Rate and Rhythm: Normal rate and regular rhythm.      Pulses: Normal pulses.      Heart sounds: Normal heart sounds.   Pulmonary:      Effort: Pulmonary effort is normal.      Breath sounds: Normal breath sounds. No wheezing.   Abdominal:      General: Abdomen is flat.      Palpations: Abdomen is soft.   Skin:     General: Skin is warm.   Neurological:      Mental Status: He is alert.       Time spent day 35 minutes in reviewing outpatient notes, reviewing and interpreting labs,  reviewing interpreting EKG and other cardiac studies including the cardiac cath, discussion and educating patient, documentation.     "

## 2025-04-08 ENCOUNTER — OFFICE VISIT (OUTPATIENT)
Dept: CARDIOLOGY CLINIC | Facility: CLINIC | Age: 70
End: 2025-04-08
Payer: COMMERCIAL

## 2025-04-08 VITALS
SYSTOLIC BLOOD PRESSURE: 130 MMHG | OXYGEN SATURATION: 97 % | DIASTOLIC BLOOD PRESSURE: 72 MMHG | WEIGHT: 162 LBS | RESPIRATION RATE: 16 BRPM | HEIGHT: 68 IN | HEART RATE: 73 BPM | BODY MASS INDEX: 24.55 KG/M2

## 2025-04-08 DIAGNOSIS — I25.10 CORONARY ARTERY DISEASE INVOLVING NATIVE CORONARY ARTERY OF NATIVE HEART WITHOUT ANGINA PECTORIS: Primary | ICD-10-CM

## 2025-04-08 DIAGNOSIS — E78.2 MIXED HYPERLIPIDEMIA: ICD-10-CM

## 2025-04-08 DIAGNOSIS — I10 PRIMARY HYPERTENSION: ICD-10-CM

## 2025-04-08 PROCEDURE — 99214 OFFICE O/P EST MOD 30 MIN: CPT | Performed by: INTERNAL MEDICINE

## 2025-04-08 NOTE — ASSESSMENT & PLAN NOTE
The blood pressure is well controlled with the current medications.  No side effect profile has been noted.    -DASH dietary pattern has been suggested.  Diet rich in fruits, vegetables, whole grains and low-fat dairy products with reduced content of had treated and total fat has been addressed.  The importance of blood pressure control in reducing the incidence of stroke, congestive heart failure and myocardial infarction has been discussed.

## 2025-04-08 NOTE — ASSESSMENT & PLAN NOTE
-the last lipid profile has been reviewed.  Continue with current dose of statin therapy.  The importance of LDL goal has been discussed in great details.  - repeat lipid profile as per the guidelines.

## 2025-04-09 ENCOUNTER — OFFICE VISIT (OUTPATIENT)
Dept: PHYSICAL THERAPY | Facility: CLINIC | Age: 70
End: 2025-04-09
Payer: COMMERCIAL

## 2025-04-09 DIAGNOSIS — M54.2 CERVICALGIA: Primary | ICD-10-CM

## 2025-04-09 PROCEDURE — 97112 NEUROMUSCULAR REEDUCATION: CPT

## 2025-04-09 PROCEDURE — 97110 THERAPEUTIC EXERCISES: CPT

## 2025-04-09 NOTE — PROGRESS NOTES
"Daily Note     Today's date: 2025  Patient name: Júnior Lee III  : 1955  MRN: [id number]  Referring provider: Sonido Giang PA-C  Dx:   Encounter Diagnosis     ICD-10-CM    1. Cervicalgia  M54.2                      Subjective: Pt reports that he is doing pretty good today. He has not been having the sharp pain as much in his neck, the frequency seems to be getting better but still there.       Objective: See treatment diary below      Assessment: Tolerated treatment well. Pt tolerated all exercises well. Minimal verbal cues provided to ensure proper mechanics with banded exercises. Increased tightness noted in R side upper traps and suboccipitals that improved with STM. Plan for re-eval NV. Patient would benefit from continued PT      Plan: Progress treatment as tolerated.       Precautions: HTN, CAD  HEP:[id number]       Manuals 3-26-25 3-31-25 4-2-25 4-9-25 3-24-25   C/s PROM and gentle traction Seated c/s and upper trap STM with cream 4'    Supine STM and gentle suboccipital release with PROM 4' Seated upper trap, middle trap, c/s psps and suboccipital STM 8' Seated 8' upper trap c/s psp and suboccipitals Seated 10' upper trap c/s psp and suboccipitals Seated c/s and upper trap STM with cream 6'    Supine STM and gentle suboccipital release with PROM 6'                           Neuro Re-Ed         Upper trap stretch  10x10\" 10x10\" 10x10\" 10x10\" 10\"x10 jai    Levator stretch  10x10\" 10x10\" 10x10\" 10x10\" 10\"x10 jai    C/s Retractions 2\"x10 tucks and retractions 2\"x10 tucks and retractions 2\"x10 tucks and retractions 2\"x10 tucks and retractions 2\"x10 tucks and retractions   Doorway stretch  10\"x12 10\"x12  10\"x12 10\"x12  10\"x12 arms low    Scap squeeze  5\"x20 5\"x20   5\"x20   Thoracic ext  5\"x10 at 1 location     5\"x10 at 2 location    Foam roll up wall  10x X10 2\"leaning in up top X15 2\" leaning in up top  X15 2\" leaning in up top     Ther Ex        UBE 10' alt  10' alt Lv1 Lv4 10' alt  Lv4 10' alt  " 10' alt Lv1   MTP/LTP Blk and green doubled up 3x10 each  Blk and green doubled up 3x10 each  Blk and green doubled up 3x10 each  Blk and green doubled up 3x10 each  Blk and green doubled up 3x10 each    Supine horizontal abd         Shoulder rolls  X30 bwd    X20 bwd rolls    Banded c/s retraction Not yet  Add NV Add NV    Webslide face pull Blk and green doubled up 3x10 each  Blk and green doubled up 3x10 each  Blk and green doubled up 3x10 each  Blk and green doubled up 3x10 each  Dbl blk and green 3x10            Pt Edu        Ther Activity                        Gait Training                        Modalities        MHP/CP

## 2025-04-11 ENCOUNTER — EVALUATION (OUTPATIENT)
Dept: PHYSICAL THERAPY | Facility: CLINIC | Age: 70
End: 2025-04-11
Payer: COMMERCIAL

## 2025-04-11 DIAGNOSIS — M54.2 CERVICALGIA: Primary | ICD-10-CM

## 2025-04-11 PROCEDURE — 97110 THERAPEUTIC EXERCISES: CPT

## 2025-04-11 PROCEDURE — 97112 NEUROMUSCULAR REEDUCATION: CPT

## 2025-04-11 NOTE — PROGRESS NOTES
PT Re-Evaluation     Today's date: 2025  Patient name: Júnior Lee III  : 1955  MRN: [id number]  Referring provider: Sonido Giang PA-C  Dx:   Encounter Diagnosis     ICD-10-CM    1. Cervicalgia  M54.2                      Assessment  Impairments: abnormal or restricted ROM, impaired physical strength, pain with function, poor posture , poor body mechanics and activity limitations  Symptom irritability: moderate    Assessment details: Patient is a 69 year old male presenting to this facility with complaints of neck pain that radiates to his head. This has been going on for about 5-6 months now. Pt has goals to return to fishing and all of his normal activities, even coughing and sneezing w/o increased pain. Upon evaluation, patient presented with limitations in cervical ROM and increased tone and tension in upper traps and levators. Slight fwd head and rounded shoulders noted in seated posture. UE and cervical strength was good. No radicular symptoms present. Discussed proper posture throughout the day at his desk. Patient was issued HEP to begin targeting posture and ROM deficits. Patient would benefit from skilled physical therapy to address deficits noted above in order to decrease pain, improve functional mobility, and to maximize independence with ADLs.    PT Re-Evaluation 25  Patient is a 69 year old male being treated at this facility with complaints of neck pain that radiates to his head. This has been going on for about 5-6 months now. Pt has goals to return to fishing and all of his normal activities, even coughing and sneezing w/o increased pain. Since starting PT, patient reports that he is able to get through the majority of the day without neck pain. He used to get it around 5-6pm, and now he can make it to at least 9-10 pm before getting any pain. Pt also reports that when he coughs or sneezes, he doesn't feel the sharp pain or headache as frequent as he was, although it still  happens sometimes, maybe 50% of the time now vs 100%. Upon re-evaluation, patient demonstrated improvements in cervical ROM and he reports less tension. Less palpable tissue tension, however hypertonic suboccipitals, more in R side. Pt describes cervicogenic headaches toward end of day, discussed and educated again on his computer set up and taking frequent breaks with this as he spends hours a day at his computer. He has tried fishing and this went fine, he was happy with this. He remains optimistic as he continues to have improvements. He wishes the HA would lessen still and less neck pain. At this time, will plan to reduce frequency of PT to 1x weekly to allow for more independence with program and continue addressing remaining deficits in order to decrease pain and difficulty with ADLs.   Understanding of Dx/Px/POC: good     Prognosis: good    Goals  ST.  Decreased pain 50% 6 wk   2.  Decrease headaches 50% 6 wk  3.  Increase c-spine ROM 5 degrees  6wk  4.  Pt will report no difficulty with light ADL's 6 wk (80% met)    LT.  Pt will report no pain 12 wk  2.  Pt will report no headaches 12 wk  3.  Increase c-spine AROM to WNL 12 wk  4.  Increase c-spine strength to WNL 12 wk  5.  Pt will report no limitations with ADL's 12 wk  6. Pt will report ability to fish with no difficulty or neck pain 12 wk 9 (50% met)    Plan  Patient would benefit from: PT eval and skilled physical therapy  Planned modality interventions: cryotherapy and thermotherapy: hydrocollator packs    Planned therapy interventions: ADL retraining, body mechanics training, flexibility, functional ROM exercises, graded exercise, home exercise program, manual therapy, neuromuscular re-education, patient/caregiver education, postural training, self care, strengthening, stretching, therapeutic activities and therapeutic exercise    Frequency: 1-2x week  Duration in weeks: 12  Treatment plan discussed with: patient  Plan details: Continue with  skilled PT at this time         Subjective Evaluation    History of Present Illness  Mechanism of injury: Patient is a 69 year old male presenting to this facility with complaints of neck pain that has been going on since the end of October. He described this as soreness that gets worse throughout the day and is becoming more unbearable with time. He thought it would fix itself but it did not. No injury or anything that started the pain. He reports waking up one day with the pain. He did get new pillows so he thought it was the reason. He tried changing them back and the pain never went away. Pain is in the top middle of his neck. 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 When he coughs or sneezes, he gets pain shooting to the front of his head. Sometimes if he moves the wrong way he gets a pain.He reports the pain used to refer to the front of the head and then go away, now it seems to be lingering around. Denies numbness or tingling into the hands. He denies weakness into the arms. Denies vision changes. He has tried tylenol and this helps a little. He tried heat and a pain cream but this only helps psychologically maybe. He notices some stiffness in the neck as the day goes on. He does like to fish a lot. He would like to not have to go to bed by 6pm. He has a hard time to take his grand kids at night to do something because the pain is so bad by then. He has to plan everything early, he knows he wont be able to do it later. He could like to be able to cough without a headache. He would like to be able to fish and do all of normal activities without pain.     Patient Goals  Patient goals for therapy: decreased pain, increased motion, increased strength, independence with ADLs/IADLs and return to sport/leisure activities    Pain  Current pain ratin  At best pain ratin  At worst pain ratin  Location: top middle of neck and into head  Quality: sharp and dull ache  Relieving  "factors: rest (tylenol)  Exacerbated by: worse as the day goes on.  Progression: improved      Diagnostic Tests  X-ray: abnormal  Treatments  No previous or current treatments        Objective     Static Posture     Head  Forward.    Shoulders  Rounded.    Palpation   Left   No palpable tenderness to the cervical paraspinals, levator scapulae, lower trapezius, middle trapezius, suboccipitals and upper trapezius.     Right   No palpable tenderness to the cervical paraspinals, levator scapulae, lower trapezius, middle trapezius, suboccipitals and upper trapezius.   Hypertonic in the suboccipitals.     Tenderness   Cervical Spine   No tenderness in the spinous process, left transverse process and right transverse process.     Active Range of Motion   Cervical/Thoracic Spine       Cervical    Flexion:  WFL  Extension: 55 degrees      Left lateral flexion: 30 degrees      Right lateral flexion: 30 degrees      Left rotation: 70 degrees  Right rotation: 60 degrees       Strength/Myotome Testing   Cervical Spine   Neck extension: 5  Neck flexion: 5    Left   Neck lateral flexion (C3): 5    Right   Neck lateral flexion (C3): 5    Left Shoulder     Planes of Motion   Flexion: 5   Extension: 5   Abduction: 5   Adduction: 5   External rotation at 0°: 5   Internal rotation at 0°: 5     Right Shoulder     Planes of Motion   Flexion: 5   Extension: 5   Abduction: 5   Adduction: 5   External rotation at 0°: 5   Internal rotation at 0°: 5                   Precautions: HTN, CAD  HEP:[id number]       Manuals 3-26-25 3-31-25 4-2-25 4-9-25 3-33-96Az-Eval   C/s PROM and gentle traction Seated c/s and upper trap STM with cream 4'    Supine STM and gentle suboccipital release with PROM 4' Seated upper trap, middle trap, c/s psps and suboccipital STM 8' Seated 8' upper trap c/s psp and suboccipitals Seated 10' upper trap c/s psp and suboccipitals                            Neuro Re-Ed         Upper trap stretch  10x10\" 10x10\" 10x10\" 10x10\" " "10x10\"   Levator stretch  10x10\" 10x10\" 10x10\" 10x10\" 10x10\"   C/s Retractions 2\"x10 tucks and retractions 2\"x10 tucks and retractions 2\"x10 tucks and retractions 2\"x10 tucks and retractions 2\"x10 tucks and retractions   Doorway stretch  10\"x12 10\"x12  10\"x12 10\"x12  10\"x12    Scap squeeze  5\"x20 5\"x20      Thoracic ext  5\"x10 at 1 location        Foam roll up wall  10x X10 2\"leaning in up top X15 2\" leaning in up top  X15 2\" leaning in up top     Ther Ex        UBE 10' alt  10' alt Lv1 Lv4 10' alt  Lv4 10' alt  Lv4 10' alt    MTP/LTP Blk and green doubled up 3x10 each  Blk and green doubled up 3x10 each  Blk and green doubled up 3x10 each  Blk and green doubled up 3x10 each  Blk and green doubled up 3x10 each    Supine horizontal abd         Shoulder rolls  X30 bwd       Banded c/s retraction Not yet  Add NV Add NV Red in chair x15   Webslide face pull Blk and green doubled up 3x10 each  Blk and green doubled up 3x10 each  Blk and green doubled up 3x10 each  Blk and green doubled up 3x10 each  Blk and green doubled up 3x10 each            Pt Edu     Provided with green, blue, black band    Ther Activity                        Gait Training                        Modalities        MHP/CP                                   "

## 2025-04-16 ENCOUNTER — OFFICE VISIT (OUTPATIENT)
Dept: PHYSICAL THERAPY | Facility: CLINIC | Age: 70
End: 2025-04-16
Payer: COMMERCIAL

## 2025-04-16 DIAGNOSIS — M54.2 CERVICALGIA: Primary | ICD-10-CM

## 2025-04-16 PROCEDURE — 97112 NEUROMUSCULAR REEDUCATION: CPT

## 2025-04-16 PROCEDURE — 97110 THERAPEUTIC EXERCISES: CPT

## 2025-04-16 NOTE — PROGRESS NOTES
"Daily Note     Today's date: 2025  Patient name: Júnior Lee III  : 1955  MRN: [id number]  Referring provider: Sonido Giang PA-C  Dx:   Encounter Diagnosis     ICD-10-CM    1. Cervicalgia  M54.2                      Subjective: Júnior reports doing well overall since starting skilled PT in regards to c-spine pain. He reports decreased frequency and intensity of HA. Denies any radicular symptoms.       Objective: See treatment diary below      Assessment: Visual and VC to ensure correct exercise technique. Visual cues to correct face pull technique and good follow through. Denied any increases in pain with exercises performed. Ended with MHP to relax surrounding musculature in c-spine. Pt would continue to benefit from skilled PT.       Plan: Continue with current POC to address pt deficits.         Precautions: HTN, CAD  HEP:[id number]       Manuals 4-16-25 3-31-25 4-2-25 4-9-25 3-76-34Rr-Eval   C/s PROM and gentle traction Held-assess NV  Seated upper trap, middle trap, c/s psps and suboccipital STM 8' Seated 8' upper trap c/s psp and suboccipitals Seated 10' upper trap c/s psp and suboccipitals                            Neuro Re-Ed         Upper trap stretch  10\"x10 jai  10x10\" 10x10\" 10x10\" 10x10\"   Levator stretch  10x 10\" jai  10x10\" 10x10\" 10x10\" 10x10\"   C/s Retractions 2\"x10 tucks and retractions 2\"x10 tucks and retractions 2\"x10 tucks and retractions 2\"x10 tucks and retractions 2\"x10 tucks and retractions   Doorway stretch  10\"x12 10\"x12  10\"x12 10\"x12  10\"x12    Scap squeeze   5\"x20      Thoracic ext         Foam roll up wall  X15 2\" leaning in up top  X10 2\"leaning in up top X15 2\" leaning in up top  X15 2\" leaning in up top     Ther Ex        UBE Lv4 10' alt  10' alt Lv1 Lv4 10' alt  Lv4 10' alt  Lv4 10' alt    MTP/LTP Blk and green doubled up 3x10 each  Blk and green doubled up 3x10 each  Blk and green doubled up 3x10 each  Blk and green doubled up 3x10 each  Blk and green doubled up " 3x10 each    Supine horizontal abd         Shoulder rolls         Banded c/s retraction Red in chair x15  Add NV Add NV Red in chair x15   Webslide face pull Blk and green doubled up 3x10 each  Blk and green doubled up 3x10 each  Blk and green doubled up 3x10 each  Blk and green doubled up 3x10 each  Blk and green doubled up 3x10 each            Pt Edu Pt provided with updated HEP    Provided with green, blue, black band    Ther Activity                        Gait Training                        Modalities        MHP/CP MHP c-spine 10'

## 2025-04-23 ENCOUNTER — OFFICE VISIT (OUTPATIENT)
Dept: PHYSICAL THERAPY | Facility: CLINIC | Age: 70
End: 2025-04-23
Attending: PHYSICIAN ASSISTANT
Payer: COMMERCIAL

## 2025-04-23 DIAGNOSIS — K21.9 GASTROESOPHAGEAL REFLUX DISEASE WITHOUT ESOPHAGITIS: ICD-10-CM

## 2025-04-23 DIAGNOSIS — M54.2 CERVICALGIA: Primary | ICD-10-CM

## 2025-04-23 PROCEDURE — 97110 THERAPEUTIC EXERCISES: CPT

## 2025-04-23 PROCEDURE — 97112 NEUROMUSCULAR REEDUCATION: CPT

## 2025-04-23 PROCEDURE — 97010 HOT OR COLD PACKS THERAPY: CPT

## 2025-04-23 NOTE — PROGRESS NOTES
"Daily Note     Today's date: 2025  Patient name: Júnior Lee III  : 1955  MRN: [id number]  Referring provider: Sonido Giang PA-C  Dx:   Encounter Diagnosis     ICD-10-CM    1. Cervicalgia  M54.2                      Subjective: Pt reports he is about the same as he's been, no better and no worse. He thinks he is where he is going to be with his progress.       Objective: See treatment diary below      Assessment: Tolerated treatment well. No complaints of pain throughout session. Pt demonstrates good mechanics with exercises today. Added in t/s extension with verbal and visual cues on how to do this at home as it was recently added to HEP. Pt expressed understanding. He is going to go fishing after todays session. Patient would benefit from continued PT      Plan: Progress treatment as tolerated.       Precautions: HTN, CAD  HEP:[id number]       Manuals 25Re-Eval   C/s PROM and gentle traction Held-assess NV   Seated 8' upper trap c/s psp and suboccipitals Seated 10' upper trap c/s psp and suboccipitals                            Neuro Re-Ed         Upper trap stretch  10\"x10 jai  10x 10\" jai  10x10\" 10x10\" 10x10\"   Levator stretch  10x 10\" jai  10x 10\" jai  10x10\" 10x10\" 10x10\"   C/s Retractions 2\"x10 tucks and retractions 2\"x10 tucks and retractions 2\"x10 tucks and retractions 2\"x10 tucks and retractions 2\"x10 tucks and retractions   Doorway stretch  10\"x12 10\"x12 10\"x12 10\"x12  10\"x12    Scap squeeze         Thoracic ext   X15 over roll       Foam roll up wall  X15 2\" leaning in up top  X15 2\" leaning in up top  X15 2\" leaning in up top  X15 2\" leaning in up top     Ther Ex        UBE Lv4 10' alt  Lv4 10' Lv4 10' alt  Lv4 10' alt  Lv4 10' alt    MTP/LTP Blk and green doubled up 3x10 each  Blk and green doubled up 3x10 each  Blk and green doubled up 3x10 each  Blk and green doubled up 3x10 each  Blk and green doubled up 3x10 each    Supine horizontal abd       "   Shoulder rolls         Banded c/s retraction Red in chair x15 Red in chair 2x8 Add NV Add NV Red in chair x15   Webslide face pull Blk and green doubled up 3x10 each  Blk and green doubled up 3x10 each  Blk and green doubled up 3x10 each  Blk and green doubled up 3x10 each  Blk and green doubled up 3x10 each            Pt Edu Pt provided with updated HEP    Provided with green, blue, black band    Ther Activity                        Gait Training                        Modalities        MHP/CP MHP c-spine 10' MHP c-spine 10'

## 2025-04-24 DIAGNOSIS — I10 PRIMARY HYPERTENSION: ICD-10-CM

## 2025-04-24 RX ORDER — LANSOPRAZOLE 30 MG/1
30 CAPSULE, DELAYED RELEASE ORAL DAILY
Qty: 90 CAPSULE | Refills: 1 | Status: SHIPPED | OUTPATIENT
Start: 2025-04-24

## 2025-04-24 RX ORDER — METOPROLOL SUCCINATE 25 MG/1
25 TABLET, EXTENDED RELEASE ORAL DAILY
Qty: 90 TABLET | Refills: 1 | Status: SHIPPED | OUTPATIENT
Start: 2025-04-24

## 2025-04-30 ENCOUNTER — OFFICE VISIT (OUTPATIENT)
Dept: PHYSICAL THERAPY | Facility: CLINIC | Age: 70
End: 2025-04-30
Payer: COMMERCIAL

## 2025-04-30 DIAGNOSIS — M54.2 CERVICALGIA: Primary | ICD-10-CM

## 2025-04-30 PROCEDURE — 97112 NEUROMUSCULAR REEDUCATION: CPT

## 2025-04-30 PROCEDURE — 97110 THERAPEUTIC EXERCISES: CPT

## 2025-04-30 NOTE — PROGRESS NOTES
"Daily Note     Today's date: 2025  Patient name: Júnior Lee III  : 1955  MRN: [id number]  Referring provider: Sonido Giang PA-C  Dx:   Encounter Diagnosis     ICD-10-CM    1. Cervicalgia  M54.2                      Subjective: Pt reports that he is doing well today in regards to his neck. He has been doing a lot of fishing and he is continuing his exercise at home. He does not feel that he has regressed in his progress here with decreased tx frequency. He is still planning for today to be his last session.       Objective: See treatment diary below      Assessment: Tolerated treatment well. Pt tolerated all exercises well with no pain and demonstrated good mechanics. Discussed tubing bands at home with pt as he is interested in getting these vs the theraband strips. Pt expressed understanding of all exercises and he feels confident he can manage his program on his own. At this time, patient is discharged from PT.     Plan:At this time, patient is discharged from PT and transition to HEP with full independence.      Precautions: HTN, CAD  HEP:[id number]       Manuals 16--25Re-Eval   C/s PROM and gentle traction Held-assess NV    Seated 10' upper trap c/s psp and suboccipitals                            Neuro Re-Ed         Upper trap stretch  10\"x10 jai  10x 10\" jai  10x 10\" jai  10x10\" 10x10\"   Levator stretch  10x 10\" jai  10x 10\" jai  10x 10\" jai  10x10\" 10x10\"   C/s Retractions 2\"x10 tucks and retractions 2\"x10 tucks and retractions 2\"x10 tucks and retractions 2\"x10 tucks and retractions 2\"x10 tucks and retractions   Doorway stretch  10\"x12 10\"x12 10\"x12 10\"x12  10\"x12    Scap squeeze         Thoracic ext   X15 over roll  X10 5\"     Foam roll up wall  X15 2\" leaning in up top  X15 2\" leaning in up top  X15 2\" leaning in up top  X15 2\" leaning in up top     Ther Ex        UBE Lv4 10' alt  Lv4 10' Lv4 10' Lv4 10' alt  Lv4 10' alt    MTP/LTP Blk and green doubled up " 3x10 each  Blk and green doubled up 3x10 each  Blk and green doubled up 3x10 each  Blk and green doubled up 3x10 each  Blk and green doubled up 3x10 each    Supine horizontal abd         Shoulder rolls         Banded c/s retraction Red in chair x15 Red in chair 2x8 Red in chair 2x8 Add NV Red in chair x15   Webslide face pull Blk and green doubled up 3x10 each  Blk and green doubled up 3x10 each  Blk and green doubled up 3x10 each  Blk and green doubled up 3x10 each  Blk and green doubled up 3x10 each            Pt Edu Pt provided with updated HEP    Provided with green, blue, black band    Ther Activity                        Gait Training                        Modalities        MHP/CP MHP c-spine 10' MHP c-spine 10'

## 2025-05-06 DIAGNOSIS — E78.2 MIXED HYPERLIPIDEMIA: ICD-10-CM

## 2025-05-06 RX ORDER — ATORVASTATIN CALCIUM 40 MG/1
40 TABLET, FILM COATED ORAL DAILY
Qty: 90 TABLET | Refills: 0 | Status: SHIPPED | OUTPATIENT
Start: 2025-05-06

## 2025-05-14 ENCOUNTER — RA CDI HCC (OUTPATIENT)
Dept: OTHER | Facility: HOSPITAL | Age: 70
End: 2025-05-14

## 2025-06-26 ENCOUNTER — OFFICE VISIT (OUTPATIENT)
Dept: FAMILY MEDICINE CLINIC | Facility: CLINIC | Age: 70
End: 2025-06-26
Payer: COMMERCIAL

## 2025-06-26 VITALS
HEIGHT: 68 IN | BODY MASS INDEX: 25.16 KG/M2 | TEMPERATURE: 97 F | SYSTOLIC BLOOD PRESSURE: 120 MMHG | HEART RATE: 79 BPM | WEIGHT: 166 LBS | OXYGEN SATURATION: 98 % | DIASTOLIC BLOOD PRESSURE: 68 MMHG

## 2025-06-26 DIAGNOSIS — Z00.00 MEDICARE ANNUAL WELLNESS VISIT, SUBSEQUENT: ICD-10-CM

## 2025-06-26 DIAGNOSIS — I10 PRIMARY HYPERTENSION: Primary | ICD-10-CM

## 2025-06-26 DIAGNOSIS — Z12.5 SCREENING FOR PROSTATE CANCER: ICD-10-CM

## 2025-06-26 DIAGNOSIS — Z87.891 PERSONAL HISTORY OF NICOTINE DEPENDENCE: ICD-10-CM

## 2025-06-26 DIAGNOSIS — E78.2 MIXED HYPERLIPIDEMIA: ICD-10-CM

## 2025-06-26 DIAGNOSIS — I25.10 CORONARY ARTERY DISEASE INVOLVING NATIVE CORONARY ARTERY OF NATIVE HEART WITHOUT ANGINA PECTORIS: ICD-10-CM

## 2025-06-26 PROCEDURE — G0439 PPPS, SUBSEQ VISIT: HCPCS | Performed by: PHYSICIAN ASSISTANT

## 2025-06-26 PROCEDURE — 99214 OFFICE O/P EST MOD 30 MIN: CPT | Performed by: PHYSICIAN ASSISTANT

## 2025-06-26 PROCEDURE — G2211 COMPLEX E/M VISIT ADD ON: HCPCS | Performed by: PHYSICIAN ASSISTANT

## 2025-06-26 NOTE — ASSESSMENT & PLAN NOTE
No cardiac complaints  Continue current medical therapy  Orders:  •  Comprehensive metabolic panel; Future  •  Lipid Panel with Direct LDL reflex; Future  •  CBC and differential; Future

## 2025-06-26 NOTE — ASSESSMENT & PLAN NOTE
At goal on current regimen, no changes  Labs ordered  Orders:  •  Comprehensive metabolic panel; Future  •  CBC and differential; Future

## 2025-06-26 NOTE — PROGRESS NOTES
Name: Júnior Lee III      : 1955      MRN: [id number]  Encounter Provider: Sonido Giang PA-C  Encounter Date: 2025   Encounter department: [facility]  :  Assessment & Plan  Primary hypertension  At goal on current regimen, no changes  Labs ordered  Orders:  •  Comprehensive metabolic panel; Future  •  CBC and differential; Future    CAD, LAD stenting, 2022  No cardiac complaints  Continue current medical therapy  Orders:  •  Comprehensive metabolic panel; Future  •  Lipid Panel with Direct LDL reflex; Future  •  CBC and differential; Future    Mixed hyperlipidemia  Check FLP and continue statin  Orders:  •  Lipid Panel with Direct LDL reflex; Future    Medicare annual wellness visit, subsequent         Personal history of nicotine dependence    Orders:  •  CT lung screening program; Future    Screening for prostate cancer    Orders:  •  PSA, Total Screen; Future       Preventive health issues were discussed with patient, and age appropriate screening tests were ordered as noted in patient's After Visit Summary. Personalized health advice and appropriate referrals for health education or preventive services given if needed, as noted in patient's After Visit Summary.    History of Present Illness     Pt presents for follow up    CAD s/p PCI to LAD. No cardiac complaints. Follows with cardiology. On asa, staitn, imdur, toprol prn nitro  HTN: well controlled 120/68  HLD: on statin, due for FLP  Hx of hep c, remains undetectable  Continues to vape, previous cigarette smoker, due for CT lung screening         Patient Care Team:  Sonido Giang PA-C as PCP - General (Family Medicine)  Keith Romano DO (General Surgery)    Review of Systems   Constitutional:  Negative for chills and fever.   HENT:  Negative for ear pain and sore throat.    Eyes:  Negative for pain and visual disturbance.   Respiratory:  Negative for cough and shortness of breath.    Cardiovascular:   Negative for chest pain and palpitations.   Gastrointestinal:  Negative for abdominal pain and vomiting.   Genitourinary:  Negative for dysuria and hematuria.   Musculoskeletal:  Negative for arthralgias and back pain.   Skin:  Negative for color change and rash.   Neurological:  Negative for seizures and syncope.   All other systems reviewed and are negative.    Medical History Reviewed by provider this encounter:  Tobacco  Allergies  Meds  Problems  Med Hx  Surg Hx  Fam Hx       Annual Wellness Visit Questionnaire   Júnior is here for his Subsequent Wellness visit.     Health Risk Assessment:   Patient rates overall health as good. Patient feels that their physical health rating is same. Patient is satisfied with their life. Eyesight was rated as same. Hearing was rated as same. Patient feels that their emotional and mental health rating is same. Patients states they are never, rarely angry. Patient states they are never, rarely unusually tired/fatigued. Pain experienced in the last 7 days has been some. Patient's pain rating has been 4/10. Patient states that he has experienced no weight loss or gain in last 6 months.     Depression Screening:   PHQ-2 Score: 0      Fall Risk Screening:   In the past year, patient has experienced: no history of falling in past year      Home Safety:  Patient does not have trouble with stairs inside or outside of their home. Patient has working smoke alarms and has no working carbon monoxide detector. Home safety hazards include: none.     Nutrition:   Current diet is Regular, Low Saturated Fat, No Added Salt and Limited junk food.     Medications:   Patient is not currently taking any over-the-counter supplements. Patient is able to manage medications.     Activities of Daily Living (ADLs)/Instrumental Activities of Daily Living (IADLs):   Walk and transfer into and out of bed and chair?: Yes  Dress and groom yourself?: Yes    Bathe or shower yourself?: Yes    Feed  yourself? Yes  Do your laundry/housekeeping?: Yes  Manage your money, pay your bills and track your expenses?: Yes  Make your own meals?: Yes    Do your own shopping?: Yes    Previous Hospitalizations:   Any hospitalizations or ED visits within the last 12 months?: No      Advance Care Planning:   Living will: No    Durable POA for healthcare: No    Advanced directive: No      Preventive Screenings      Cardiovascular Screening:    General: Screening Not Indicated and History Lipid Disorder      Diabetes Screening:     General: Screening Current      Colorectal Cancer Screening:     General: Screening Current      Abdominal Aortic Aneurysm (AAA) Screening:    Risk factors include: age between 65-76 yo and tobacco use        Lung Cancer Screening:     General: Screening Not Indicated      Hepatitis C Screening:    General: Screening Not Indicated and History Hepatitis C    Immunizations:  - Immunizations due: Prevnar 20 and Zoster (Shingrix)    Screening, Brief Intervention, and Referral to Treatment (SBIRT)     Screening  Typical number of drinks in a day: 0  Typical number of drinks in a week: 0  Interpretation: Low risk drinking behavior.    AUDIT-C Screenin) How often did you have a drink containing alcohol in the past year? never  2) How many drinks did you have on a typical day when you were drinking in the past year? 0  3) How often did you have 6 or more drinks on one occasion in the past year? never    AUDIT-C Score: 0  Interpretation: Score 0-3 (male): Negative screen for alcohol misuse    Single Item Drug Screening:  How often have you used an illegal drug (including marijuana) or a prescription medication for non-medical reasons in the past year? never    Single Item Drug Screen Score: 0  Interpretation: Negative screen for possible drug use disorder    Social Drivers of Health     Financial Resource Strain: Medium Risk (2023)    Overall Financial Resource Strain (CARDIA)    • Difficulty of  "Paying Living Expenses: Somewhat hard   Food Insecurity: No Food Insecurity (6/24/2025)    Nursing - Inadequate Food Risk Classification    • Worried About Running Out of Food in the Last Year: Never true    • Ran Out of Food in the Last Year: Never true   Transportation Needs: No Transportation Needs (6/24/2025)    PRAPARE - Transportation    • Lack of Transportation (Medical): No    • Lack of Transportation (Non-Medical): No   Housing Stability: Low Risk  (6/24/2025)    Housing Stability Vital Sign    • Unable to Pay for Housing in the Last Year: No    • Number of Times Moved in the Last Year: 0    • Homeless in the Last Year: No   Utilities: Not At Risk (6/24/2025)    [facility] Utilities    • Threatened with loss of utilities: No     No results found.    Objective   /68   Pulse 79   Temp (!) 97 °F (36.1 °C)   Ht 5' 8\" (1.727 m)   Wt 75.3 kg (166 lb)   SpO2 98%   BMI 25.24 kg/m²     Physical Exam  Vitals and nursing note reviewed.   Constitutional:       General: He is not in acute distress.     Appearance: He is well-developed.   HENT:      Head: Normocephalic and atraumatic.     Eyes:      Conjunctiva/sclera: Conjunctivae normal.       Cardiovascular:      Rate and Rhythm: Normal rate and regular rhythm.      Heart sounds: No murmur heard.  Pulmonary:      Effort: Pulmonary effort is normal. No respiratory distress.      Breath sounds: Normal breath sounds. No wheezing, rhonchi or rales.   Abdominal:      Palpations: Abdomen is soft.      Tenderness: There is no abdominal tenderness.     Musculoskeletal:         General: No swelling.      Cervical back: Neck supple.     Skin:     General: Skin is warm and dry.      Capillary Refill: Capillary refill takes less than 2 seconds.     Neurological:      Mental Status: He is alert.     Psychiatric:         Mood and Affect: Mood normal.         "

## 2025-07-21 DIAGNOSIS — E78.2 MIXED HYPERLIPIDEMIA: ICD-10-CM

## 2025-07-21 RX ORDER — ATORVASTATIN CALCIUM 40 MG/1
40 TABLET, FILM COATED ORAL DAILY
Qty: 90 TABLET | Refills: 0 | Status: SHIPPED | OUTPATIENT
Start: 2025-07-21

## 2025-07-29 DIAGNOSIS — E78.2 MIXED HYPERLIPIDEMIA: ICD-10-CM

## 2025-07-29 DIAGNOSIS — I10 PRIMARY HYPERTENSION: ICD-10-CM

## 2025-07-29 DIAGNOSIS — R07.2 PRECORDIAL CHEST PAIN: ICD-10-CM

## 2025-07-29 DIAGNOSIS — K21.9 GASTROESOPHAGEAL REFLUX DISEASE WITHOUT ESOPHAGITIS: ICD-10-CM

## 2025-07-29 RX ORDER — LANSOPRAZOLE 30 MG/1
30 CAPSULE, DELAYED RELEASE ORAL DAILY
Qty: 90 CAPSULE | Refills: 1 | Status: SHIPPED | OUTPATIENT
Start: 2025-07-29

## 2025-07-29 RX ORDER — ATORVASTATIN CALCIUM 40 MG/1
40 TABLET, FILM COATED ORAL DAILY
Qty: 90 TABLET | Refills: 0 | OUTPATIENT
Start: 2025-07-29

## 2025-07-29 RX ORDER — METOPROLOL SUCCINATE 25 MG/1
25 TABLET, EXTENDED RELEASE ORAL DAILY
Qty: 90 TABLET | Refills: 1 | Status: SHIPPED | OUTPATIENT
Start: 2025-07-29

## 2025-07-30 RX ORDER — ISOSORBIDE MONONITRATE 30 MG/1
30 TABLET, EXTENDED RELEASE ORAL DAILY
Qty: 90 TABLET | Refills: 1 | Status: SHIPPED | OUTPATIENT
Start: 2025-07-30

## (undated) DEVICE — CATH IVUS EAGLE EYE PLATINUM 5FR 150CM

## (undated) DEVICE — NC TREK CORONARY DILATATION CATHETER 3.0 MM X 15 MM / RAPID-EXCHANGE: Brand: NC TREK

## (undated) DEVICE — CATH DIAG 5FR IMPULSE 100CM FL3.5

## (undated) DEVICE — RUNTHROUGH NS EXTRA FLOPPY PTCA GUIDEWIRE: Brand: RUNTHROUGH

## (undated) DEVICE — CATH DIAG 5FR IMPULSE 100CM FR4

## (undated) DEVICE — DGW .035 FC J3MM 260CM TEF: Brand: EMERALD

## (undated) DEVICE — NEEDLE PERCUTANEOUS 21G X 4CM

## (undated) DEVICE — TR BAND RADIAL ARTERY COMPRESSION DEVICE: Brand: TR BAND

## (undated) DEVICE — TREK CORONARY DILATATION CATHETER 2.50 MM X 20 MM/ OVER-THE-WIRE: Brand: TREK

## (undated) DEVICE — GLIDESHEATH SLENDER STAINLESS STEEL KIT: Brand: GLIDESHEATH SLENDER

## (undated) DEVICE — CATH GUIDE LAUNCHER 6FR EBU 3.5